# Patient Record
Sex: FEMALE | Race: BLACK OR AFRICAN AMERICAN | Employment: OTHER | URBAN - METROPOLITAN AREA
[De-identification: names, ages, dates, MRNs, and addresses within clinical notes are randomized per-mention and may not be internally consistent; named-entity substitution may affect disease eponyms.]

---

## 2013-01-01 LAB — COLONOSCOPY, EXTERNAL: NORMAL

## 2014-01-01 LAB — PAP SMEAR, EXTERNAL: NORMAL

## 2019-03-29 LAB — MAMMOGRAPHY, EXTERNAL: NORMAL

## 2019-05-01 ENCOUNTER — ED HISTORICAL/CONVERTED ENCOUNTER (OUTPATIENT)
Dept: OTHER | Age: 69
End: 2019-05-01

## 2019-11-08 LAB — HBA1C MFR BLD HPLC: 7.6 %

## 2020-02-10 LAB — HBA1C MFR BLD HPLC: 8 %

## 2020-06-20 VITALS
OXYGEN SATURATION: 96 % | SYSTOLIC BLOOD PRESSURE: 178 MMHG | TEMPERATURE: 98.1 F | BODY MASS INDEX: 39.9 KG/M2 | WEIGHT: 239.5 LBS | HEART RATE: 62 BPM | HEIGHT: 65 IN | DIASTOLIC BLOOD PRESSURE: 87 MMHG | RESPIRATION RATE: 18 BRPM

## 2020-06-20 PROBLEM — I10 ESSENTIAL (PRIMARY) HYPERTENSION: Status: ACTIVE | Noted: 2017-09-26

## 2020-06-20 PROBLEM — M10.9 GOUT: Status: ACTIVE | Noted: 2019-08-09

## 2020-06-20 PROBLEM — J45.20 MILD INTERMITTENT ASTHMA, UNCOMPLICATED: Status: ACTIVE | Noted: 2020-02-10

## 2020-06-20 PROBLEM — E11.9 TYPE 2 DIABETES MELLITUS WITHOUT COMPLICATIONS (HCC): Status: ACTIVE | Noted: 2017-09-26

## 2020-06-20 PROBLEM — E11.69 MIXED HYPERLIPIDEMIA DUE TO TYPE 2 DIABETES MELLITUS (HCC): Status: ACTIVE | Noted: 2017-09-26

## 2020-06-20 PROBLEM — E78.2 MIXED HYPERLIPIDEMIA DUE TO TYPE 2 DIABETES MELLITUS (HCC): Status: ACTIVE | Noted: 2017-09-26

## 2020-06-20 PROBLEM — H40.9 GLAUCOMA: Status: ACTIVE | Noted: 2018-11-30

## 2020-06-20 RX ORDER — COLCHICINE 0.6 MG/1
0.6 TABLET ORAL 2 TIMES DAILY
COMMUNITY
End: 2021-03-05 | Stop reason: ALTCHOICE

## 2020-06-20 RX ORDER — CLONIDINE HYDROCHLORIDE 0.2 MG/1
0.2 TABLET ORAL 2 TIMES DAILY
COMMUNITY
End: 2020-09-02

## 2020-06-20 RX ORDER — GLIPIZIDE 5 MG/1
TABLET ORAL
COMMUNITY
End: 2020-08-10 | Stop reason: SDUPTHER

## 2020-06-20 RX ORDER — BENZONATATE 200 MG/1
200 CAPSULE ORAL
COMMUNITY
End: 2020-08-10 | Stop reason: ALTCHOICE

## 2020-06-20 RX ORDER — ALBUTEROL SULFATE 90 UG/1
2 AEROSOL, METERED RESPIRATORY (INHALATION)
COMMUNITY
End: 2020-08-10 | Stop reason: ALTCHOICE

## 2020-06-20 RX ORDER — OFLOXACIN 3 MG/ML
SOLUTION/ DROPS OPHTHALMIC
COMMUNITY
End: 2020-08-10 | Stop reason: ALTCHOICE

## 2020-06-20 RX ORDER — ALBUTEROL SULFATE 0.83 MG/ML
SOLUTION RESPIRATORY (INHALATION)
COMMUNITY
End: 2020-08-10 | Stop reason: ALTCHOICE

## 2020-06-20 RX ORDER — LATANOPROST 50 UG/ML
1 SOLUTION/ DROPS OPHTHALMIC
COMMUNITY
End: 2020-11-17 | Stop reason: ALTCHOICE

## 2020-06-20 RX ORDER — DORZOLAMIDE HCL 20 MG/ML
1 SOLUTION/ DROPS OPHTHALMIC 2 TIMES DAILY
COMMUNITY
End: 2020-11-17 | Stop reason: ALTCHOICE

## 2020-06-20 RX ORDER — ALLOPURINOL 100 MG/1
100 TABLET ORAL DAILY
COMMUNITY
End: 2020-08-13

## 2020-06-20 RX ORDER — HYDROCHLOROTHIAZIDE 25 MG/1
25 TABLET ORAL DAILY
COMMUNITY
End: 2020-09-02

## 2020-06-20 RX ORDER — METFORMIN HYDROCHLORIDE 1000 MG/1
TABLET ORAL
COMMUNITY
End: 2020-09-02

## 2020-06-20 RX ORDER — ISOPROPYL ALCOHOL 70 ML/100ML
SWAB TOPICAL
COMMUNITY
End: 2020-08-10 | Stop reason: ALTCHOICE

## 2020-06-20 RX ORDER — AMLODIPINE BESYLATE 10 MG/1
10 TABLET ORAL DAILY
COMMUNITY
End: 2020-08-02

## 2020-06-20 RX ORDER — CALCIUM CITRATE/VITAMIN D3 200MG-6.25
TABLET ORAL
COMMUNITY
End: 2021-01-04 | Stop reason: SDUPTHER

## 2020-06-20 RX ORDER — PREDNISOLONE ACETATE 10 MG/ML
1 SUSPENSION/ DROPS OPHTHALMIC
COMMUNITY
End: 2020-08-10 | Stop reason: ALTCHOICE

## 2020-06-20 RX ORDER — ATENOLOL 50 MG/1
50 TABLET ORAL DAILY
COMMUNITY
End: 2020-08-10 | Stop reason: SDUPTHER

## 2020-06-20 RX ORDER — PRAVASTATIN SODIUM 40 MG/1
40 TABLET ORAL DAILY
COMMUNITY
End: 2020-09-02

## 2020-06-20 RX ORDER — BLOOD-GLUCOSE METER
EACH MISCELLANEOUS
COMMUNITY
End: 2020-11-17 | Stop reason: ALTCHOICE

## 2020-06-20 RX ORDER — BRIMONIDINE TARTRATE 2 MG/ML
SOLUTION/ DROPS OPHTHALMIC
COMMUNITY
End: 2020-11-17 | Stop reason: ALTCHOICE

## 2020-06-20 RX ORDER — LISINOPRIL 20 MG/1
20 TABLET ORAL DAILY
COMMUNITY
End: 2020-09-02

## 2020-06-20 RX ORDER — GLUCOSAM/CHON-MSM1/C/MANG/BOSW 500-416.6
TABLET ORAL
COMMUNITY
End: 2022-05-13 | Stop reason: ALTCHOICE

## 2020-06-20 RX ORDER — TIMOLOL MALEATE 5 MG/ML
SOLUTION/ DROPS OPHTHALMIC
COMMUNITY
End: 2020-11-17 | Stop reason: ALTCHOICE

## 2020-08-02 RX ORDER — AMLODIPINE BESYLATE 10 MG/1
TABLET ORAL
Qty: 90 TAB | Refills: 0 | Status: SHIPPED | OUTPATIENT
Start: 2020-08-02 | End: 2020-11-20

## 2020-08-10 ENCOUNTER — OFFICE VISIT (OUTPATIENT)
Dept: PRIMARY CARE CLINIC | Age: 70
End: 2020-08-10
Payer: MEDICARE

## 2020-08-10 VITALS
RESPIRATION RATE: 16 BRPM | HEIGHT: 65 IN | TEMPERATURE: 96.3 F | WEIGHT: 236.5 LBS | BODY MASS INDEX: 39.4 KG/M2 | SYSTOLIC BLOOD PRESSURE: 133 MMHG | DIASTOLIC BLOOD PRESSURE: 65 MMHG | OXYGEN SATURATION: 96 % | HEART RATE: 70 BPM

## 2020-08-10 DIAGNOSIS — E11.9 TYPE 2 DIABETES MELLITUS WITHOUT COMPLICATION, WITHOUT LONG-TERM CURRENT USE OF INSULIN (HCC): ICD-10-CM

## 2020-08-10 DIAGNOSIS — I10 ESSENTIAL (PRIMARY) HYPERTENSION: ICD-10-CM

## 2020-08-10 DIAGNOSIS — E66.01 SEVERE OBESITY (HCC): ICD-10-CM

## 2020-08-10 DIAGNOSIS — E11.9 CONTROLLED TYPE 2 DIABETES MELLITUS WITHOUT COMPLICATION, WITHOUT LONG-TERM CURRENT USE OF INSULIN (HCC): ICD-10-CM

## 2020-08-10 DIAGNOSIS — E11.69 MIXED HYPERLIPIDEMIA DUE TO TYPE 2 DIABETES MELLITUS (HCC): ICD-10-CM

## 2020-08-10 DIAGNOSIS — E78.2 MIXED HYPERLIPIDEMIA DUE TO TYPE 2 DIABETES MELLITUS (HCC): ICD-10-CM

## 2020-08-10 DIAGNOSIS — M15.9 OSTEOARTHRITIS INVOLVING MULTIPLE JOINTS ON BOTH SIDES OF BODY: Primary | ICD-10-CM

## 2020-08-10 DIAGNOSIS — B35.4 TINEA CORPORIS: ICD-10-CM

## 2020-08-10 DIAGNOSIS — M1A.9XX0 CHRONIC GOUT WITHOUT TOPHUS, UNSPECIFIED CAUSE, UNSPECIFIED SITE: ICD-10-CM

## 2020-08-10 DIAGNOSIS — Z12.39 SCREENING FOR BREAST CANCER: ICD-10-CM

## 2020-08-10 PROBLEM — M17.12 PRIMARY OSTEOARTHRITIS OF LEFT KNEE: Status: ACTIVE | Noted: 2017-08-08

## 2020-08-10 PROBLEM — Z96.659 HISTORY OF TOTAL KNEE REPLACEMENT: Status: ACTIVE | Noted: 2017-06-30

## 2020-08-10 PROCEDURE — G8417 CALC BMI ABV UP PARAM F/U: HCPCS | Performed by: FAMILY MEDICINE

## 2020-08-10 PROCEDURE — 3017F COLORECTAL CA SCREEN DOC REV: CPT | Performed by: FAMILY MEDICINE

## 2020-08-10 PROCEDURE — G8427 DOCREV CUR MEDS BY ELIG CLIN: HCPCS | Performed by: FAMILY MEDICINE

## 2020-08-10 PROCEDURE — G9899 SCRN MAM PERF RSLTS DOC: HCPCS | Performed by: FAMILY MEDICINE

## 2020-08-10 PROCEDURE — G8752 SYS BP LESS 140: HCPCS | Performed by: FAMILY MEDICINE

## 2020-08-10 PROCEDURE — G8754 DIAS BP LESS 90: HCPCS | Performed by: FAMILY MEDICINE

## 2020-08-10 PROCEDURE — G8536 NO DOC ELDER MAL SCRN: HCPCS | Performed by: FAMILY MEDICINE

## 2020-08-10 PROCEDURE — G8510 SCR DEP NEG, NO PLAN REQD: HCPCS | Performed by: FAMILY MEDICINE

## 2020-08-10 PROCEDURE — 99214 OFFICE O/P EST MOD 30 MIN: CPT | Performed by: FAMILY MEDICINE

## 2020-08-10 PROCEDURE — 3052F HG A1C>EQUAL 8.0%<EQUAL 9.0%: CPT | Performed by: FAMILY MEDICINE

## 2020-08-10 PROCEDURE — G8400 PT W/DXA NO RESULTS DOC: HCPCS | Performed by: FAMILY MEDICINE

## 2020-08-10 RX ORDER — ATENOLOL 50 MG/1
50 TABLET ORAL DAILY
Qty: 90 TAB | Refills: 1 | Status: SHIPPED | OUTPATIENT
Start: 2020-08-10 | End: 2021-02-13

## 2020-08-10 RX ORDER — GLIPIZIDE 5 MG/1
10 TABLET ORAL 2 TIMES DAILY
Qty: 360 TAB | Refills: 1 | Status: SHIPPED | OUTPATIENT
Start: 2020-08-10 | End: 2021-02-06

## 2020-08-10 RX ORDER — PRENATAL VIT 91/IRON/FOLIC/DHA 28-975-200
COMBINATION PACKAGE (EA) ORAL 2 TIMES DAILY
Qty: 30 G | Refills: 0 | Status: SHIPPED | OUTPATIENT
Start: 2020-08-10 | End: 2020-11-17 | Stop reason: ALTCHOICE

## 2020-08-10 RX ORDER — DICLOFENAC SODIUM 10 MG/G
2 GEL TOPICAL 4 TIMES DAILY
Qty: 100 G | Refills: 1 | Status: SHIPPED | OUTPATIENT
Start: 2020-08-10 | End: 2021-03-05 | Stop reason: ALTCHOICE

## 2020-08-10 RX ORDER — TRIAMCINOLONE ACETONIDE 5 MG/G
CREAM TOPICAL
Qty: 30 G | Refills: 1 | Status: SHIPPED | OUTPATIENT
Start: 2020-08-10 | End: 2020-11-17 | Stop reason: ALTCHOICE

## 2020-08-10 NOTE — PROGRESS NOTES
Blaze Thorne is a 65 y.o. female who presents today for the following:  Chief Complaint   Patient presents with    Hypertension    Follow Up Chronic Condition    Diabetes    Labs        This patient comes in today for follow up of the following medical conditions: Diabetes Mellitus, Hypertension and Hyperlipidemia She is doing well. Allergies   Allergen Reactions    Aspirin Shortness of Breath     Respiratory distress    Codeine Vertigo       Current Outpatient Medications   Medication Sig    glipiZIDE (GLUCOTROL) 5 mg tablet Take 2 Tabs by mouth two (2) times a day for 180 days. Take 2 TABS by mouth twice daily.  atenoloL (TENORMIN) 50 mg tablet Take 1 Tab by mouth daily. Indications: high blood pressure    terbinafine HCL (LAMISIL) 1 % topical cream Apply  to affected area two (2) times a day.  triamcinolone (ARISTOCORT) 0.5 % topical cream Apply thin amount to skin as needed for itching    diclofenac (VOLTAREN) 1 % gel Apply 2 g to affected area four (4) times daily. As needed for arthritis pain    amLODIPine (NORVASC) 10 mg tablet TAKE 1 TABLET EVERY DAY    allopurinoL (ZYLOPRIM) 100 mg tablet Take 100 mg by mouth daily.  brimonidine (ALPHAGAN) 0.2 % ophthalmic solution Unknown directions    cloNIDine HCL (CATAPRES) 0.2 mg tablet Take 0.2 mg by mouth two (2) times a day.  dorzolamide (TRUSOPT) 2 % ophthalmic solution Administer 1 Drop to both eyes two (2) times a day.  hydroCHLOROthiazide (HYDRODIURIL) 25 mg tablet Take 25 mg by mouth daily.  latanoprost (XALATAN) 0.005 % ophthalmic solution Administer 1 Drop to both eyes nightly.  lisinopriL (PRINIVIL, ZESTRIL) 20 mg tablet Take 20 mg by mouth daily.  metFORMIN (GLUCOPHAGE) 1,000 mg tablet Take 1/2 tablet by mouth twice daily.  pravastatin (PRAVACHOL) 40 mg tablet Take 40 mg by mouth daily.     timolol (TIMOPTIC) 0.5 % ophthalmic solution Unknown directions    True Metrix Air Glucose Meter monitoring kit USE TO CHECK BLOOD SUGAR DAILY    True Metrix Glucose Test Strip strip USE TO CHECK BLOOD SUGAR DAILY    TRUEplus Lancets 28 gauge misc USE TO CHECK BLOOD SUGAR DAILY    colchicine (Colcrys) 0.6 mg tablet Take 0.6 mg by mouth two (2) times a day. No current facility-administered medications for this visit. Past Medical History:   Diagnosis Date    Essential (primary) hypertension 9/26/2017    Glaucoma 11/30/2018    Gout 8/9/2019    Mild intermittent asthma, uncomplicated 8/85/1570    Mixed hyperlipidemia due to type 2 diabetes mellitus ([facility] [address] 9/26/2017    Type 2 diabetes mellitus without complications ([facility] [address] 4/52/2507       Past Surgical History:   Procedure Laterality Date    HX HERNIA REPAIR  Unknown date    HX KNEE ARTHROSCOPY Right 01/01/2013    HX OTHER SURGICAL  05/16/2017    Eye surgery procedure    HX PARTIAL HYSTERECTOMY  Unknown date       Family History   Problem Relation Age of Onset    Arthritis-osteo Mother        Social History     Socioeconomic History    Marital status:      Spouse name: Not on file    Number of children: Not on file    Years of education: Not on file    Highest education level: Not on file   Occupational History    Not on file   Social Needs    Financial resource strain: Not on file    Food insecurity     Worry: Not on file     Inability: Not on file    Transportation needs     Medical: Not on file     Non-medical: Not on file   Tobacco Use    Smoking status: Former Smoker     Types: Cigarettes    Smokeless tobacco: Never Used    Tobacco comment: At least 30 years ago.    Substance and Sexual Activity    Alcohol use: Never     Frequency: Never    Drug use: Never    Sexual activity: Not on file   Lifestyle    Physical activity     Days per week: Not on file     Minutes per session: Not on file    Stress: Not on file   Relationships    Social connections     Talks on phone: Not on file     Gets together: Not on file     Attends [employer] service: Not on file     Active member of club or organization: Not on file     Attends meetings of clubs or organizations: Not on file     Relationship status: Not on file    Intimate partner violence     Fear of current or ex partner: Not on file     Emotionally abused: Not on file     Physically abused: Not on file     Forced sexual activity: Not on file   Other Topics Concern     Service Not Asked    Blood Transfusions Not Asked    Caffeine Concern Not Asked    Occupational Exposure Not Asked   [place] Hazards Not Asked    Sleep Concern Not Asked    Stress Concern Not Asked    Weight Concern Not Asked    Special Diet Not Asked    Back Care Not Asked    Exercise Not Asked    Bike Helmet Not Asked   [address] Not Asked    Self-Exams Not Asked   Social History Narrative    Not on file         Review of Systems   Constitutional: Negative. Respiratory: Negative. Cardiovascular: Negative. Gastrointestinal: Negative. Genitourinary: Negative. Musculoskeletal: Negative. Skin: Positive for itching (Circular lesion on the top of the right foot that itches for 2 weeks. ). Neurological: Negative. Endo/Heme/Allergies:        She does check her sugars daily and run around 150   Psychiatric/Behavioral: Negative. All other systems reviewed and are negative. Visit Vitals  /65 (BP 1 Location: Left arm, BP Patient Position: Sitting)   Pulse 70   Temp (!) 96.3 °F (35.7 °C) (Oral)   Resp 16   Ht 5' 5\" (1.651 m)   Wt 236 lb 8 oz (107.3 kg)   SpO2 96%   BMI 39.36 kg/m²     Physical Exam  Vitals signs and nursing note reviewed. Constitutional:       Appearance: Normal appearance. She is obese. Neck:      Musculoskeletal: Normal range of motion and neck supple. Cardiovascular:      Rate and Rhythm: Normal rate and regular rhythm. Pulses: Normal pulses. Heart sounds: Normal heart sounds.    Pulmonary:      Effort: Pulmonary effort is normal.      Breath sounds: Normal breath sounds. Abdominal:      General: Abdomen is flat. Bowel sounds are normal.      Palpations: Abdomen is soft. Musculoskeletal: Normal range of motion. Skin:     General: Skin is warm and dry. Findings: Lesion (2 cm circular papular lesion with raised borders. ) present. Neurological:      General: No focal deficit present. Mental Status: She is alert. Psychiatric:         Mood and Affect: Mood normal.         Behavior: Behavior normal.         Thought Content: Thought content normal.         Judgment: Judgment normal.              1. Controlled type 2 diabetes mellitus without complication, without long-term current use of insulin ([address]  She is doing well and we will check labs to see how things are.  - glipiZIDE (GLUCOTROL) 5 mg tablet; Take 2 Tabs by mouth two (2) times a day for 180 days. Take 2 TABS by mouth twice daily. Dispense: 360 Tab; Refill: 1  - CBC WITH AUTOMATED DIFF  - METABOLIC PANEL, COMPREHENSIVE  - LIPID PANEL  - URINALYSIS W/ RFLX MICROSCOPIC  - HEMOGLOBIN A1C WITH EAG  - MICROALBUMIN, UR, RAND W/ MICROALB/CREAT RATIO    2. Severe obesity ([address]  Discussed the need to work on weight loss and cut back on portion sizes    3. Essential (primary) hypertension  Blood pressure stable today  - atenoloL (TENORMIN) 50 mg tablet; Take 1 Tab by mouth daily. Indications: high blood pressure  Dispense: 90 Tab; Refill: 1  - CBC WITH AUTOMATED DIFF  - METABOLIC PANEL, COMPREHENSIVE  - URINALYSIS W/ RFLX MICROSCOPIC    4. Mixed hyperlipidemia due to type 2 diabetes mellitus (HCC)  - LIPID PANEL    5. Type 2 diabetes mellitus without complication, without long-term current use of insulin (HCC)  We will check labs to see how she is doing    6. Chronic gout without tophus, unspecified cause, unspecified site    - URIC ACID    7. Screening for breast cancer    - [facility] MAMMO BI SCREENING INCL CAD;  Future

## 2020-08-10 NOTE — PATIENT INSTRUCTIONS
Noninsulin Medicines for Type 2 Diabetes: Care Instructions Overview There are different types of noninsulin medicines for diabetes. Each works in a different way. But they all help you control your blood sugar. Some types help your body make insulin to lower your blood sugar. Others lower how much insulin your body needs. Some can slow how fast your body digests sugars. And some can remove extra glucose through your urine. You may need to take more than one medicine for diabetes. Two or more medicines may work better to lower your blood sugar level than just one does. · Metformin. This lowers how much glucose your liver makes. And it helps you respond better to insulin. It also lowers the amount of stored sugar that your liver releases when you are not eating. · Sulfonylureas. These help your body release more insulin. Some work for many hours. They can cause low blood sugar if you don't eat as you planned. An example is glipizide. · Thiazolidinediones. These reduce the amount of blood glucose. They also help you respond better to insulin. An example is pioglitazone. · SGLT2 inhibitors. These help to remove extra glucose through your urine. They may also help some people lose weight. An example is ertugliflozin. · DPP-4 inhibitors. These help your body raise the level of insulin after you eat. They also help your body make less of a hormone that raises blood sugar. An example is alogliptin. · Incretin hormones (GLP-1 receptor agonists). These help your body make a protein that can raise your insulin level and make you less hungry. They're given as shots or pills. An example is semaglutide. · Meglitinides. These help your body release insulin. They also help slow how your body digests sugars. So they can keep your blood sugar from rising too fast after you eat. · Alpha-glucosidase inhibitors. These keep starches from breaking down. This means that they lower the amount of glucose absorbed when you eat. They don't help your body make more insulin. So they will not cause low blood sugar unless you use them with other medicines for diabetes. Follow-up care is a key part of your treatment and safety. Be sure to make and go to all appointments, and call your doctor if you are having problems. It's also a good idea to know your test results and keep a list of the medicines you take. How can you care for yourself at home? · Eat a healthy diet. Get some exercise each day. This may help you to reduce how much medicine you need. · Do not take other prescription or over-the-counter medicines, vitamins, herbal products, or supplements without talking to your doctor first. Some medicines for type 2 diabetes can cause problems with other medicines or supplements. · Tell your doctor if you plan to get pregnant. Some of these drugs are not safe for pregnant women. · Be safe with medicines. Take your medicines exactly as prescribed. Meglitinides and sulfonylureas can cause your blood sugar to drop very low. Call your doctor if you think you are having a problem with your medicine. · Check your blood sugar often. You can use a glucose monitor. Keeping track can help you know how certain foods, activities, and medicines affect your blood sugar. And it can help you keep your blood sugar from getting so low that it's not safe. When should you call for help? [id number] anytime you think you may need emergency care. For example, call if: 
· You passed out (lost consciousness). · You are confused or cannot think clearly. · Your blood sugar is very high or very low. Watch closely for changes in your health, and be sure to contact your doctor if: 
· Your blood sugar stays outside the level your doctor set for you. · You have any problems. Where can you learn more? Go to http://golden-sophie.info/ Enter H153 in the search box to learn more about \"Noninsulin Medicines for Type 2 Diabetes: Care Instructions. \" Current as of: December 20, 2019               Content Version: 12.5 © 1166-2751 [employer]. Care instructions adapted under license by [employer] (which disclaims liability or warranty for this information). If you have questions about a medical condition or this instruction, always ask your healthcare professional. [place] [address] any warranty or liability for your use of this information. Diabetes Foot Health: Care Instructions Your Care Instructions When you have diabetes, your feet need extra care and attention. Diabetes can damage the nerve endings and blood vessels in your feet, making you less likely to notice when your feet are injured. Diabetes also limits your body's ability to fight infection and get blood to areas that need it. If you get a minor foot injury, it could become an ulcer or a serious infection. With good foot care, you can prevent most of these problems. Caring for your feet can be quick and easy. Most of the care can be done when you are bathing or getting ready for bed. Follow-up care is a key part of your treatment and safety. Be sure to make and go to all appointments, and call your doctor if you are having problems. It's also a good idea to know your test results and keep a list of the medicines you take. How can you care for yourself at home? · Keep your blood sugar close to normal by watching what and how much you eat, monitoring blood sugar, taking medicines if prescribed, and getting regular exercise. · Do not smoke. Smoking affects blood flow and can make foot problems worse. If you need help quitting, talk to your doctor about stop-smoking programs and medicines. These can increase your chances of quitting for good. · Eat a diet that is low in fats.  High fat intake can cause fat to build up in your blood vessels and decrease blood flow. · Inspect your feet daily for blisters, cuts, cracks, or sores. If you cannot see well, use a mirror or have someone help you. · Take care of your feet: 
? Wash your feet every day. Use warm (not hot) water. Check the water temperature with your wrists or other part of your body, not your feet. ? Dry your feet well. Pat them dry. Do not rub the skin on your feet too hard. Dry well between your toes. If the skin on your feet stays moist, bacteria or a fungus can grow, which can lead to infection. ? Keep your skin soft. Use moisturizing skin cream to keep the skin on your feet soft and prevent calluses and cracks. But do not put the cream between your toes, and stop using any cream that causes a rash. ? Clean underneath your toenails carefully. Do not use a sharp object to clean underneath your toenails. Use the blunt end of a nail file or other rounded tool. ? Trim and file your toenails straight across to prevent ingrown toenails. Use a nail clipper, not scissors. Use an emery board to smooth the edges. · Change socks daily. Socks without seams are best, because seams often rub the feet. You can find socks for people with diabetes from specialty catalogs. · Look inside your shoes every day for things like gravel or torn linings, which could cause blisters or sores. · Buy shoes that fit well: 
? Look for shoes that have plenty of space around the toes. This helps prevent bunions and blisters. ? Try on shoes while wearing the kind of socks you will usually wear with the shoes. ? Avoid plastic shoes. They may rub your feet and cause blisters. Good shoes should be made of materials that are flexible and breathable, such as leather or cloth. ? Break in new shoes slowly by wearing them for no more than an hour a day for several days. Take extra time to check your feet for red areas, blisters, or other problems after you wear new shoes. · Do not go barefoot. Do not wear sandals, and do not wear shoes with very thin soles. Thin soles are easy to puncture. They also do not protect your feet from hot pavement or cold weather. · Have your doctor check your feet during each visit. If you have a foot problem, see your doctor. Do not try to treat an early foot problem at home. Home remedies or treatments that you can buy without a prescription (such as corn removers) can be harmful. · Always get early treatment for foot problems. A minor irritation can lead to a major problem if not properly cared for early. When should you call for help? Call your doctor now or seek immediate medical care if: 
· You have a foot sore, an ulcer or break in the skin that is not healing after 4 days, bleeding corns or calluses, or an ingrown toenail. · You have blue or black areas, which can mean bruising or blood flow problems. · You have peeling skin or tiny blisters between your toes or cracking or oozing of the skin. · You have a fever for more than 24 hours and a foot sore. · You have new numbness or tingling in your feet that does not go away after you move your feet or change positions. · You have unexplained or unusual swelling of the foot or ankle. Watch closely for changes in your health, and be sure to contact your doctor if: 
· You cannot do proper foot care. Where can you learn more? Go to http://www.gray.com/ Enter A739 in the search box to learn more about \"Diabetes Foot Health: Care Instructions. \" Current as of: December 20, 2019               Content Version: 12.5 © 7937-9773 [employer]. Care instructions adapted under license by [employer] (which disclaims liability or warranty for this information).  If you have questions about a medical condition or this instruction, always ask your healthcare professional. Roxi Chap[employer] disclaims any warranty or liability for your use of this information. Learning About Meal Planning for Diabetes Why plan your meals? Meal planning can be a key part of managing diabetes. Planning meals and snacks with the right balance of carbohydrate, protein, and fat can help you keep your blood sugar at the target level you set with your doctor. You don't have to eat special foods. You can eat what your family eats, including sweets once in a while. But you do have to pay attention to how often you eat and how much you eat of certain foods. You may want to work with a dietitian or a certified diabetes educator. He or she can give you tips and meal ideas and can answer your questions about meal planning. This health professional can also help you reach a healthy weight if that is one of your goals. What plan is right for you? Your dietitian or diabetes educator may suggest that you start with the plate format or carbohydrate counting. The plate format The plate format is a simple way to help you manage how you eat. You plan meals by learning how much space each food should take on a plate. Using the plate format helps you spread carbohydrate throughout the day. It can make it easier to keep your blood sugar level within your target range. It also helps you see if you're eating healthy portion sizes. To use the plate format, you put non-starchy vegetables on half your plate. Add meat or meat substitutes on one-quarter of the plate. Put a grain or starchy vegetable (such as brown rice or a potato) on the final quarter of the plate. You can add a small piece of fruit and some low-fat or fat-free milk or yogurt, depending on your carbohydrate goal for each meal. 
Here are some tips for using the plate format: · Make sure that you are not using an oversized plate. A 9-inch plate is best. Many restaurants use larger plates. · Get used to using the plate format at home. Then you can use it when you eat out. · Write down your questions about using the plate format. Talk to your doctor, a dietitian, or a diabetes educator about your concerns. Carbohydrate counting With carbohydrate counting, you plan meals based on the amount of carbohydrate in each food. Carbohydrate raises blood sugar higher and more quickly than any other nutrient. It is found in desserts, breads and cereals, and fruit. It's also found in starchy vegetables such as potatoes and corn, grains such as rice and pasta, and milk and yogurt. Spreading carbohydrate throughout the day helps keep your blood sugar levels within your target range. Your daily amount depends on several things, including your weight, how active you are, which diabetes medicines you take, and what your goals are for your blood sugar levels. A registered dietitian or diabetes educator can help you plan how much carbohydrate to include in each meal and snack. A guideline for your daily amount of carbohydrate is: · 45 to 60 grams at each meal. That's about the same as 3 to 4 carbohydrate servings. · 15 to 20 grams at each snack. That's about the same as 1 carbohydrate serving. The Nutrition Facts label on packaged foods tells you how much carbohydrate is in a serving of the food. First, look at the serving size on the food label. Is that the amount you eat in a serving? All of the nutrition information on a food label is based on that serving size. So if you eat more or less than that, you'll need to adjust the other numbers. Total carbohydrate is the next thing you need to look for on the label. If you count carbohydrate servings, one serving of carbohydrate is 15 grams. For foods that don't come with labels, such as fresh fruits and vegetables, you'll need a guide that lists carbohydrate in these foods. Ask your doctor, dietitian, or diabetes educator about books or other nutrition guides you can use.  
If you take insulin, you need to know how many grams of carbohydrate are in a meal. This lets you know how much rapid-acting insulin to take before you eat. If you use an insulin pump, you get a constant rate of insulin during the day. So the pump must be programmed at meals to give you extra insulin to cover the rise in blood sugar after meals. When you know how much carbohydrate you will eat, you can take the right amount of insulin. Or, if you always use the same amount of insulin, you need to make sure that you eat the same amount of carbohydrate at meals. If you need more help to understand carbohydrate counting and food labels, ask your doctor, dietitian, or diabetes educator. How do you get started with meal planning? Here are some tips to get started: 
· Plan your meals a week at a time. Don't forget to include snacks too. · Use cookbooks or online recipes to plan several main meals. Plan some quick meals for busy nights. You also can double some recipes that freeze well. Then you can save half for other busy nights when you don't have time to cook. · Make sure you have the ingredients you need for your recipes. If you're running low on basic items, put these items on your shopping list too. · List foods that you use to make breakfasts, lunches, and snacks. List plenty of fruits and vegetables. · Post this list on the refrigerator. Add to it as you think of more things you need. · Take the list to the store to do your weekly shopping. Follow-up care is a key part of your treatment and safety. Be sure to make and go to all appointments, and call your doctor if you are having problems. It's also a good idea to know your test results and keep a list of the medicines you take. Where can you learn more? Go to http://golden-sophie.info/ Jose Olguin in the search box to learn more about \"Learning About Meal Planning for Diabetes. \" Current as of: December 20, 2019               Content Version: 12.5 © 6077-0517 [employer]. Care instructions adapted under license by [employer] (which disclaims liability or warranty for this information). If you have questions about a medical condition or this instruction, always ask your healthcare professional. [address] any warranty or liability for your use of this information. Nutrition Tips for Diabetes: After Your Visit Your Care Instructions A healthy diet is important to manage diabetes. It helps you lose weight (if you need to) and keep it off. It gives you the nutrition and energy your body needs and helps prevent heart disease. But a diet for diabetes does not mean that you have to eat special foods. You can eat what your family eats, including occasional sweets and other favorites. But you do have to pay attention to how often you eat and how much you eat of certain foods. The right plan for you will give you meals that help you keep your blood sugar at healthy levels. Try to eat a variety of foods and to spread carbohydrate throughout the day. Carbohydrate raises blood sugar higher and more quickly than any other nutrient does. Carbohydrate is found in sugar, breads and cereals, fruit, starchy vegetables such as potatoes and corn, and milk and yogurt. You may want to work with a dietitian or diabetes educator to help you plan meals and snacks. A dietitian or diabetes educator also can help you lose weight if that is one of your goals. The following tips can help you enjoy your meals and stay healthy. Follow-up care is a key part of your treatment and safety. Be sure to make and go to all appointments, and call your doctor if you are having problems. Its also a good idea to know your test results and keep a list of the medicines you take. How can you care for yourself at home? · Learn which foods have carbohydrate and how much carbohydrate to eat.  A dietitian or diabetes educator can help you learn to keep track of how much carbohydrate you eat. · Spread carbohydrate throughout the day. Eat some carbohydrate at all meals, but do not eat too much at any one time. · Plan meals to include food from all the food groups. These are the food groups and some example portion sizes: ¨ Grains: 1 slice of bread (1 ounce), ½ cup of cooked cereal, and 1/3 cup of cooked pasta or rice. These have about 15 grams of carbohydrate in a serving. Choose whole grains such as whole wheat bread or crackers, oatmeal, and brown rice more often than refined grains. ¨ Fruit: 1 small fresh fruit, such as an apple or orange; ½ of a banana; ½ cup of chopped, cooked, or canned fruit; ½ cup of fruit juice; 1 cup of melon or raspberries; and 2 tablespoons of dried fruit. These have about 15 grams of carbohydrate in a serving. ¨ Dairy: 1 cup of nonfat or low-fat milk and 2/3 cup of plain yogurt. These have about 15 grams of carbohydrate in a serving. ¨ Protein foods: Beef, chicken, turkey, fish, eggs, tofu, cheese, cottage cheese, and peanut butter. A serving size of meat is 3 ounces, which is about the size of a deck of cards. Examples of meat substitute serving sizes (equal to 1 ounce of meat) are 1/4 cup of cottage cheese, 1 egg, 1 tablespoon of peanut butter, and ½ cup of tofu. These have very little or no carbohydrate per serving. ¨ Vegetables: Starchy vegetables such as ½ cup of cooked dried beans, peas, potatoes, or corn have about 15 grams of carbohydrate. Nonstarchy vegetables have very little carbohydrate, such as 1 cup of raw leafy vegetables (such as spinach), ½ cup of other vegetables (cooked or chopped), and 3/4 cup of vegetable juice. · Use the plate format to plan meals. It is a good, quick way to make sure that you have a balanced meal. It also helps you spread carbohydrate throughout the day. You divide your plate by types of foods.  Put vegetables on half the plate, meat or meat substitutes on one-quarter of the plate, and a grain or starchy vegetable (such as brown rice or a potato) in the final quarter of the plate. To this you can add a small piece of fruit and 1 cup of milk or yogurt, depending on how much carbohydrate you are supposed to eat at a meal. 
· Talk to your dietitian or diabetes educator about ways to add limited amounts of sweets into your meal plan. You can eat these foods now and then, as long as you include the amount of carbohydrate they have in your daily carbohydrate allowance. · If you drink alcohol, limit it to no more than 1 drink a day for women and 2 drinks a day for men. If you are pregnant, no amount of alcohol is known to be safe. · Protein, fat, and fiber do not raise blood sugar as much as carbohydrate does. If you eat a lot of these nutrients in a meal, your blood sugar will rise more slowly than it would otherwise. · Limit saturated fats, such as those from meat and dairy products. Try to replace it with monounsaturated fat, such as olive oil. This is a healthier choice because people who have diabetes are at higher-than-average risk of heart disease. But use a modest amount of olive oil. A tablespoon of olive oil has 14 grams of fat and 120 calories. · Exercise lowers blood sugar. If you take insulin by shots or pump, you can use less than you would if you were not exercising. Keep in mind that timing matters. If you exercise within 1 hour after a meal, your body may need less insulin for that meal than it would if you exercised 3 hours after the meal. Test your blood sugar to find out how exercise affects your need for insulin. · Exercise on most days of the week. Aim for at least 30 minutes. Exercise helps you stay at a healthy weight and helps your body use insulin. Walking is an easy way to get exercise. Gradually increase the amount you walk every day. You also may want to swim, bike, or do other activities. When you eat out · Learn to estimate the serving sizes of foods that have carbohydrate. If you measure food at home, it will be easier to estimate the amount in a serving of restaurant food. · If the meal you order has too much carbohydrate (such as potatoes, corn, or baked beans), ask to have a low-carbohydrate food instead. Ask for a salad or green vegetables. · If you use insulin, check your blood sugar before and after eating out to help you plan how much to eat in the future. · If you eat more carbohydrate at a meal than you had planned, take a walk or do other exercise. This will help lower your blood sugar. Where can you learn more? Go to OhLife.be Enter Z410 in the search box to learn more about \"Nutrition Tips for Diabetes: After Your Visit. \"  
© 9302-2255 [employer]wise, [employer]orporated. Care instructions adapted under license by [facility] (which disclaims liability or warranty for this information). This care instruction is for use with your licensed healthcare professional. If you have questions about a medical condition or this instruction, always ask your healthcare professional. [place] [address] any warranty or liability for your use of this information. Content Version: 55.4.343004; Current as of: June 4, 2014 Ringworm: Care Instructions Your Care Instructions Ringworm is a fungus infection of the skin. It is not caused by a worm. Ringworm causes a round, scaly rash that may crack and itch. The rash can spread over a wide area. One type of fungus that causes ringworm is often found in locker rooms and swimming pools. It grows well in warm, moist areas of the skin, such as in skin folds. You can get ringworm by sharing towels, clothing, and sports equipment. You can also get it by touching someone who has ringworm. Ringworm is treated with cream that kills the fungus.  If the rash is widespread, you may need pills to get rid of it. Ringworm often comes back after treatment. If the rash becomes infected with bacteria, you may need antibiotics. Follow-up care is a key part of your treatment and safety. Be sure to make and go to all appointments, and call your doctor if you are having problems. It's also a good idea to know your test results and keep a list of the medicines you take. How can you care for yourself at home? · Take your medicines exactly as prescribed. Call your doctor if you have any problems with your medicine. · Wash the rash with soap and water, remove flaky skin, and dry thoroughly. · Try an over-the-counter cream with clotrimazole or miconazole in it. Brand names include Lotrimin, Micatin, and Tinactin. Terbinafine cream (Lamisil) is also available without a prescription. Spread the cream beyond the edge or border of the rash. Follow the directions on the package. Do not stop using the medicine just because your skin clears up. You will probably need to continue treatment for 2 to 4 weeks. · To keep from getting another infection: ? Do not go barefoot in public places such as gyms or locker rooms. Avoid sharing towels and clothes. Use flip-flops or some other type of shoe in the shower. ? Do not wear tight clothes or let your skin stay damp for long periods, such as by staying in a wet bathing suit or sweaty clothes. When should you call for help? Call your doctor now or seek immediate medical care if: 
· You have signs of infection such as: 
? Pain, warmth, or swelling in your skin. ? Red streaks near a wound in the skin. ? Pus coming from the rash on your skin. ? A fever. Watch closely for changes in your health, and be sure to contact your doctor if: 
· Your ringworm does not improve after 2 weeks of treatment. · You do not get better as expected. Where can you learn more? Go to http://[email or web address].[email or web address]/ Enter Q991 in the search box to learn more about \"Ringworm: Care Instructions. \" Current as of: October 31, 2019               Content Version: 12.5 © 5911-0117 [employer]. Care instructions adapted under license by [employer] (which disclaims liability or warranty for this information). If you have questions about a medical condition or this instruction, always ask your healthcare professional. [place] [address] any warranty or liability for your use of this information.

## 2020-08-11 ENCOUNTER — TELEPHONE (OUTPATIENT)
Dept: PRIMARY CARE CLINIC | Age: 70
End: 2020-08-11

## 2020-08-11 LAB
ALBUMIN SERPL-MCNC: 4.6 G/DL (ref 3.7–4.7)
ALBUMIN/CREAT UR: 97 MG/G CREAT (ref 0–29)
ALBUMIN/GLOB SERPL: 1.4 {RATIO} (ref 1.2–2.2)
ALP SERPL-CCNC: 71 IU/L (ref 39–117)
ALT SERPL-CCNC: 15 IU/L (ref 0–32)
APPEARANCE UR: CLEAR
AST SERPL-CCNC: 16 IU/L (ref 0–40)
BACTERIA #/AREA URNS HPF: ABNORMAL /[HPF]
BASOPHILS # BLD AUTO: 0 X10E3/UL (ref 0–0.2)
BASOPHILS NFR BLD AUTO: 1 %
BILIRUB SERPL-MCNC: 0.3 MG/DL (ref 0–1.2)
BILIRUB UR QL STRIP: NEGATIVE
BUN SERPL-MCNC: 9 MG/DL (ref 8–27)
BUN/CREAT SERPL: 12 (ref 12–28)
CALCIUM SERPL-MCNC: 9.9 MG/DL (ref 8.7–10.3)
CASTS URNS MICRO: ABNORMAL
CASTS URNS QL MICRO: PRESENT /LPF
CHLORIDE SERPL-SCNC: 96 MMOL/L (ref 96–106)
CHOLEST SERPL-MCNC: 179 MG/DL (ref 100–199)
CO2 SERPL-SCNC: 27 MMOL/L (ref 20–29)
COLOR UR: ABNORMAL
CREAT SERPL-MCNC: 0.75 MG/DL (ref 0.57–1)
CREAT UR-MCNC: 78.8 MG/DL
EOSINOPHIL # BLD AUTO: 0.1 X10E3/UL (ref 0–0.4)
EOSINOPHIL NFR BLD AUTO: 2 %
EPI CELLS #/AREA URNS HPF: ABNORMAL /HPF (ref 0–10)
ERYTHROCYTE [DISTWIDTH] IN BLOOD BY AUTOMATED COUNT: 12.8 % (ref 11.7–15.4)
EST. AVERAGE GLUCOSE BLD GHB EST-MCNC: 237 MG/DL
GLOBULIN SER CALC-MCNC: 3.2 G/DL (ref 1.5–4.5)
GLUCOSE SERPL-MCNC: 172 MG/DL (ref 65–99)
GLUCOSE UR QL: NEGATIVE
HBA1C MFR BLD: 9.9 % (ref 4.8–5.6)
HCT VFR BLD AUTO: 38.2 % (ref 34–46.6)
HDLC SERPL-MCNC: 54 MG/DL
HGB BLD-MCNC: 13.1 G/DL (ref 11.1–15.9)
HGB UR QL STRIP: ABNORMAL
IMM GRANULOCYTES # BLD AUTO: 0 X10E3/UL (ref 0–0.1)
IMM GRANULOCYTES NFR BLD AUTO: 1 %
KETONES UR QL STRIP: NEGATIVE
LDLC SERPL CALC-MCNC: 101 MG/DL (ref 0–99)
LEUKOCYTE ESTERASE UR QL STRIP: ABNORMAL
LYMPHOCYTES # BLD AUTO: 1.7 X10E3/UL (ref 0.7–3.1)
LYMPHOCYTES NFR BLD AUTO: 26 %
MCH RBC QN AUTO: 29.5 PG (ref 26.6–33)
MCHC RBC AUTO-ENTMCNC: 34.3 G/DL (ref 31.5–35.7)
MCV RBC AUTO: 86 FL (ref 79–97)
MICRO URNS: ABNORMAL
MICROALBUMIN UR-MCNC: 76.5 UG/ML
MONOCYTES # BLD AUTO: 0.6 X10E3/UL (ref 0.1–0.9)
MONOCYTES NFR BLD AUTO: 9 %
MUCOUS THREADS URNS QL MICRO: PRESENT
NEUTROPHILS # BLD AUTO: 4.1 X10E3/UL (ref 1.4–7)
NEUTROPHILS NFR BLD AUTO: 61 %
NITRITE UR QL STRIP: NEGATIVE
PH UR STRIP: 5 [PH] (ref 5–7.5)
PLATELET # BLD AUTO: 266 X10E3/UL (ref 150–450)
POTASSIUM SERPL-SCNC: 4.4 MMOL/L (ref 3.5–5.2)
PROT SERPL-MCNC: 7.8 G/DL (ref 6–8.5)
PROT UR QL STRIP: NEGATIVE
RBC # BLD AUTO: 4.44 X10E6/UL (ref 3.77–5.28)
RBC #/AREA URNS HPF: ABNORMAL /HPF (ref 0–2)
SODIUM SERPL-SCNC: 140 MMOL/L (ref 134–144)
SP GR UR: 1.01 (ref 1–1.03)
TRIGL SERPL-MCNC: 121 MG/DL (ref 0–149)
URATE SERPL-MCNC: 8 MG/DL (ref 2.5–7.1)
UROBILINOGEN UR STRIP-MCNC: 0.2 MG/DL (ref 0.2–1)
VLDLC SERPL CALC-MCNC: 24 MG/DL (ref 5–40)
WBC # BLD AUTO: 6.5 X10E3/UL (ref 3.4–10.8)
WBC #/AREA URNS HPF: ABNORMAL /HPF (ref 0–5)

## 2020-09-02 RX ORDER — METFORMIN HYDROCHLORIDE 1000 MG/1
TABLET ORAL
Qty: 90 TAB | Refills: 1 | Status: SHIPPED | OUTPATIENT
Start: 2020-09-02 | End: 2021-02-13

## 2020-09-02 RX ORDER — CLONIDINE HYDROCHLORIDE 0.2 MG/1
TABLET ORAL
Qty: 180 TAB | Refills: 1 | Status: SHIPPED | OUTPATIENT
Start: 2020-09-02 | End: 2021-02-13

## 2020-09-02 RX ORDER — HYDROCHLOROTHIAZIDE 25 MG/1
TABLET ORAL
Qty: 90 TAB | Refills: 1 | Status: SHIPPED | OUTPATIENT
Start: 2020-09-02 | End: 2021-02-13

## 2020-09-02 RX ORDER — PRAVASTATIN SODIUM 40 MG/1
TABLET ORAL
Qty: 90 TAB | Refills: 1 | Status: SHIPPED | OUTPATIENT
Start: 2020-09-02 | End: 2021-02-13

## 2020-09-02 RX ORDER — LISINOPRIL 20 MG/1
TABLET ORAL
Qty: 90 TAB | Refills: 1 | Status: SHIPPED | OUTPATIENT
Start: 2020-09-02 | End: 2021-02-13

## 2020-09-08 ENCOUNTER — TELEPHONE (OUTPATIENT)
Dept: PRIMARY CARE CLINIC | Age: 70
End: 2020-09-08

## 2020-09-14 DIAGNOSIS — Z12.39 SCREENING FOR BREAST CANCER: ICD-10-CM

## 2020-10-13 RX ORDER — ALLOPURINOL 100 MG/1
TABLET ORAL
Qty: 90 TAB | Refills: 0 | Status: SHIPPED | OUTPATIENT
Start: 2020-10-13 | End: 2021-02-03

## 2020-11-12 ENCOUNTER — TELEPHONE (OUTPATIENT)
Dept: PRIMARY CARE CLINIC | Age: 70
End: 2020-11-12

## 2020-11-12 NOTE — TELEPHONE ENCOUNTER
Patient last seen 08/20/2020 for arthritis problems. She was seen 05/03/2019 for congestion, cough and reactive airway disease and was given Benzonatate 200mg capsules. I could'nt find any cough syrup listed in [employer] or Epic that was given to her in the past. Tried to reach pt but was unsuccessful. Thanks.

## 2020-11-13 ENCOUNTER — TELEPHONE (OUTPATIENT)
Dept: PRIMARY CARE CLINIC | Age: 70
End: 2020-11-13

## 2020-11-13 VITALS
DIASTOLIC BLOOD PRESSURE: 87 MMHG | SYSTOLIC BLOOD PRESSURE: 178 MMHG | RESPIRATION RATE: 18 BRPM | OXYGEN SATURATION: 96 % | WEIGHT: 239.5 LBS | HEIGHT: 65 IN | TEMPERATURE: 98.1 F | HEART RATE: 62 BPM | BODY MASS INDEX: 39.9 KG/M2

## 2020-11-13 PROBLEM — E78.00 HYPERCHOLESTEROLEMIA: Status: ACTIVE | Noted: 2020-11-13

## 2020-11-13 PROBLEM — J45.909 ASTHMA: Status: ACTIVE | Noted: 2020-02-10

## 2020-11-13 PROBLEM — K62.5 PAINLESS RECTAL BLEEDING: Status: ACTIVE | Noted: 2018-11-30

## 2020-11-13 PROBLEM — M19.90 ARTHRITIS: Status: ACTIVE | Noted: 2020-11-13

## 2020-11-13 PROBLEM — H61.20 IMPACTED CERUMEN: Status: ACTIVE | Noted: 2020-11-13

## 2020-11-13 PROBLEM — J01.90 ACUTE SINUSITIS: Status: ACTIVE | Noted: 2020-11-13

## 2020-11-13 PROBLEM — N83.209 CYST OF OVARY: Status: ACTIVE | Noted: 2020-11-13

## 2020-11-13 RX ORDER — AZITHROMYCIN 250 MG/1
250 TABLET, FILM COATED ORAL DAILY
COMMUNITY
End: 2020-11-17 | Stop reason: ALTCHOICE

## 2020-11-13 RX ORDER — ALBUTEROL SULFATE 0.83 MG/ML
SOLUTION RESPIRATORY (INHALATION)
Status: ON HOLD | COMMUNITY
End: 2020-11-27 | Stop reason: SDUPTHER

## 2020-11-13 RX ORDER — PREDNISOLONE ACETATE 10 MG/ML
1 SUSPENSION/ DROPS OPHTHALMIC 4 TIMES DAILY
COMMUNITY
End: 2020-11-17 | Stop reason: ALTCHOICE

## 2020-11-13 RX ORDER — AMOXICILLIN AND CLAVULANATE POTASSIUM 875; 125 MG/1; MG/1
TABLET, FILM COATED ORAL EVERY 12 HOURS
COMMUNITY
End: 2020-11-17 | Stop reason: ALTCHOICE

## 2020-11-13 RX ORDER — PNEUMOCOCCAL 13-VALENT CONJUGATE VACCINE 2.2; 2.2; 2.2; 2.2; 2.2; 4.4; 2.2; 2.2; 2.2; 2.2; 2.2; 2.2; 2.2 UG/.5ML; UG/.5ML; UG/.5ML; UG/.5ML; UG/.5ML; UG/.5ML; UG/.5ML; UG/.5ML; UG/.5ML; UG/.5ML; UG/.5ML; UG/.5ML; UG/.5ML
0.5 INJECTION, SUSPENSION INTRAMUSCULAR
COMMUNITY
End: 2020-11-17 | Stop reason: ALTCHOICE

## 2020-11-13 RX ORDER — OFLOXACIN 3 MG/ML
5 SOLUTION AURICULAR (OTIC) DAILY
COMMUNITY
End: 2020-11-17 | Stop reason: ALTCHOICE

## 2020-11-13 RX ORDER — PREDNISONE 20 MG/1
TABLET ORAL
COMMUNITY
End: 2020-11-17 | Stop reason: ALTCHOICE

## 2020-11-13 RX ORDER — BENZONATATE 100 MG/1
100 CAPSULE ORAL
COMMUNITY
End: 2020-11-17 | Stop reason: ALTCHOICE

## 2020-11-13 NOTE — TELEPHONE ENCOUNTER
She can use Mucinex DM OTC  If she needs a prescription strength, will need OV Virtual would be OK (2) assistive person

## 2020-11-17 ENCOUNTER — OFFICE VISIT (OUTPATIENT)
Dept: PRIMARY CARE CLINIC | Age: 70
End: 2020-11-17
Payer: MEDICARE

## 2020-11-17 DIAGNOSIS — J45.21 MILD INTERMITTENT ASTHMA WITH ACUTE EXACERBATION: ICD-10-CM

## 2020-11-17 DIAGNOSIS — R05.3 PERSISTENT COUGH: Primary | ICD-10-CM

## 2020-11-17 DIAGNOSIS — R06.02 SHORTNESS OF BREATH: ICD-10-CM

## 2020-11-17 PROCEDURE — 99213 OFFICE O/P EST LOW 20 MIN: CPT | Performed by: NURSE PRACTITIONER

## 2020-11-17 RX ORDER — AZITHROMYCIN 250 MG/1
TABLET, FILM COATED ORAL
Qty: 6 TAB | Refills: 0 | Status: ON HOLD | OUTPATIENT
Start: 2020-11-17 | End: 2020-11-27 | Stop reason: SDUPTHER

## 2020-11-17 RX ORDER — PREDNISONE 20 MG/1
20 TABLET ORAL
Qty: 5 TAB | Refills: 0 | Status: SHIPPED | OUTPATIENT
Start: 2020-11-17 | End: 2020-11-22 | Stop reason: ALTCHOICE

## 2020-11-17 RX ORDER — ALBUTEROL SULFATE 90 UG/1
1 AEROSOL, METERED RESPIRATORY (INHALATION)
Qty: 1 INHALER | Refills: 3 | Status: ON HOLD | OUTPATIENT
Start: 2020-11-17 | End: 2020-11-27 | Stop reason: SDUPTHER

## 2020-11-18 VITALS — OXYGEN SATURATION: 94 % | RESPIRATION RATE: 22 BRPM | TEMPERATURE: 98.3 F | HEART RATE: 89 BPM

## 2020-11-18 NOTE — PROGRESS NOTES
Ashanti Rogers is a 65 y.o. female who presents to the office today for the following:    Chief Complaint   Patient presents with    Cough    Nasal Congestion       Past Medical History:   Diagnosis Date    Acute sinusitis 11/13/2020    Arthritis     Asthma 2/10/2020    Cyst of ovary 11/13/2020    Essential (primary) hypertension 9/26/2017    Glaucoma 11/30/2018    Gout 8/9/2019    Hypercholesterolemia 11/13/2020    Impacted cerumen 11/13/2020    Mild intermittent asthma, uncomplicated 2/04/4605    Mixed hyperlipidemia due to type 2 diabetes mellitus ([facility] [address] 9/26/2017    Painless rectal bleeding 11/30/2018    Type 2 diabetes mellitus without complications ([facility] [address] 1/46/4017       Past Surgical History:   Procedure Laterality Date    HX COLONOSCOPY      HX HERNIA REPAIR  Unknown date    HX KNEE ARTHROSCOPY Right 01/01/2013    HX OTHER SURGICAL  05/16/2017    Eye surgery procedure    HX PARTIAL HYSTERECTOMY  Unknown date        Family History   Problem Relation Age of Onset    Arthritis-osteo Mother     Hypertension Other     Heart Disease Other     Diabetes Other         Social History     Tobacco Use    Smoking status: Former Smoker     Types: Cigarettes    Smokeless tobacco: Never Used    Tobacco comment: At least 30 years ago. Substance Use Topics    Alcohol use: Never     Frequency: Never    Drug use: Never        HPI  Patient here with c/o 2-3 weeks of persistent cough,wheezing and sob. States that has h/o asthma which has been fairly stable until now. Has nebulizer treatments at home but did not have a machine anymore or inhaler. Does not think she has had fever but does frequently get \"hot. \" Activity and appetite has been decreased. Current Outpatient Medications on File Prior to Visit   Medication Sig    albuterol (PROVENTIL VENTOLIN) 2.5 mg /3 mL (0.083 %) nebu by Nebulization route.     allopurinoL (ZYLOPRIM) 100 mg tablet TAKE 1 TABLET EVERY DAY    pravastatin (PRAVACHOL) 40 mg tablet TAKE 1 TABLET EVERY DAY    hydroCHLOROthiazide (HYDRODIURIL) 25 mg tablet TAKE 1 TABLET EVERY DAY    cloNIDine HCL (CATAPRES) 0.2 mg tablet TAKE 1 TABLET TWICE DAILY    metFORMIN (GLUCOPHAGE) 1,000 mg tablet TAKE 1/2 TABLET TWICE A DAY    lisinopriL (PRINIVIL, ZESTRIL) 20 mg tablet TAKE 1 TABLET EVERY DAY    glipiZIDE (GLUCOTROL) 5 mg tablet Take 2 Tabs by mouth two (2) times a day for 180 days. Take 2 TABS by mouth twice daily.  atenoloL (TENORMIN) 50 mg tablet Take 1 Tab by mouth daily. Indications: high blood pressure    diclofenac (VOLTAREN) 1 % gel Apply 2 g to affected area four (4) times daily. As needed for arthritis pain    amLODIPine (NORVASC) 10 mg tablet TAKE 1 TABLET EVERY DAY    colchicine (Colcrys) 0.6 mg tablet Take 0.6 mg by mouth two (2) times a day.  True Metrix Glucose Test Strip strip USE TO CHECK BLOOD SUGAR DAILY    TRUEplus Lancets 28 gauge misc USE TO CHECK BLOOD SUGAR DAILY     No current facility-administered medications on file prior to visit. Medications Ordered Today   Medications    albuterol (PROVENTIL HFA, VENTOLIN HFA, PROAIR HFA) 90 mcg/actuation inhaler     Sig: Take 1 Puff by inhalation every four (4) hours as needed for Wheezing. Dispense:  1 Inhaler     Refill:  3    azithromycin (ZITHROMAX) 250 mg tablet     Sig: Take 2 tablets today, then take 1 tablet daily x 4 days     Dispense:  6 Tab     Refill:  0    predniSONE (DELTASONE) 20 mg tablet     Sig: Take 20 mg by mouth daily (with breakfast) for 5 days. Dispense:  5 Tab     Refill:  0        Review of Systems   Constitutional: Positive for malaise/fatigue. Negative for chills, diaphoresis and fever. HENT: Positive for congestion. Negative for ear pain, sinus pain and sore throat. Respiratory: Positive for cough, sputum production, shortness of breath and wheezing. Cardiovascular: Negative. Gastrointestinal: Negative. Genitourinary: Negative.     Musculoskeletal: Positive for myalgias. Neurological: Negative. Visit Vitals  Pulse 89   Temp 98.3 °F (36.8 °C)   Resp 22   SpO2 94%       Physical Exam  Vitals signs and nursing note reviewed. Constitutional:       Appearance: Normal appearance. HENT:      Right Ear: Tympanic membrane normal.      Left Ear: Tympanic membrane normal.      Nose: Nose normal.      Mouth/Throat:      Mouth: Mucous membranes are moist.      Pharynx: Oropharynx is clear. Eyes:      Pupils: Pupils are equal, round, and reactive to light. Cardiovascular:      Rate and Rhythm: Normal rate and regular rhythm. Pulses: Normal pulses. Heart sounds: Normal heart sounds. Pulmonary:      Effort: Pulmonary effort is normal.      Breath sounds: Wheezing and rales (RLL) present. Abdominal:      General: Bowel sounds are normal.      Palpations: Abdomen is soft. Musculoskeletal: Normal range of motion. Skin:     General: Skin is warm and dry. Neurological:      Mental Status: She is alert. Mental status is at baseline. 1. Persistent cough    - XR CHEST PA LAT; Future  - NOVEL CORONAVIRUS (COVID-19)    2. Mild intermittent asthma with acute exacerbation        3.  Shortness of breath      Patient reports rare asthma symptoms prior to starting with cough about 2-3 weeks ago  Going to rule out covid 19 and test sent today  She has rales in RLL which could indicate a focal pneumonia so going to start on antibiotic as directed  Also prednisone as directed  Refilled albuterol inhaler to use prn for wheezing/sob  CXR ordered  Encouraged supportive care with rest, increased fluids, tylenol prn for pain or fever and cool mist humidifier  We reviewed CDC home isolation guidelines and will discuss further recommendations pending results from test  Advised if she develops breathing difficulty, chest pain or other worsening symptoms, seek emergency care       Patient verbalizes understanding of plan of care as discussed above    Follow-up and Dispositions    · Return if symptoms worsen or fail to improve.

## 2020-11-19 ENCOUNTER — TELEPHONE (OUTPATIENT)
Dept: PRIMARY CARE CLINIC | Age: 70
End: 2020-11-19

## 2020-11-19 DIAGNOSIS — R05.3 PERSISTENT COUGH: Primary | ICD-10-CM

## 2020-11-19 LAB — SARS-COV-2, NAA: DETECTED

## 2020-11-19 RX ORDER — BENZONATATE 100 MG/1
100 CAPSULE ORAL
Qty: 21 CAP | Refills: 0 | Status: SHIPPED | OUTPATIENT
Start: 2020-11-19 | End: 2020-11-27

## 2020-11-19 NOTE — PROGRESS NOTES
Patient notified of test results and at this time appears to be doing ok. She will let us know if she has any worsening. She lives with  who does not have any symptoms but he will monitor for this.

## 2020-11-19 NOTE — TELEPHONE ENCOUNTER
Spoke with patient. Symptoms not worsening but would like some cough medication. Still awaiting her covid results which will call with once available. Advised if increasing sob or chest pain, ect go immediately to ED and she agrees.

## 2020-11-20 RX ORDER — AMLODIPINE BESYLATE 10 MG/1
TABLET ORAL
Qty: 90 TAB | Refills: 0 | Status: SHIPPED | OUTPATIENT
Start: 2020-11-20 | End: 2021-01-18

## 2020-11-22 ENCOUNTER — HOSPITAL ENCOUNTER (INPATIENT)
Age: 70
LOS: 5 days | Discharge: HOME HEALTH | DRG: 177 | End: 2020-11-27
Attending: EMERGENCY MEDICINE | Admitting: FAMILY MEDICINE
Payer: MEDICARE

## 2020-11-22 ENCOUNTER — APPOINTMENT (OUTPATIENT)
Dept: GENERAL RADIOLOGY | Age: 70
DRG: 177 | End: 2020-11-22
Attending: EMERGENCY MEDICINE
Payer: MEDICARE

## 2020-11-22 DIAGNOSIS — R05.3 PERSISTENT COUGH: ICD-10-CM

## 2020-11-22 DIAGNOSIS — R09.02 HYPOXIA: ICD-10-CM

## 2020-11-22 DIAGNOSIS — E87.6 HYPOKALEMIA: ICD-10-CM

## 2020-11-22 DIAGNOSIS — E83.42 HYPOMAGNESEMIA: ICD-10-CM

## 2020-11-22 DIAGNOSIS — U07.1 COVID-19 VIRUS INFECTION: Primary | ICD-10-CM

## 2020-11-22 PROBLEM — J18.9 PNEUMONIA: Status: ACTIVE | Noted: 2020-11-22

## 2020-11-22 LAB
ALBUMIN SERPL-MCNC: 3.2 G/DL (ref 3.5–5)
ALBUMIN/GLOB SERPL: 0.6 {RATIO} (ref 1.1–2.2)
ALP SERPL-CCNC: 85 U/L (ref 45–117)
ALT SERPL-CCNC: 46 U/L (ref 12–78)
ANION GAP SERPL CALC-SCNC: 7 MMOL/L (ref 5–15)
APPEARANCE UR: CLEAR
APTT PPP: 25.7 SEC (ref 23–35.7)
AST SERPL W P-5'-P-CCNC: 34 U/L (ref 15–37)
BACTERIA URNS QL MICRO: NEGATIVE /HPF
BASOPHILS # BLD: 0 K/UL (ref 0–0.1)
BASOPHILS NFR BLD: 0 % (ref 0–1)
BILIRUB SERPL-MCNC: 0.3 MG/DL (ref 0.2–1)
BILIRUB UR QL: NEGATIVE
BNP SERPL-MCNC: 57 PG/ML
BUN SERPL-MCNC: 13 MG/DL (ref 6–20)
BUN/CREAT SERPL: 15 (ref 12–20)
CA-I BLD-MCNC: 9.6 MG/DL (ref 8.5–10.1)
CHLORIDE SERPL-SCNC: 99 MMOL/L (ref 97–108)
CO2 SERPL-SCNC: 34 MMOL/L (ref 21–32)
COLOR UR: ABNORMAL
CREAT SERPL-MCNC: 0.86 MG/DL (ref 0.55–1.02)
DIFFERENTIAL METHOD BLD: ABNORMAL
EOSINOPHIL # BLD: 0 K/UL (ref 0–0.4)
EOSINOPHIL NFR BLD: 1 % (ref 0–7)
ERYTHROCYTE [DISTWIDTH] IN BLOOD BY AUTOMATED COUNT: 13.9 % (ref 11.5–14.5)
GLOBULIN SER CALC-MCNC: 5.3 G/DL (ref 2–4)
GLUCOSE BLD STRIP.AUTO-MCNC: 214 MG/DL (ref 65–100)
GLUCOSE SERPL-MCNC: 207 MG/DL (ref 65–100)
GLUCOSE UR STRIP.AUTO-MCNC: NEGATIVE MG/DL
HCT VFR BLD AUTO: 39.2 % (ref 35–47)
HGB BLD-MCNC: 12.7 G/DL (ref 11.5–16)
HGB UR QL STRIP: ABNORMAL
IMM GRANULOCYTES # BLD AUTO: 0 K/UL (ref 0–0.04)
IMM GRANULOCYTES NFR BLD AUTO: 1 % (ref 0–0.5)
INR PPP: 1 (ref 0.9–1.1)
KETONES UR QL STRIP.AUTO: NEGATIVE MG/DL
LACTATE SERPL-SCNC: 1.8 MMOL/L (ref 0.4–2)
LACTATE SERPL-SCNC: 2.3 MMOL/L (ref 0.4–2)
LEUKOCYTE ESTERASE UR QL STRIP.AUTO: ABNORMAL
LYMPHOCYTES # BLD: 1.3 K/UL (ref 0.8–3.5)
LYMPHOCYTES NFR BLD: 18 % (ref 12–49)
MAGNESIUM SERPL-MCNC: 1.5 MG/DL (ref 1.6–2.4)
MCH RBC QN AUTO: 29.7 PG (ref 26–34)
MCHC RBC AUTO-ENTMCNC: 32.4 G/DL (ref 30–36.5)
MCV RBC AUTO: 91.6 FL (ref 80–99)
MONOCYTES # BLD: 0.6 K/UL (ref 0–1)
MONOCYTES NFR BLD: 8 % (ref 5–13)
MUCOUS THREADS URNS QL MICRO: ABNORMAL /LPF
NEUTS SEG # BLD: 5.3 K/UL (ref 1.8–8)
NEUTS SEG NFR BLD: 72 % (ref 32–75)
NITRITE UR QL STRIP.AUTO: NEGATIVE
PERFORMED BY, TECHID: ABNORMAL
PH UR STRIP: 6 [PH] (ref 5–8)
PLATELET # BLD AUTO: 389 K/UL (ref 150–400)
PMV BLD AUTO: 9.4 FL (ref 8.9–12.9)
POTASSIUM SERPL-SCNC: 3.3 MMOL/L (ref 3.5–5.1)
PROCALCITONIN SERPL-MCNC: <0.05 NG/ML
PROCALCITONIN SERPL-MCNC: <0.05 NG/ML
PROT SERPL-MCNC: 8.5 G/DL (ref 6.4–8.2)
PROT UR STRIP-MCNC: 30 MG/DL
PROTHROMBIN TIME: 13.1 SEC (ref 11.9–14.7)
RBC # BLD AUTO: 4.28 M/UL (ref 3.8–5.2)
RBC #/AREA URNS HPF: ABNORMAL /HPF (ref 0–5)
SODIUM SERPL-SCNC: 140 MMOL/L (ref 136–145)
SP GR UR REFRACTOMETRY: 1.01 (ref 1–1.03)
THERAPEUTIC RANGE,PTTT: NORMAL SEC (ref 68–109)
TROPONIN I SERPL-MCNC: <0.05 NG/ML
TSH SERPL DL<=0.05 MIU/L-ACNC: 0.85 UIU/ML (ref 0.36–3.74)
UA: UC IF INDICATED,UAUC: ABNORMAL
UROBILINOGEN UR QL STRIP.AUTO: 0.1 EU/DL (ref 0.1–1)
WBC # BLD AUTO: 7.3 K/UL (ref 3.6–11)
WBC URNS QL MICRO: ABNORMAL /HPF (ref 0–4)

## 2020-11-22 PROCEDURE — 74011000250 HC RX REV CODE- 250: Performed by: FAMILY MEDICINE

## 2020-11-22 PROCEDURE — 85025 COMPLETE CBC W/AUTO DIFF WBC: CPT

## 2020-11-22 PROCEDURE — 84484 ASSAY OF TROPONIN QUANT: CPT

## 2020-11-22 PROCEDURE — 85730 THROMBOPLASTIN TIME PARTIAL: CPT

## 2020-11-22 PROCEDURE — 80053 COMPREHEN METABOLIC PANEL: CPT

## 2020-11-22 PROCEDURE — 74011000258 HC RX REV CODE- 258: Performed by: FAMILY MEDICINE

## 2020-11-22 PROCEDURE — 83605 ASSAY OF LACTIC ACID: CPT

## 2020-11-22 PROCEDURE — 84145 PROCALCITONIN (PCT): CPT

## 2020-11-22 PROCEDURE — 96375 TX/PRO/DX INJ NEW DRUG ADDON: CPT

## 2020-11-22 PROCEDURE — 74011636637 HC RX REV CODE- 636/637: Performed by: FAMILY MEDICINE

## 2020-11-22 PROCEDURE — 85610 PROTHROMBIN TIME: CPT

## 2020-11-22 PROCEDURE — 82962 GLUCOSE BLOOD TEST: CPT

## 2020-11-22 PROCEDURE — 74011250636 HC RX REV CODE- 250/636: Performed by: EMERGENCY MEDICINE

## 2020-11-22 PROCEDURE — 96366 THER/PROPH/DIAG IV INF ADDON: CPT

## 2020-11-22 PROCEDURE — 74011000258 HC RX REV CODE- 258: Performed by: EMERGENCY MEDICINE

## 2020-11-22 PROCEDURE — 96365 THER/PROPH/DIAG IV INF INIT: CPT

## 2020-11-22 PROCEDURE — 83735 ASSAY OF MAGNESIUM: CPT

## 2020-11-22 PROCEDURE — 74011250637 HC RX REV CODE- 250/637: Performed by: FAMILY MEDICINE

## 2020-11-22 PROCEDURE — 71045 X-RAY EXAM CHEST 1 VIEW: CPT

## 2020-11-22 PROCEDURE — 99282 EMERGENCY DEPT VISIT SF MDM: CPT

## 2020-11-22 PROCEDURE — 84443 ASSAY THYROID STIM HORMONE: CPT

## 2020-11-22 PROCEDURE — 83880 ASSAY OF NATRIURETIC PEPTIDE: CPT

## 2020-11-22 PROCEDURE — 65270000029 HC RM PRIVATE

## 2020-11-22 PROCEDURE — 96361 HYDRATE IV INFUSION ADD-ON: CPT

## 2020-11-22 PROCEDURE — 36415 COLL VENOUS BLD VENIPUNCTURE: CPT

## 2020-11-22 PROCEDURE — 96367 TX/PROPH/DG ADDL SEQ IV INF: CPT

## 2020-11-22 PROCEDURE — 74011250637 HC RX REV CODE- 250/637: Performed by: EMERGENCY MEDICINE

## 2020-11-22 PROCEDURE — 81001 URINALYSIS AUTO W/SCOPE: CPT

## 2020-11-22 RX ORDER — DEXAMETHASONE SODIUM PHOSPHATE 10 MG/ML
10 INJECTION INTRAMUSCULAR; INTRAVENOUS ONCE
Status: COMPLETED | OUTPATIENT
Start: 2020-11-22 | End: 2020-11-22

## 2020-11-22 RX ORDER — BENZONATATE 100 MG/1
100 CAPSULE ORAL
Status: DISCONTINUED | OUTPATIENT
Start: 2020-11-22 | End: 2020-11-27 | Stop reason: HOSPADM

## 2020-11-22 RX ORDER — POTASSIUM CHLORIDE 1.5 G/1.77G
40 POWDER, FOR SOLUTION ORAL
Status: COMPLETED | OUTPATIENT
Start: 2020-11-22 | End: 2020-11-22

## 2020-11-22 RX ORDER — PROMETHAZINE HYDROCHLORIDE 25 MG/1
12.5 TABLET ORAL
Status: DISCONTINUED | OUTPATIENT
Start: 2020-11-22 | End: 2020-11-27 | Stop reason: HOSPADM

## 2020-11-22 RX ORDER — CLONIDINE HYDROCHLORIDE 0.1 MG/1
0.2 TABLET ORAL 2 TIMES DAILY
Status: DISCONTINUED | OUTPATIENT
Start: 2020-11-22 | End: 2020-11-27 | Stop reason: HOSPADM

## 2020-11-22 RX ORDER — DEXTROSE 50 % IN WATER (D50W) INTRAVENOUS SYRINGE
25-50 AS NEEDED
Status: DISCONTINUED | OUTPATIENT
Start: 2020-11-22 | End: 2020-11-27 | Stop reason: HOSPADM

## 2020-11-22 RX ORDER — ENOXAPARIN SODIUM 100 MG/ML
40 INJECTION SUBCUTANEOUS DAILY
Status: DISCONTINUED | OUTPATIENT
Start: 2020-11-23 | End: 2020-11-27 | Stop reason: HOSPADM

## 2020-11-22 RX ORDER — SODIUM CHLORIDE 0.9 % (FLUSH) 0.9 %
5-40 SYRINGE (ML) INJECTION EVERY 8 HOURS
Status: DISCONTINUED | OUTPATIENT
Start: 2020-11-22 | End: 2020-11-27 | Stop reason: HOSPADM

## 2020-11-22 RX ORDER — MAGNESIUM SULFATE 100 %
4 CRYSTALS MISCELLANEOUS AS NEEDED
Status: DISCONTINUED | OUTPATIENT
Start: 2020-11-22 | End: 2020-11-27 | Stop reason: HOSPADM

## 2020-11-22 RX ORDER — ACETAMINOPHEN 650 MG/1
650 SUPPOSITORY RECTAL
Status: DISCONTINUED | OUTPATIENT
Start: 2020-11-22 | End: 2020-11-27 | Stop reason: HOSPADM

## 2020-11-22 RX ORDER — ONDANSETRON 2 MG/ML
4 INJECTION INTRAMUSCULAR; INTRAVENOUS
Status: DISCONTINUED | OUTPATIENT
Start: 2020-11-22 | End: 2020-11-27 | Stop reason: HOSPADM

## 2020-11-22 RX ORDER — LISINOPRIL 20 MG/1
20 TABLET ORAL DAILY
Status: DISCONTINUED | OUTPATIENT
Start: 2020-11-23 | End: 2020-11-26

## 2020-11-22 RX ORDER — MAGNESIUM SULFATE 1 G/100ML
1 INJECTION INTRAVENOUS
Status: COMPLETED | OUTPATIENT
Start: 2020-11-22 | End: 2020-11-22

## 2020-11-22 RX ORDER — POLYETHYLENE GLYCOL 3350 17 G/17G
17 POWDER, FOR SOLUTION ORAL DAILY PRN
Status: DISCONTINUED | OUTPATIENT
Start: 2020-11-22 | End: 2020-11-27 | Stop reason: HOSPADM

## 2020-11-22 RX ORDER — ACETAMINOPHEN 325 MG/1
650 TABLET ORAL
Status: DISCONTINUED | OUTPATIENT
Start: 2020-11-22 | End: 2020-11-27 | Stop reason: HOSPADM

## 2020-11-22 RX ORDER — INSULIN LISPRO 100 [IU]/ML
INJECTION, SOLUTION INTRAVENOUS; SUBCUTANEOUS
Status: DISCONTINUED | OUTPATIENT
Start: 2020-11-22 | End: 2020-11-27 | Stop reason: HOSPADM

## 2020-11-22 RX ORDER — ALBUTEROL SULFATE 90 UG/1
2 AEROSOL, METERED RESPIRATORY (INHALATION)
Status: DISCONTINUED | OUTPATIENT
Start: 2020-11-22 | End: 2020-11-27 | Stop reason: HOSPADM

## 2020-11-22 RX ORDER — SODIUM CHLORIDE 0.9 % (FLUSH) 0.9 %
5-40 SYRINGE (ML) INJECTION AS NEEDED
Status: DISCONTINUED | OUTPATIENT
Start: 2020-11-22 | End: 2020-11-27 | Stop reason: HOSPADM

## 2020-11-22 RX ORDER — ATORVASTATIN CALCIUM 10 MG/1
10 TABLET, FILM COATED ORAL
Status: DISCONTINUED | OUTPATIENT
Start: 2020-11-22 | End: 2020-11-27 | Stop reason: HOSPADM

## 2020-11-22 RX ORDER — AMLODIPINE BESYLATE 5 MG/1
10 TABLET ORAL DAILY
Status: DISCONTINUED | OUTPATIENT
Start: 2020-11-23 | End: 2020-11-27 | Stop reason: HOSPADM

## 2020-11-22 RX ORDER — ATENOLOL 50 MG/1
50 TABLET ORAL DAILY
Status: DISCONTINUED | OUTPATIENT
Start: 2020-11-23 | End: 2020-11-27 | Stop reason: HOSPADM

## 2020-11-22 RX ORDER — DEXAMETHASONE SODIUM PHOSPHATE 4 MG/ML
6 INJECTION, SOLUTION INTRA-ARTICULAR; INTRALESIONAL; INTRAMUSCULAR; INTRAVENOUS; SOFT TISSUE DAILY
Status: DISCONTINUED | OUTPATIENT
Start: 2020-11-23 | End: 2020-11-27 | Stop reason: HOSPADM

## 2020-11-22 RX ADMIN — POTASSIUM CHLORIDE 40 MEQ: 1.5 FOR SOLUTION ORAL at 20:43

## 2020-11-22 RX ADMIN — INSULIN LISPRO 4 UNITS: 100 INJECTION, SOLUTION INTRAVENOUS; SUBCUTANEOUS at 21:45

## 2020-11-22 RX ADMIN — SODIUM CHLORIDE 1000 ML: 9 INJECTION, SOLUTION INTRAVENOUS at 18:00

## 2020-11-22 RX ADMIN — MAGNESIUM SULFATE HEPTAHYDRATE 1 G: 1 INJECTION, SOLUTION INTRAVENOUS at 20:43

## 2020-11-22 RX ADMIN — CEFTRIAXONE 1 G: 1 INJECTION, POWDER, FOR SOLUTION INTRAMUSCULAR; INTRAVENOUS at 18:00

## 2020-11-22 RX ADMIN — ATORVASTATIN CALCIUM 10 MG: 10 TABLET, FILM COATED ORAL at 21:45

## 2020-11-22 RX ADMIN — DEXAMETHASONE SODIUM PHOSPHATE 10 MG: 10 INJECTION, SOLUTION INTRAMUSCULAR; INTRAVENOUS at 18:00

## 2020-11-22 RX ADMIN — AZITHROMYCIN MONOHYDRATE 500 MG: 500 INJECTION, POWDER, LYOPHILIZED, FOR SOLUTION INTRAVENOUS at 18:00

## 2020-11-22 RX ADMIN — SODIUM CHLORIDE 200 MG: 9 INJECTION, SOLUTION INTRAVENOUS at 20:41

## 2020-11-22 RX ADMIN — CLONIDINE HYDROCHLORIDE 0.2 MG: 0.1 TABLET ORAL at 20:43

## 2020-11-22 NOTE — ED PROVIDER NOTES
EMERGENCY DEPARTMENT HISTORY AND PHYSICAL EXAM      Date: 11/22/2020  Patient Name: Chanda Valverde    History of Presenting Illness     Chief Complaint   Patient presents with    Shortness of Breath    Concern For COVID-19 (Coronavirus)       History Provided By: Patient    HPI: Chanda Valverde, 65 y.o. female with a past medical history significant hypertension and asthma presents to the ED with chief complaint of Shortness of Breath and Concern For COVID-19 (Coronavirus)  . Patient was diagnosed with Covid on Thursday. She is been having progressive shortness of breath coughing generalized weakness. Lightheaded dizzy like she might pass out. The breathing got worse today which prompted her to get treated. No nausea vomiting diarrhea. Unsure about any fevers. No recent antibiotics or antipyretics. Has not used her inhalers yet today. There are no other complaints, changes, or physical findings at this time. PCP: Jamaica Childs MD    Current Facility-Administered Medications   Medication Dose Route Frequency Provider Last Rate Last Dose    sodium chloride 0.9 % bolus infusion 1,000 mL  1,000 mL IntraVENous ONCE Romi Carreon MD 1,000 mL/hr at 11/22/20 1800 1,000 mL at 11/22/20 1800    azithromycin (ZITHROMAX) 500 mg in 0.9% sodium chloride 250 mL (VIAL-MATE)  500 mg IntraVENous NOW Lawanda Whalen MD   500 mg at 11/22/20 1800    potassium chloride (KLOR-CON) packet for solution 40 mEq  40 mEq Oral NOW Romi Carreon MD        magnesium sulfate 1 g/100 ml IVPB (premix or compounded)  1 g IntraVENous NOW Darwin WALTERS MD         Current Outpatient Medications   Medication Sig Dispense Refill    amLODIPine (NORVASC) 10 mg tablet TAKE 1 TABLET EVERY DAY 90 Tab 0    benzonatate (TESSALON) 100 mg capsule Take 1 Cap by mouth three (3) times daily as needed for Cough for up to 7 days.  21 Cap 0    albuterol (PROVENTIL HFA, VENTOLIN HFA, PROAIR HFA) 90 mcg/actuation inhaler Take 1 Puff by inhalation every four (4) hours as needed for Wheezing. 1 Inhaler 3    azithromycin (ZITHROMAX) 250 mg tablet Take 2 tablets today, then take 1 tablet daily x 4 days 6 Tab 0    predniSONE (DELTASONE) 20 mg tablet Take 20 mg by mouth daily (with breakfast) for 5 days. 5 Tab 0    albuterol (PROVENTIL VENTOLIN) 2.5 mg /3 mL (0.083 %) nebu by Nebulization route.  allopurinoL (ZYLOPRIM) 100 mg tablet TAKE 1 TABLET EVERY DAY 90 Tab 0    pravastatin (PRAVACHOL) 40 mg tablet TAKE 1 TABLET EVERY DAY 90 Tab 1    hydroCHLOROthiazide (HYDRODIURIL) 25 mg tablet TAKE 1 TABLET EVERY DAY 90 Tab 1    cloNIDine HCL (CATAPRES) 0.2 mg tablet TAKE 1 TABLET TWICE DAILY 180 Tab 1    metFORMIN (GLUCOPHAGE) 1,000 mg tablet TAKE 1/2 TABLET TWICE A DAY 90 Tab 1    lisinopriL (PRINIVIL, ZESTRIL) 20 mg tablet TAKE 1 TABLET EVERY DAY 90 Tab 1    glipiZIDE (GLUCOTROL) 5 mg tablet Take 2 Tabs by mouth two (2) times a day for 180 days. Take 2 TABS by mouth twice daily. 360 Tab 1    atenoloL (TENORMIN) 50 mg tablet Take 1 Tab by mouth daily. Indications: high blood pressure 90 Tab 1    diclofenac (VOLTAREN) 1 % gel Apply 2 g to affected area four (4) times daily. As needed for arthritis pain 100 g 1    colchicine (Colcrys) 0.6 mg tablet Take 0.6 mg by mouth two (2) times a day.       True Metrix Glucose Test Strip strip USE TO CHECK BLOOD SUGAR DAILY      TRUEplus Lancets 28 gauge misc USE TO CHECK BLOOD SUGAR DAILY         Past History     Past Medical History:  Past Medical History:   Diagnosis Date    Acute sinusitis 11/13/2020    Arthritis     Asthma 2/10/2020    Cyst of ovary 11/13/2020    Essential (primary) hypertension 9/26/2017    Glaucoma 11/30/2018    Gout 8/9/2019    Hypercholesterolemia 11/13/2020    Impacted cerumen 11/13/2020    Mild intermittent asthma, uncomplicated 7/19/4881    Mixed hyperlipidemia due to type 2 diabetes mellitus ([facility] [address] 9/26/2017    Painless rectal bleeding 11/30/2018    Type 2 diabetes mellitus without complications ([facility] 6/31/5351       Past Surgical History:  Past Surgical History:   Procedure Laterality Date    HX COLONOSCOPY      HX HERNIA REPAIR  Unknown date    HX KNEE ARTHROSCOPY Right 01/01/2013    HX OTHER SURGICAL  05/16/2017    Eye surgery procedure    HX PARTIAL HYSTERECTOMY  Unknown date       Family History:  Family History   Problem Relation Age of Onset   [address] Arthritis-osteo Mother     Hypertension Other     Heart Disease Other     Diabetes Other        Social History:  Social History     Tobacco Use    Smoking status: Former Smoker     Types: Cigarettes    Smokeless tobacco: Never Used    Tobacco comment: At least 30 years ago. Substance Use Topics    Alcohol use: Never     Frequency: Never    Drug use: Never       Allergies: Allergies   Allergen Reactions    Aspirin Shortness of Breath     Respiratory distress    Codeine Vertigo         Review of Systems   Review of Systems   Constitutional: Positive for chills and fatigue. Negative for diaphoresis. HENT: Positive for congestion and sore throat. Negative for ear pain and nosebleeds. Eyes: Negative. Negative for pain, discharge and redness. Respiratory: Positive for cough and shortness of breath. Negative for chest tightness and wheezing. Cardiovascular: Negative. Negative for chest pain, palpitations and leg swelling. Gastrointestinal: Negative. Negative for abdominal pain, constipation, diarrhea, nausea and vomiting. Endocrine: Negative. Negative for cold intolerance. Genitourinary: Negative. Negative for difficulty urinating, dysuria and hematuria. Musculoskeletal: Negative. Negative for arthralgias, joint swelling and neck pain. Skin: Negative. Negative for color change, pallor, rash and wound. Allergic/Immunologic: Negative. Neurological: Positive for dizziness and weakness. Negative for syncope, light-headedness and headaches. Hematological: Negative.   Does not bruise/bleed easily. Psychiatric/Behavioral: Negative. Negative for behavioral problems, confusion and suicidal ideas. All other systems reviewed and are negative. Physical Exam   Physical Exam  Vitals signs and nursing note reviewed. Exam conducted with a chaperone present. Constitutional:       General: She is in acute distress. Appearance: Normal appearance. She is normal weight. HENT:      Head: Normocephalic and atraumatic. Nose: Nose normal.      Mouth/Throat:      Mouth: Mucous membranes are moist.      Pharynx: Oropharynx is clear. Eyes:      Extraocular Movements: Extraocular movements intact. Conjunctiva/sclera: Conjunctivae normal.      Pupils: Pupils are equal, round, and reactive to light. Neck:      Musculoskeletal: Normal range of motion and neck supple. Cardiovascular:      Rate and Rhythm: Normal rate and regular rhythm. Pulses: Normal pulses. Heart sounds: Normal heart sounds. Pulmonary:      Effort: Pulmonary effort is normal. Tachypnea present. No respiratory distress. Breath sounds: Normal breath sounds. Abdominal:      General: Abdomen is flat. Bowel sounds are normal. There is no distension. Palpations: Abdomen is soft. Tenderness: There is no abdominal tenderness. There is no guarding. Musculoskeletal: Normal range of motion. General: No swelling, tenderness, deformity or signs of injury. Right lower leg: No edema. Left lower leg: No edema. Skin:     General: Skin is warm and dry. Capillary Refill: Capillary refill takes less than 2 seconds. Findings: No lesion or rash. Neurological:      General: No focal deficit present. Mental Status: She is alert and oriented to person, place, and time. Mental status is at baseline. Cranial Nerves: No cranial nerve deficit. Psychiatric:         Mood and Affect: Mood normal.         Behavior: Behavior normal.         Thought Content:  Thought content normal.         Judgment: Judgment normal.         Diagnostic Study Results     Labs -     Recent Results (from the past 12 hour(s))   CBC WITH AUTOMATED DIFF    Collection Time: 11/22/20  4:45 PM   Result Value Ref Range    WBC 7.3 3.6 - 11.0 K/uL    RBC 4.28 3.80 - 5.20 M/uL    HGB 12.7 11.5 - 16.0 g/dL    HCT 39.2 35.0 - 47.0 %    MCV 91.6 80.0 - 99.0 FL    MCH 29.7 26.0 - 34.0 PG    MCHC 32.4 30.0 - 36.5 g/dL    RDW 13.9 11.5 - 14.5 %    PLATELET 921 983 - 673 K/uL    MPV 9.4 8.9 - 12.9 FL    NEUTROPHILS 72 32 - 75 %    LYMPHOCYTES 18 12 - 49 %    MONOCYTES 8 5 - 13 %    EOSINOPHILS 1 0 - 7 %    BASOPHILS 0 0 - 1 %    IMMATURE GRANULOCYTES 1 (H) 0.0 - 0.5 %    ABS. NEUTROPHILS 5.3 1.8 - 8.0 K/UL    ABS. LYMPHOCYTES 1.3 0.8 - 3.5 K/UL    ABS. MONOCYTES 0.6 0.0 - 1.0 K/UL    ABS. EOSINOPHILS 0.0 0.0 - 0.4 K/UL    ABS. BASOPHILS 0.0 0.0 - 0.1 K/UL    ABS. IMM. GRANS. 0.0 0.00 - 0.04 K/UL    DF AUTOMATED     METABOLIC PANEL, COMPREHENSIVE    Collection Time: 11/22/20  4:45 PM   Result Value Ref Range    Sodium 140 136 - 145 mmol/L    Potassium 3.3 (L) 3.5 - 5.1 mmol/L    Chloride 99 97 - 108 mmol/L    CO2 34 (H) 21 - 32 mmol/L    Anion gap 7 5 - 15 mmol/L    Glucose 207 (H) 65 - 100 mg/dL    BUN 13 6 - 20 mg/dL    Creatinine 0.86 0.55 - 1.02 mg/dL    BUN/Creatinine ratio 15 12 - 20      GFR est AA >60 >60 ml/min/1.73m2    GFR est non-AA >60 >60 ml/min/1.73m2    Calcium 9.6 8.5 - 10.1 mg/dL    Bilirubin, total 0.3 0.2 - 1.0 mg/dL    AST (SGOT) 34 15 - 37 U/L    ALT (SGPT) 46 12 - 78 U/L    Alk.  phosphatase 85 45 - 117 U/L    Protein, total 8.5 (H) 6.4 - 8.2 g/dL    Albumin 3.2 (L) 3.5 - 5.0 g/dL    Globulin 5.3 (H) 2.0 - 4.0 g/dL    A-G Ratio 0.6 (L) 1.1 - 2.2     TROPONIN I    Collection Time: 11/22/20  4:45 PM   Result Value Ref Range    Troponin-I, Qt. <0.05 <0.05 ng/mL   BNP    Collection Time: 11/22/20  4:45 PM   Result Value Ref Range    NT pro-BNP 57 <125 pg/mL   PROTHROMBIN TIME + INR    Collection Time: 11/22/20  4:45 PM   Result Value Ref Range    Prothrombin time 13.1 11.9 - 14.7 sec    INR 1.0 0.9 - 1.1     PTT    Collection Time: 11/22/20  4:45 PM   Result Value Ref Range    aPTT 25.7 23.0 - 35.7 sec    aPTT, therapeutic range   68 - 109 sec   URINALYSIS W/ REFLEX CULTURE    Collection Time: 11/22/20  4:45 PM    Specimen: Urine   Result Value Ref Range    Color Yellow/Straw      Appearance Clear Clear      Specific gravity 1.010 1.003 - 1.030      pH (UA) 6.0 5.0 - 8.0      Protein 30 (A) Negative mg/dL    Glucose Negative Negative mg/dL    Ketone Negative Negative mg/dL    Bilirubin Negative Negative      Blood Small (A) Negative      Urobilinogen 0.1 0.1 - 1.0 EU/dL    Nitrites Negative Negative      Leukocyte Esterase Trace (A) Negative      UA:UC IF INDICATED Culture not indicated by UA result Culture not indicated by UA result      WBC 0-4 0 - 4 /hpf    RBC 10-20 0 - 5 /hpf    Bacteria Negative Negative /hpf    Mucus Trace /lpf   LACTIC ACID    Collection Time: 11/22/20  4:45 PM   Result Value Ref Range    Lactic acid 2.3 (HH) 0.4 - 2.0 mmol/L   TSH 3RD GENERATION    Collection Time: 11/22/20  4:45 PM   Result Value Ref Range    TSH 0.85 0.36 - 3.74 uIU/mL   MAGNESIUM    Collection Time: 11/22/20  4:45 PM   Result Value Ref Range    Magnesium 1.5 (L) 1.6 - 2.4 mg/dL       Radiologic Studies -   XR CHEST SNGL V   Final Result        CT Results  (Last 48 hours)    None        CXR Results  (Last 48 hours)               11/22/20 1722  XR CHEST SNGL V Final result    Narrative:  Chest single view. No prior comparison study available in [facility] PACS       Hazy reticular markings through the mid and lower lungs. Findings may be related   to mild degree interstitial edema cardiogenic cause and/or   infectious/inflammatory process. Cardiac and mediastinal structures magnified on   this AP view. No pneumothorax or sizable pleural effusion.              Medical Decision Making and ED Course   I am the first provider for this patient. I reviewed the vital signs, available nursing notes, past medical history, past surgical history, family history and social history. Vital Signs-Reviewed the patient's vital signs. Patient Vitals for the past 12 hrs:   Temp Pulse Resp BP SpO2   11/22/20 1634 99.1 °F (37.3 °C) 94 22 (!) 182/96    11/22/20 1627     (!) 88 %       EKG interpretation:         Records Reviewed: Previous Hospital chart. EMS run report      ED Course:   Initial assessment performed. The patients presenting problems have been discussed, and they are in agreement with the care plan formulated and outlined with them. I have encouraged them to ask questions as they arise throughout their visit.     Orders Placed This Encounter    XR CHEST SNGL V     Standing Status:   Standing     Number of Occurrences:   1     Order Specific Question:   Transport     Answer:   Ambulatory [1]     Order Specific Question:   Reason for Exam     Answer:   sob    CBC WITH AUTOMATED DIFF     Standing Status:   Standing     Number of Occurrences:   1    METABOLIC PANEL, COMPREHENSIVE     Standing Status:   Standing     Number of Occurrences:   1    TROPONIN I     Standing Status:   Standing     Number of Occurrences:   1    BNP     Standing Status:   Standing     Number of Occurrences:   1    PROTHROMBIN TIME + INR     Standing Status:   Standing     Number of Occurrences:   1    PTT     Standing Status:   Standing     Number of Occurrences:   1    URINALYSIS W/ REFLEX CULTURE     Standing Status:   Standing     Number of Occurrences:   1    PROCALCITONIN     Standing Status:   Standing     Number of Occurrences:   1    LACTIC ACID     Standing Status:   Standing     Number of Occurrences:   1    TSH 3RD GENERATION     Standing Status:   Standing     Number of Occurrences:   1    MAGNESIUM     Standing Status:   Standing     Number of Occurrences:   1    Droplet Plus     Standing Status:   Standing     Number of Occurrences:   1    EKG, 12 LEAD, INITIAL     Standing Status:   Standing     Number of Occurrences:   1     Order Specific Question:   Reason for Exam:     Answer:   sob    sodium chloride 0.9 % bolus infusion 1,000 mL    cefTRIAXone (ROCEPHIN) 1 g in 0.9% sodium chloride (MBP/ADV) 50 mL MBP     Order Specific Question:   Antibiotic Indications     Answer:   Upper Respiratory Infection    azithromycin (ZITHROMAX) 500 mg in 0.9% sodium chloride 250 mL (VIAL-MATE)     Order Specific Question:   Antibiotic Indications     Answer:   Upper Respiratory Infection    dexamethasone (PF) (DECADRON) 10 mg/mL injection 10 mg    potassium chloride (KLOR-CON) packet for solution 40 mEq    magnesium sulfate 1 g/100 ml IVPB (premix or compounded)         ED Course as of Nov 22 1908   Sun Nov 22, 2020 1907 Discussed the case with hospitalist Dr. Rita Ba who will gladly come see the patient for evaluation and admission. [HP]      ED Course User Index  [HP] Nicolas French MD       CONSULTANTS:  Consults      Provider Notes (Medical Decision Making):   65year-old with shortness of breath. Differential includes pneumonia, Covid, pleural effusion, ACS. Patient does have known Covid. She is now hypoxic requiring admission. Improving on nasal cannula 2 L. Procedures           CRITICAL CARE NOTE :  6:58 PM  Amount of Critical Care Time: 45 minutes    IMPENDING DETERIORATION -Airway, Respiratory and Cardiovascular  ASSOCIATED RISK FACTORS - Hypotension  MANAGEMENT- Bedside Assessment and Supervision of Care  INTERPRETATION -  Xrays and Blood Gases  INTERVENTIONS - hemodynamic mngmt  CASE REVIEW - Hospitalist  TREATMENT RESPONSE -Improved  PERFORMED BY - Self    NOTES   :  I have spent critical care time involved in lab review, consultations with specialist, family decision- making, bedside attention and documentation. This time excludes time spent in any separate billed procedures.   During this entire length of time I was immediately available to the patient . Ree Moctezuma MD                Disposition       Emergency Department Disposition:  Admitted      Diagnosis     Clinical Impression:   1. COVID-19 virus infection    2. Hypomagnesemia    3. Hypokalemia    4. Hypoxia        Attestations:    Ree Moctezuma MD    Please note that this dictation was completed with [employer], the computer voice recognition software. Quite often unanticipated grammatical, syntax, homophones, and other interpretive errors are inadvertently transcribed by the computer software. Please disregard these errors. Please excuse any errors that have escaped final proofreading. Thank you.

## 2020-11-23 ENCOUNTER — APPOINTMENT (OUTPATIENT)
Dept: CT IMAGING | Age: 70
DRG: 177 | End: 2020-11-23
Attending: INTERNAL MEDICINE
Payer: MEDICARE

## 2020-11-23 LAB
ALBUMIN SERPL-MCNC: 2.8 G/DL (ref 3.5–5)
ALBUMIN/GLOB SERPL: 0.5 {RATIO} (ref 1.1–2.2)
ALP SERPL-CCNC: 78 U/L (ref 45–117)
ALT SERPL-CCNC: 40 U/L (ref 12–78)
ANION GAP SERPL CALC-SCNC: 4 MMOL/L (ref 5–15)
AST SERPL W P-5'-P-CCNC: 22 U/L (ref 15–37)
BILIRUB SERPL-MCNC: 0.2 MG/DL (ref 0.2–1)
BUN SERPL-MCNC: 12 MG/DL (ref 6–20)
BUN/CREAT SERPL: 16 (ref 12–20)
CA-I BLD-MCNC: 8.8 MG/DL (ref 8.5–10.1)
CHLORIDE SERPL-SCNC: 102 MMOL/L (ref 97–108)
CO2 SERPL-SCNC: 34 MMOL/L (ref 21–32)
COVID-19 RAPID TEST, COVR: POSITIVE
CREAT SERPL-MCNC: 0.73 MG/DL (ref 0.55–1.02)
CRP SERPL-MCNC: 10.2 MG/DL (ref 0–0.6)
ERYTHROCYTE [DISTWIDTH] IN BLOOD BY AUTOMATED COUNT: 13.9 % (ref 11.5–14.5)
GLOBULIN SER CALC-MCNC: 5.1 G/DL (ref 2–4)
GLUCOSE BLD STRIP.AUTO-MCNC: 264 MG/DL (ref 65–100)
GLUCOSE BLD STRIP.AUTO-MCNC: 352 MG/DL (ref 65–100)
GLUCOSE SERPL-MCNC: 246 MG/DL (ref 65–100)
HCT VFR BLD AUTO: 36 % (ref 35–47)
HGB BLD-MCNC: 11.5 G/DL (ref 11.5–16)
LDH SERPL L TO P-CCNC: 340 U/L (ref 81–246)
MAGNESIUM SERPL-MCNC: 1.7 MG/DL (ref 1.6–2.4)
MCH RBC QN AUTO: 29.5 PG (ref 26–34)
MCHC RBC AUTO-ENTMCNC: 31.9 G/DL (ref 30–36.5)
MCV RBC AUTO: 92.3 FL (ref 80–99)
PERFORMED BY, TECHID: ABNORMAL
PERFORMED BY, TECHID: ABNORMAL
PLATELET # BLD AUTO: 409 K/UL (ref 150–400)
PMV BLD AUTO: 9.6 FL (ref 8.9–12.9)
POTASSIUM SERPL-SCNC: 4.2 MMOL/L (ref 3.5–5.1)
PROT SERPL-MCNC: 7.9 G/DL (ref 6.4–8.2)
RBC # BLD AUTO: 3.9 M/UL (ref 3.8–5.2)
SARS-COV-2, COV2: NORMAL
SODIUM SERPL-SCNC: 140 MMOL/L (ref 136–145)
SPECIMEN SOURCE: ABNORMAL
WBC # BLD AUTO: 6.1 K/UL (ref 3.6–11)

## 2020-11-23 PROCEDURE — 65270000029 HC RM PRIVATE

## 2020-11-23 PROCEDURE — 74011250637 HC RX REV CODE- 250/637: Performed by: FAMILY MEDICINE

## 2020-11-23 PROCEDURE — 99223 1ST HOSP IP/OBS HIGH 75: CPT | Performed by: INTERNAL MEDICINE

## 2020-11-23 PROCEDURE — 80053 COMPREHEN METABOLIC PANEL: CPT

## 2020-11-23 PROCEDURE — 87635 SARS-COV-2 COVID-19 AMP PRB: CPT

## 2020-11-23 PROCEDURE — 74011000258 HC RX REV CODE- 258: Performed by: FAMILY MEDICINE

## 2020-11-23 PROCEDURE — 94762 N-INVAS EAR/PLS OXIMTRY CONT: CPT

## 2020-11-23 PROCEDURE — 77010033678 HC OXYGEN DAILY

## 2020-11-23 PROCEDURE — 86140 C-REACTIVE PROTEIN: CPT

## 2020-11-23 PROCEDURE — 82728 ASSAY OF FERRITIN: CPT

## 2020-11-23 PROCEDURE — 83615 LACTATE (LD) (LDH) ENZYME: CPT

## 2020-11-23 PROCEDURE — 36415 COLL VENOUS BLD VENIPUNCTURE: CPT

## 2020-11-23 PROCEDURE — 82962 GLUCOSE BLOOD TEST: CPT

## 2020-11-23 PROCEDURE — 74011250636 HC RX REV CODE- 250/636: Performed by: FAMILY MEDICINE

## 2020-11-23 PROCEDURE — 85027 COMPLETE CBC AUTOMATED: CPT

## 2020-11-23 PROCEDURE — 74011000250 HC RX REV CODE- 250: Performed by: FAMILY MEDICINE

## 2020-11-23 PROCEDURE — 83721 ASSAY OF BLOOD LIPOPROTEIN: CPT

## 2020-11-23 PROCEDURE — 74011636637 HC RX REV CODE- 636/637: Performed by: FAMILY MEDICINE

## 2020-11-23 PROCEDURE — 74011250637 HC RX REV CODE- 250/637: Performed by: NURSE PRACTITIONER

## 2020-11-23 PROCEDURE — 83735 ASSAY OF MAGNESIUM: CPT

## 2020-11-23 RX ORDER — DORZOLAMIDE HCL 20 MG/ML
1 SOLUTION/ DROPS OPHTHALMIC 3 TIMES DAILY
Status: DISCONTINUED | OUTPATIENT
Start: 2020-11-23 | End: 2020-11-23

## 2020-11-23 RX ORDER — LATANOPROST 50 UG/ML
1 SOLUTION/ DROPS OPHTHALMIC EVERY EVENING
Status: DISCONTINUED | OUTPATIENT
Start: 2020-11-24 | End: 2020-11-24

## 2020-11-23 RX ORDER — TIMOLOL MALEATE 5 MG/ML
1 SOLUTION/ DROPS OPHTHALMIC 2 TIMES DAILY
Status: DISCONTINUED | OUTPATIENT
Start: 2020-11-23 | End: 2020-11-23

## 2020-11-23 RX ORDER — BRIMONIDINE TARTRATE 2 MG/ML
1 SOLUTION/ DROPS OPHTHALMIC EVERY 8 HOURS
Status: DISCONTINUED | OUTPATIENT
Start: 2020-11-23 | End: 2020-11-23

## 2020-11-23 RX ORDER — LANOLIN ALCOHOL/MO/W.PET/CERES
3 CREAM (GRAM) TOPICAL
Status: DISCONTINUED | OUTPATIENT
Start: 2020-11-23 | End: 2020-11-27 | Stop reason: HOSPADM

## 2020-11-23 RX ADMIN — SODIUM CHLORIDE 100 MG: 9 INJECTION, SOLUTION INTRAVENOUS at 21:42

## 2020-11-23 RX ADMIN — MELATONIN TAB 3 MG 3 MG: 3 TAB at 21:42

## 2020-11-23 RX ADMIN — CLONIDINE HYDROCHLORIDE 0.2 MG: 0.1 TABLET ORAL at 09:20

## 2020-11-23 RX ADMIN — BENZONATATE 100 MG: 100 CAPSULE ORAL at 21:43

## 2020-11-23 RX ADMIN — LISINOPRIL 20 MG: 20 TABLET ORAL at 09:20

## 2020-11-23 RX ADMIN — AZITHROMYCIN 500 MG: 500 INJECTION, POWDER, LYOPHILIZED, FOR SOLUTION INTRAVENOUS at 17:57

## 2020-11-23 RX ADMIN — CLONIDINE HYDROCHLORIDE 0.2 MG: 0.1 TABLET ORAL at 21:42

## 2020-11-23 RX ADMIN — Medication 10 ML: at 15:51

## 2020-11-23 RX ADMIN — INSULIN LISPRO 10 UNITS: 100 INJECTION, SOLUTION INTRAVENOUS; SUBCUTANEOUS at 21:43

## 2020-11-23 RX ADMIN — INSULIN LISPRO 6 UNITS: 100 INJECTION, SOLUTION INTRAVENOUS; SUBCUTANEOUS at 11:56

## 2020-11-23 RX ADMIN — DEXAMETHASONE SODIUM PHOSPHATE 6 MG: 4 INJECTION, SOLUTION INTRAMUSCULAR; INTRAVENOUS at 09:20

## 2020-11-23 RX ADMIN — Medication 10 ML: at 21:52

## 2020-11-23 RX ADMIN — ATENOLOL 50 MG: 50 TABLET ORAL at 09:20

## 2020-11-23 RX ADMIN — AMLODIPINE BESYLATE 10 MG: 5 TABLET ORAL at 09:20

## 2020-11-23 RX ADMIN — ACETAMINOPHEN 650 MG: 325 TABLET, FILM COATED ORAL at 21:42

## 2020-11-23 RX ADMIN — ENOXAPARIN SODIUM 40 MG: 40 INJECTION SUBCUTANEOUS at 09:20

## 2020-11-23 RX ADMIN — CEFTRIAXONE 1 G: 1 INJECTION, POWDER, FOR SOLUTION INTRAMUSCULAR; INTRAVENOUS at 17:58

## 2020-11-23 RX ADMIN — ATORVASTATIN CALCIUM 10 MG: 10 TABLET, FILM COATED ORAL at 21:42

## 2020-11-23 NOTE — ED NOTES
.. TRANSFER - OUT REPORT:    Verbal report given to Drea on Khushboo Limon  being transferred to [facility](unit) for routine progression of care       Report consisted of patients Situation, Background, Assessment and   Recommendations(SBAR). Information from the following report(s) SBAR was reviewed with the receiving nurse. Lines:   Peripheral IV 11/22/20 Right Forearm (Active)        Opportunity for questions and clarification was provided.       Patient transported with:   O2 @ 4 liters

## 2020-11-23 NOTE — PROGRESS NOTES
Hospitalist Progress Note         NANCY Campbell, FNP-C    Daily Progress Note: 11/23/2020    Basil Menon is a 65 y.o. female with past medical history of asthma, non-insulin-dependent diabetes mellitus type 2, hypertension and hyperlipidemia presenting to the ER with complaints of shortness of breath, cough, lightheadedness, weakness, dizziness, chills, fatigue and nasal congestion. Ms. Jarett Simms was found positive for COVID-19 on November 17, 2020. Since diagnosis, symptoms have progressively worsened over the past several days. Ms. Jarett Simms has not used her albuterol inhaler for symptoms. Ms. Jarett Simms was prescribed azithromycin on November 17th and has taken as prescribed. She presents this evening for further treatment and evaluation.     On arrival to the ER, temperature was 99.1 °F, pulse 94, respirations 22, blood pressure 182/96 and oxygen saturation was 88% on room air. Initial chest x-ray is positive for hazy reticular markings throughout the mid and lower lungs in bilateral lung fields. White blood cell count of 7.3, lactic acid 2.3 and potassium is 3.3. Ms. Jarett Simms was given 1 L of normal saline and started on IV ceftriaxone and azithromycin. She was given 1 dose of IV Decadron. Potassium and magnesium supplements were administered in the ER. Hospital service has been asked to admit Ms. Jarett Simms for further treatment and evaluation of sepsis, acute hypoxic respiratory failure and bilateral pneumonia secondary to COVID-19.     Past medical history, past surgical history, family history, social history and home medication list was reviewed at the time of admission. Ms. Jaertt Simms is a full code. , Jennifer Weinstein, can be reached at [phone]. I called and updated  on admission this evening.      Subjective:   Subjective   Patient seen on f/u alert and oriented laying in bed  Report sob and difficulty sleeping  No acute distress noted    Review of Systems:   Review of Systems   Constitutional: Negative. HENT: Negative. Eyes: Negative. Negative for blurred vision, double vision and photophobia. Respiratory: Positive for cough and shortness of breath. Cardiovascular: Negative. Gastrointestinal: Negative. Negative for heartburn, nausea and vomiting. Genitourinary: Negative. Musculoskeletal: Negative. Skin: Negative. Neurological: Negative. Negative for dizziness, tingling and headaches. Endo/Heme/Allergies: Negative. Psychiatric/Behavioral: The patient has insomnia. Objective:   Objective      Vitals:  Patient Vitals for the past 12 hrs:   Temp Pulse Resp BP SpO2   11/23/20 0800  80      11/23/20 0344 98 °F (36.7 °C) 79 30 (!) 128/111 98 %   11/23/20 0144  78 (!) 32 (!) 153/120 98 %   11/23/20 0014  77 (!) 32 138/80 99 %   11/22/20 2149  94 20 137/81 96 %        Physical Exam:  Physical Exam  Vitals signs and nursing note reviewed. Constitutional:       Appearance: She is obese. HENT:      Head: Normocephalic. Mouth/Throat:      Mouth: Mucous membranes are moist.   Eyes:      Extraocular Movements: Extraocular movements intact. Cardiovascular:      Rate and Rhythm: Normal rate and regular rhythm. Pulses: Normal pulses. Heart sounds: Normal heart sounds. Pulmonary:      Breath sounds: Rhonchi present. Comments: 4L NC, scattered rhonchi  Abdominal:      General: Bowel sounds are normal.      Palpations: Abdomen is soft. Musculoskeletal: Normal range of motion. Skin:     General: Skin is warm and dry. Capillary Refill: Capillary refill takes less than 2 seconds. Neurological:      General: No focal deficit present. Mental Status: She is alert and oriented to person, place, and time.    Psychiatric:         Mood and Affect: Mood normal.          Lab Results:  Recent Results (from the past 24 hour(s))   CBC WITH AUTOMATED DIFF    Collection Time: 11/22/20  4:45 PM   Result Value Ref Range    WBC 7.3 3.6 - 11.0 K/uL    RBC 4. 28 3.80 - 5.20 M/uL    HGB 12.7 11.5 - 16.0 g/dL    HCT 39.2 35.0 - 47.0 %    MCV 91.6 80.0 - 99.0 FL    MCH 29.7 26.0 - 34.0 PG    MCHC 32.4 30.0 - 36.5 g/dL    RDW 13.9 11.5 - 14.5 %    PLATELET 881 763 - 881 K/uL    MPV 9.4 8.9 - 12.9 FL    NEUTROPHILS 72 32 - 75 %    LYMPHOCYTES 18 12 - 49 %    MONOCYTES 8 5 - 13 %    EOSINOPHILS 1 0 - 7 %    BASOPHILS 0 0 - 1 %    IMMATURE GRANULOCYTES 1 (H) 0.0 - 0.5 %    ABS. NEUTROPHILS 5.3 1.8 - 8.0 K/UL    ABS. LYMPHOCYTES 1.3 0.8 - 3.5 K/UL    ABS. MONOCYTES 0.6 0.0 - 1.0 K/UL    ABS. EOSINOPHILS 0.0 0.0 - 0.4 K/UL    ABS. BASOPHILS 0.0 0.0 - 0.1 K/UL    ABS. IMM. GRANS. 0.0 0.00 - 0.04 K/UL    DF AUTOMATED     METABOLIC PANEL, COMPREHENSIVE    Collection Time: 11/22/20  4:45 PM   Result Value Ref Range    Sodium 140 136 - 145 mmol/L    Potassium 3.3 (L) 3.5 - 5.1 mmol/L    Chloride 99 97 - 108 mmol/L    CO2 34 (H) 21 - 32 mmol/L    Anion gap 7 5 - 15 mmol/L    Glucose 207 (H) 65 - 100 mg/dL    BUN 13 6 - 20 mg/dL    Creatinine 0.86 0.55 - 1.02 mg/dL    BUN/Creatinine ratio 15 12 - 20      GFR est AA >60 >60 ml/min/1.73m2    GFR est non-AA >60 >60 ml/min/1.73m2    Calcium 9.6 8.5 - 10.1 mg/dL    Bilirubin, total 0.3 0.2 - 1.0 mg/dL    AST (SGOT) 34 15 - 37 U/L    ALT (SGPT) 46 12 - 78 U/L    Alk.  phosphatase 85 45 - 117 U/L    Protein, total 8.5 (H) 6.4 - 8.2 g/dL    Albumin 3.2 (L) 3.5 - 5.0 g/dL    Globulin 5.3 (H) 2.0 - 4.0 g/dL    A-G Ratio 0.6 (L) 1.1 - 2.2     TROPONIN I    Collection Time: 11/22/20  4:45 PM   Result Value Ref Range    Troponin-I, Qt. <0.05 <0.05 ng/mL   BNP    Collection Time: 11/22/20  4:45 PM   Result Value Ref Range    NT pro-BNP 57 <125 pg/mL   PROTHROMBIN TIME + INR    Collection Time: 11/22/20  4:45 PM   Result Value Ref Range    Prothrombin time 13.1 11.9 - 14.7 sec    INR 1.0 0.9 - 1.1     PTT    Collection Time: 11/22/20  4:45 PM   Result Value Ref Range    aPTT 25.7 23.0 - 35.7 sec    aPTT, therapeutic range   68 - 109 sec   URINALYSIS W/ REFLEX CULTURE    Collection Time: 11/22/20  4:45 PM    Specimen: Urine   Result Value Ref Range    Color Yellow/Straw      Appearance Clear Clear      Specific gravity 1.010 1.003 - 1.030      pH (UA) 6.0 5.0 - 8.0      Protein 30 (A) Negative mg/dL    Glucose Negative Negative mg/dL    Ketone Negative Negative mg/dL    Bilirubin Negative Negative      Blood Small (A) Negative      Urobilinogen 0.1 0.1 - 1.0 EU/dL    Nitrites Negative Negative      Leukocyte Esterase Trace (A) Negative      UA:UC IF INDICATED Culture not indicated by UA result Culture not indicated by UA result      WBC 0-4 0 - 4 /hpf    RBC 10-20 0 - 5 /hpf    Bacteria Negative Negative /hpf    Mucus Trace /lpf   PROCALCITONIN    Collection Time: 11/22/20  4:45 PM   Result Value Ref Range    Procalcitonin <0.05 (H) 0 ng/mL   LACTIC ACID    Collection Time: 11/22/20  4:45 PM   Result Value Ref Range    Lactic acid 2.3 (HH) 0.4 - 2.0 mmol/L   TSH 3RD GENERATION    Collection Time: 11/22/20  4:45 PM   Result Value Ref Range    TSH 0.85 0.36 - 3.74 uIU/mL   MAGNESIUM    Collection Time: 11/22/20  4:45 PM   Result Value Ref Range    Magnesium 1.5 (L) 1.6 - 2.4 mg/dL   LACTIC ACID    Collection Time: 11/22/20  8:21 PM   Result Value Ref Range    Lactic acid 1.8 0.4 - 2.0 mmol/L   PROCALCITONIN    Collection Time: 11/22/20  8:21 PM   Result Value Ref Range    Procalcitonin <0.05 (H) 0 ng/mL   GLUCOSE, POC    Collection Time: 11/22/20  8:41 PM   Result Value Ref Range    Glucose (POC) 214 (H) 65 - 100 mg/dL    Performed by Neelima SanFranSEO    SARS-COV-2    Collection Time: 11/23/20 12:47 AM   Result Value Ref Range    SARS-CoV-2 Please find results under separate order     COVID-19 RAPID TEST    Collection Time: 11/23/20 12:47 AM   Result Value Ref Range    Specimen source Nasopharyngeal      COVID-19 rapid test Positive (A) Not Detected            Diagnostic Images:  CT Results  (Last 48 hours)    None          Current Medications:    Current Facility-Administered Medications:     melatonin tablet 3 mg, 3 mg, Oral, QHS PRN, Annette Farris NP    benzonatate (TESSALON) capsule 100 mg, 100 mg, Oral, TID PRN, Amado Will MD    azithromycin (ZITHROMAX) 500 mg in 0.9% sodium chloride 250 mL (VIAL-MATE), 500 mg, IntraVENous, Q24H, Amado Will MD    cefTRIAXone (ROCEPHIN) 1 g in 0.9% sodium chloride (MBP/ADV) 50 mL MBP, 1 g, IntraVENous, Q24H, Daniel Quiles MD    albuterol (PROVENTIL HFA, VENTOLIN HFA, PROAIR HFA) inhaler 2 Puff, 2 Puff, Inhalation, Q4H PRN, Amado Will MD    insulin lispro (HUMALOG) injection, , SubCUTAneous, AC&HS, Amado Will MD, 4 Units at 11/22/20 2145    glucose chewable tablet 16 g, 4 Tab, Oral, PRN, Amado Will MD    glucagon (GLUCAGEN) injection 1 mg, 1 mg, IntraMUSCular, PRN, Amado Will MD    dextrose (D50W) injection syrg 12.5-25 g, 25-50 mL, IntraVENous, PRN, Amado Will MD    sodium chloride (NS) flush 5-40 mL, 5-40 mL, IntraVENous, Q8H, Amado Will MD    sodium chloride (NS) flush 5-40 mL, 5-40 mL, IntraVENous, PRN, Amado Will MD    acetaminophen (TYLENOL) tablet 650 mg, 650 mg, Oral, Q6H PRN **OR** acetaminophen (TYLENOL) suppository 650 mg, 650 mg, Rectal, Q6H PRN, Amado Will MD    polyethylene glycol (MIRALAX) packet 17 g, 17 g, Oral, DAILY PRN, Amado Will MD    promethazine (PHENERGAN) tablet 12.5 mg, 12.5 mg, Oral, Q6H PRN **OR** ondansetron (ZOFRAN) injection 4 mg, 4 mg, IntraVENous, Q6H PRN, Amado Will MD    enoxaparin (LOVENOX) injection 40 mg, 40 mg, SubCUTAneous, DAILY, Claudell Greening C, MD, 40 mg at 11/23/20 0920    dexamethasone (DECADRON) 4 mg/mL injection 6 mg, 6 mg, IntraVENous, DAILY, Claudell Greening C, MD, 6 mg at 11/23/20 0920    remdesivir 100 mg in 0.9% sodium chloride 250 mL IVPB, 100 mg, IntraVENous, Q24H, Amado Will MD    amLODIPine (NORVASC) tablet 10 mg, 10 mg, Oral, DAILY, Amado Will MD, 10 mg at 11/23/20 0920    atenoloL (TENORMIN) tablet 50 mg, 50 mg, Oral, DAILY, Sundeep Khoury MD, 50 mg at 11/23/20 0920    cloNIDine HCL (CATAPRES) tablet 0.2 mg, 0.2 mg, Oral, BID, Josie MANCILLA MD, 0.2 mg at 11/23/20 0920    lisinopriL (PRINIVIL, ZESTRIL) tablet 20 mg, 20 mg, Oral, DAILY, Sundeep Khoury MD, 20 mg at 11/23/20 0920    atorvastatin (LIPITOR) tablet 10 mg, 10 mg, Oral, QHS, Sundeep Khoury MD, 10 mg at 11/22/20 0717       ASSESSMENT:    Covid 19: tested positive recently outpatient. Continue IV ceftriaxone, azithromycin, decadron, Remdesivir. ID following, isolation precautions, prn albuterol inhaler. Currently on 4L NC supplemental O2 sats 92-98%, wean O2 as tolerated    Covid pneumonia: cxr shows lung consolidation. Continue above treatment plan    Acute respiratory failure with hypoxia: secondary to #1. Continue above treatment plan    Morbid obesity: patient counseled on dietary and lifestyle modifications    Hypertension: continue amlodipine, atenolol, clonidine, lisinopril    Type 2 DM: A1c 9.9, ac/hs accu check, med dose ssi        PLAN:  Continue IV ceftriaxone, azithromycin, decadron, Remdesivir, albuterol inh  Continue home antihypertensives  Patient educated on dietary and lifestyle modifications  Obtain crp, ldl, ferritin, ID input appreciated  ac/hs accu check, med dose ssi    Full Code  Lovenox Dvt Prophylaxis  GI Prophylaxis  Home medications reviewed and reconciled      Above treatment plan reviewed and discussed with patient in detail at bedside, all questions answered. Care Plan discussed with:  Interdisciplinary team        Kirstin Guajardo, JOE

## 2020-11-23 NOTE — H&P
History and Physical    Patient: Rupinder Canales MRN: [id number]  SSN: [id number]    YOB: 1950  Age: 65 y.o. Sex: female      Subjective:      Chief Complaint: Shortness of breath, cough, fatigue and weakness. HPI: Rupinder Canales is a 65 y.o. female with past medical history of asthma, non-insulin-dependent diabetes mellitus type 2, hypertension and hyperlipidemia presenting to the ER with complaints of shortness of breath, cough, lightheadedness, weakness, dizziness, chills, fatigue and nasal congestion. Ms. Teresa Ashley was found positive for COVID-19 on November 17, 2020. Since diagnosis, symptoms have progressively worsened over the past several days. Ms. Teresa Ashley has not used her albuterol inhaler for symptoms. Ms. Teresa Ashley was prescribed azithromycin on November 17th and has taken as prescribed. She presents this evening for further treatment and evaluation. On arrival to the ER, temperature was 99.1 °F, pulse 94, respirations 22, blood pressure 182/96 and oxygen saturation was 88% on room air. Initial chest x-ray is positive for hazy reticular markings throughout the mid and lower lungs in bilateral lung fields. White blood cell count of 7.3, lactic acid 2.3 and potassium is 3.3. Ms. Teresa Ashley was given 1 L of normal saline and started on IV ceftriaxone and azithromycin. She was given 1 dose of IV Decadron. Potassium and magnesium supplements were administered in the ER. Hospital service has been asked to admit Ms. Teresa Ashley for further treatment and evaluation of sepsis, acute hypoxic respiratory failure and bilateral pneumonia secondary to COVID-19. Past medical history, past surgical history, family history, social history and home medication list was reviewed at the time of admission. Ms. Teresa Ashley is a full code. , Tommie Sykes, can be reached at [phone]. I called and updated  on admission this evening.      Past Medical History:   Diagnosis Date    Acute sinusitis 11/13/2020    Arthritis     Cyst of ovary 11/13/2020    Essential (primary) hypertension 9/26/2017    Glaucoma 11/30/2018    Gout 8/9/2019    Hypercholesterolemia 11/13/2020    Impacted cerumen 11/13/2020    Mild intermittent asthma, uncomplicated 5/71/5388    Mixed hyperlipidemia due to type 2 diabetes mellitus ([facility] [address] 9/26/2017    Painless rectal bleeding 11/30/2018    Type 2 diabetes mellitus without complications ([facility] [address] 1/49/3164     Past Surgical History:   Procedure Laterality Date    HX COLONOSCOPY      HX HERNIA REPAIR  Unknown date    HX KNEE ARTHROSCOPY Right 01/01/2013    HX OTHER SURGICAL  05/16/2017    Eye surgery procedure    HX PARTIAL HYSTERECTOMY  Unknown date      Family History   Problem Relation Age of Onset    Arthritis-osteo Mother     Hypertension Other     Heart Disease Other     Diabetes Other      Social History     Tobacco Use    Smoking status: Former Smoker     Types: Cigarettes    Smokeless tobacco: Never Used    Tobacco comment: At least 30 years ago. Substance Use Topics    Alcohol use: Never     Frequency: Never      Prior to Admission medications    Medication Sig Start Date End Date Taking? Authorizing Provider   amLODIPine (NORVASC) 10 mg tablet TAKE 1 TABLET EVERY DAY 11/20/20   Marlon Goodwin MD   benzonatate (TESSALON) 100 mg capsule Take 1 Cap by mouth three (3) times daily as needed for Cough for up to 7 days. 11/19/20 11/26/20  Don Davis NP   albuterol (PROVENTIL HFA, VENTOLIN HFA, PROAIR HFA) 90 mcg/actuation inhaler Take 1 Puff by inhalation every four (4) hours as needed for Wheezing. 11/17/20   Don Davis NP   azithromycin (ZITHROMAX) 250 mg tablet Take 2 tablets today, then take 1 tablet daily x 4 days 11/17/20   Don Davis NP   albuterol (PROVENTIL VENTOLIN) 2.5 mg /3 mL (0.083 %) nebu by Nebulization route.     Provider, Historical   allopurinoL (ZYLOPRIM) 100 mg tablet TAKE 1 TABLET EVERY DAY 10/13/20   Marlon Goodwin MD   pravastatin (PRAVACHOL) 40 mg tablet TAKE 1 TABLET EVERY DAY 9/2/20   Moshe Goltz, MD   hydroCHLOROthiazide (HYDRODIURIL) 25 mg tablet TAKE 1 TABLET EVERY DAY 9/2/20   Moshe Goltz, MD   cloNIDine HCL (CATAPRES) 0.2 mg tablet TAKE 1 TABLET TWICE DAILY 9/2/20   Moshe Goltz, MD   metFORMIN (GLUCOPHAGE) 1,000 mg tablet TAKE 1/2 TABLET TWICE A DAY 9/2/20   Moshe Goltz, MD   lisinopriL (PRINIVIL, ZESTRIL) 20 mg tablet TAKE 1 TABLET EVERY DAY 9/2/20   Moshe Goltz, MD   glipiZIDE (GLUCOTROL) 5 mg tablet Take 2 Tabs by mouth two (2) times a day for 180 days. Take 2 TABS by mouth twice daily. 8/10/20 2/6/21  Moshe Goltz, MD   atenoloL (TENORMIN) 50 mg tablet Take 1 Tab by mouth daily. Indications: high blood pressure 8/10/20   Moshe Goltz, MD   diclofenac (VOLTAREN) 1 % gel Apply 2 g to affected area four (4) times daily. As needed for arthritis pain 8/10/20   Moshe Goltz, MD   colchicine (Colcrys) 0.6 mg tablet Take 0.6 mg by mouth two (2) times a day. Provider, Historical   True Metrix Glucose Test Strip strip USE TO CHECK BLOOD SUGAR DAILY    Provider, Historical   TRUEplus Lancets 28 gauge misc USE TO CHECK BLOOD SUGAR DAILY    Provider, Historical        Allergies   Allergen Reactions    Aspirin Shortness of Breath     Respiratory distress    Codeine Vertigo       Review of Systems:  Constitutional: Positive for fatigue, chills and generalized weakness. Denies fevers, unexplained weight loss, night sweats. Head, Eyes, Ears, Nose, Mouth, Throat: Positive for nasal congestion and sore throat. Denies rhinorrhea, earache, ringing of the ears, difficulty hearing, facial pain, facial swelling. Respiratory: Positive for shortness of breath, cough. Denies wheezing, sputum production, hemoptysis. Denies use of oxygen at home. Cardiovascular: Denies chest pain, irregular heart beat, racing pulse, lower extremity edema, dizziness, dyspnea on exertion, orthopnea. No lower extremity edema. Gastrointestinal: Denies nausea, vomiting, diarrhea, constipation, abdominal pain, loss of appetite, acid reflux, melena, hematochezia, change in bowel habits. Endocrine: Denies intolerance to heat or cold. Denies polyuria, polydipsia, polyphagia. Denies recent weight changes. Genitourinary: Denies increased urinary frequency, dysuria, hematuria, urinary incontinence, increased urinary frequency. Integument/Breast: Denies rash, itching or new skin lesions. Musculoskeletal: Denies joint swelling, joint pain, myalgias, neck pain, back pain. Neurological: Positive for dizziness and lightheadedness. Denies headaches, confusion, tremors, numbness/tingling, paresthesias, weakness, problems with balance, loss of consciousness. Hematologic: Denies easy bleeding, easy bruising, lymphadenopathy. Behavioral/Psychiatric: Denies anxiety, depression, increased irritability, mood swings, delusions, hallucination, SI/HI. Objective:     Vitals:    11/22/20 1627 11/22/20 1634 11/22/20 2012   BP:  (!) 182/96 (!) 149/82   Pulse:  94 87   Resp:  22 (!) 33   Temp:  99.1 °F (37.3 °C)    SpO2: (!) 88%  98%   Weight: 104.3 kg (230 lb)     Height: 5' 6\" (1.676 m)          Physical Exam:  General: Alert and Oriented x 3. Cooperative and friendly. Appears ill but not toxic. No acute distress. Nourished and well developed. Patient is on oxygen supplementation via nasal cannula. HEAD, EYES: Normocephalic, atraumatic, EOMI, PERRLA. Nose: Turbinates are boggy with swelling, No drainage. Throat and Neck: Posterior pharynx was not assessed at the time of admission. No masses, JVD, thyromegaly or lymphadenopathy appreciated. Cervical spine has good range of motion without pain. Lungs: Decreased breath sounds in lower lung fields without rhonchi, wheezing or crackles. Symmetric chest rise with respirations. Patient can speak sentences easily and without difficulty. Heart: Regular rate and rhythm. Normal S1/S2.  No appreciated murmurs, rubs or gallops. No lower extremity edema. Abdomen: Soft, non-tender, non-distended. Bowel sounds present in all four quadrants. No masses or hepatosplenomegaly appreciated. Extremities:  Atraumatic. Able to move all extremities symmetrically. No abnormal bony protuberances appreciated. Joints without swelling. Back: No pain with palpation over spinous processes or paraspinal musculature. No CVA tenderness. Skin: Clean, dry and intact without appreciated lesions. Neurologic: A&Ox3. Cranial nerves 2-12 are grossly intact. Intact sensation and motor strength in all 4 extremities. Facial features are symmetric. Speech is fluent and clear. No focal deficits appreciated. Psychiatric: Normal affect, normal thought process, good eye contact. Recent Results (from the past 24 hour(s))   CBC WITH AUTOMATED DIFF    Collection Time: 11/22/20  4:45 PM   Result Value Ref Range    WBC 7.3 3.6 - 11.0 K/uL    RBC 4.28 3.80 - 5.20 M/uL    HGB 12.7 11.5 - 16.0 g/dL    HCT 39.2 35.0 - 47.0 %    MCV 91.6 80.0 - 99.0 FL    MCH 29.7 26.0 - 34.0 PG    MCHC 32.4 30.0 - 36.5 g/dL    RDW 13.9 11.5 - 14.5 %    PLATELET 665 879 - 197 K/uL    MPV 9.4 8.9 - 12.9 FL    NEUTROPHILS 72 32 - 75 %    LYMPHOCYTES 18 12 - 49 %    MONOCYTES 8 5 - 13 %    EOSINOPHILS 1 0 - 7 %    BASOPHILS 0 0 - 1 %    IMMATURE GRANULOCYTES 1 (H) 0.0 - 0.5 %    ABS. NEUTROPHILS 5.3 1.8 - 8.0 K/UL    ABS. LYMPHOCYTES 1.3 0.8 - 3.5 K/UL    ABS. MONOCYTES 0.6 0.0 - 1.0 K/UL    ABS. EOSINOPHILS 0.0 0.0 - 0.4 K/UL    ABS. BASOPHILS 0.0 0.0 - 0.1 K/UL    ABS. IMM.  GRANS. 0.0 0.00 - 0.04 K/UL    DF AUTOMATED     METABOLIC PANEL, COMPREHENSIVE    Collection Time: 11/22/20  4:45 PM   Result Value Ref Range    Sodium 140 136 - 145 mmol/L    Potassium 3.3 (L) 3.5 - 5.1 mmol/L    Chloride 99 97 - 108 mmol/L    CO2 34 (H) 21 - 32 mmol/L    Anion gap 7 5 - 15 mmol/L    Glucose 207 (H) 65 - 100 mg/dL    BUN 13 6 - 20 mg/dL    Creatinine 0.86 0.55 - 1.02 mg/dL BUN/Creatinine ratio 15 12 - 20      GFR est AA >60 >60 ml/min/1.73m2    GFR est non-AA >60 >60 ml/min/1.73m2    Calcium 9.6 8.5 - 10.1 mg/dL    Bilirubin, total 0.3 0.2 - 1.0 mg/dL    AST (SGOT) 34 15 - 37 U/L    ALT (SGPT) 46 12 - 78 U/L    Alk.  phosphatase 85 45 - 117 U/L    Protein, total 8.5 (H) 6.4 - 8.2 g/dL    Albumin 3.2 (L) 3.5 - 5.0 g/dL    Globulin 5.3 (H) 2.0 - 4.0 g/dL    A-G Ratio 0.6 (L) 1.1 - 2.2     TROPONIN I    Collection Time: 11/22/20  4:45 PM   Result Value Ref Range    Troponin-I, Qt. <0.05 <0.05 ng/mL   BNP    Collection Time: 11/22/20  4:45 PM   Result Value Ref Range    NT pro-BNP 57 <125 pg/mL   PROTHROMBIN TIME + INR    Collection Time: 11/22/20  4:45 PM   Result Value Ref Range    Prothrombin time 13.1 11.9 - 14.7 sec    INR 1.0 0.9 - 1.1     PTT    Collection Time: 11/22/20  4:45 PM   Result Value Ref Range    aPTT 25.7 23.0 - 35.7 sec    aPTT, therapeutic range   68 - 109 sec   URINALYSIS W/ REFLEX CULTURE    Collection Time: 11/22/20  4:45 PM    Specimen: Urine   Result Value Ref Range    Color Yellow/Straw      Appearance Clear Clear      Specific gravity 1.010 1.003 - 1.030      pH (UA) 6.0 5.0 - 8.0      Protein 30 (A) Negative mg/dL    Glucose Negative Negative mg/dL    Ketone Negative Negative mg/dL    Bilirubin Negative Negative      Blood Small (A) Negative      Urobilinogen 0.1 0.1 - 1.0 EU/dL    Nitrites Negative Negative      Leukocyte Esterase Trace (A) Negative      UA:UC IF INDICATED Culture not indicated by UA result Culture not indicated by UA result      WBC 0-4 0 - 4 /hpf    RBC 10-20 0 - 5 /hpf    Bacteria Negative Negative /hpf    Mucus Trace /lpf   LACTIC ACID    Collection Time: 11/22/20  4:45 PM   Result Value Ref Range    Lactic acid 2.3 (HH) 0.4 - 2.0 mmol/L   TSH 3RD GENERATION    Collection Time: 11/22/20  4:45 PM   Result Value Ref Range    TSH 0.85 0.36 - 3.74 uIU/mL   MAGNESIUM    Collection Time: 11/22/20  4:45 PM   Result Value Ref Range    Magnesium 1.5 (L) 1.6 - 2.4 mg/dL       XR Results (maximum last 3): [facility]   XR CHEST SNGL V    Narrative Chest single view. No prior comparison study available in [facility] PACS    Hazy reticular markings through the mid and lower lungs. Findings may be related  to mild degree interstitial edema cardiogenic cause and/or  infectious/inflammatory process. Cardiac and mediastinal structures magnified on  this AP view. No pneumothorax or sizable pleural effusion. Assessment:     Armida Sawyer is a 65 y.o. female who presents with shortness of breath, cough, chills, fatigue and weakness. Patient was positive for COVID-19 this past Thursday. Chest x-ray has evidence of bilateral pneumonia. Admit patient for sepsis and acute hypoxic respiratory failure secondary to COVID-19 and community-acquired pneumonia. Plan:     1. Admit to remote telemetry bed. 2. Continue IV Azithromycin and Ceftriaxone. Order sputum and paired blood cultures (obtained after initial administration of antibiotics). Order albuterol as needed for shortness of breath or wheezing. Provide oxygen via nasal cannula for acute hypoxic respiratory failure - yitrate oxygen to maintain oxygen saturations >93%. Patient was administered 1 dose of Decadron in the ER. Continue IV decadron, 6 mg IV Daily. Procalcitonin level is pending at the time of admission. Start IV remdesivir (liver enzymes are within normal limits). Place infectious disease consult for the morning. 3. Continue isolation precautions. 4. Repeat lactic acid. 5. Presented with hypokalemia - potassium and magnesium was replenished in the ER. Continue to monitor electrolytes during hospitalization and replenish as needed. 6. History of diabetes mellitus type 2, check blood glucose with meals and at bedtime. Order sensitive sliding scale insulin.   7. History of hypertension, continue home dose of Norvasc, atenolol, clonidine and lisinopril (with holding parameters). I have held home dose of hydrochlorothiazide. 8. History of hyperlipidemia, continue home dose of pravastatin. GI PPX: Diet Ordered. DVT PPX: Lovenox SQ.      Signed By: Naomi Cordero MD     November 22, 2020

## 2020-11-23 NOTE — CONSULTS
Consult Date: 11/23/2020    Consults: Covid-19 pneumonia/hypoxia    Subjective      This is a 65year old female, asthmatic, who was reportedly diagnosed with Covid-19 3 days PTA who presented with malaise, generalized weakness, cough and SOB. She had low grade temperature with pO2 of only 88%. WBC was normal with mildly elevated lactic acid and normal procalcitonin. Covid-19 rapid test was positive. CXR showed hazy reticular markings through the mid and lower lungs. Patient was started on Azithromycin, Ceftriaxone, Remdesivir and Decadron. ID has been consulted for this reason. Patient affirms history above. States that she has had an asthma attack in \"years\". Affirms nonproductive cough and MUNOZ. States she did have some wheezes prior to admission. Past Medical History:   Diagnosis Date    Acute sinusitis 11/13/2020    Arthritis     Cyst of ovary 11/13/2020    Essential (primary) hypertension 9/26/2017    Glaucoma 11/30/2018    Gout 8/9/2019    Hypercholesterolemia 11/13/2020    Impacted cerumen 11/13/2020    Mild intermittent asthma, uncomplicated 3/40/8060    Mixed hyperlipidemia due to type 2 diabetes mellitus ([address] 9/26/2017    Painless rectal bleeding 11/30/2018    Type 2 diabetes mellitus without complications ([address] 2/14/9602      Past Surgical History:   Procedure Laterality Date    HX COLONOSCOPY      HX HERNIA REPAIR  Unknown date    HX KNEE ARTHROSCOPY Right 01/01/2013    HX OTHER SURGICAL  05/16/2017    Eye surgery procedure    HX PARTIAL HYSTERECTOMY  Unknown date     Family History   Problem Relation Age of Onset    Arthritis-osteo Mother     Hypertension Other     Heart Disease Other     Diabetes Other       Social History     Tobacco Use    Smoking status: Former Smoker     Types: Cigarettes    Smokeless tobacco: Never Used    Tobacco comment: At least 30 years ago.    Substance Use Topics    Alcohol use: Never     Frequency: Never       Current Facility-Administered Medications   Medication Dose Route Frequency Provider Last Rate Last Dose    melatonin tablet 3 mg  3 mg Oral QHS PRN Brady Newton NP        benzonatate (TESSALON) capsule 100 mg  100 mg Oral TID PRN Michelle Live MD        azithromycin (ZITHROMAX) 500 mg in 0.9% sodium chloride 250 mL (VIAL-MATE)  500 mg IntraVENous Q24H Michelle Live MD        cefTRIAXone (ROCEPHIN) 1 g in 0.9% sodium chloride (MBP/ADV) 50 mL MBP  1 g IntraVENous Q24H Michelle Live MD        albuterol (PROVENTIL HFA, VENTOLIN HFA, PROAIR HFA) inhaler 2 Puff  2 Puff Inhalation Q4H PRN Michelle Live MD        insulin lispro (HUMALOG) injection   SubCUTAneous AC&HS Michelle Live MD   4 Units at 11/22/20 2145    glucose chewable tablet 16 g  4 Tab Oral PRN Michelle Live MD        glucagon (GLUCAGEN) injection 1 mg  1 mg IntraMUSCular PRN Michelle Live MD        dextrose (D50W) injection syrg 12.5-25 g  25-50 mL IntraVENous PRN Michelle Live MD        sodium chloride (NS) flush 5-40 mL  5-40 mL IntraVENous Q8H Michelle Live MD        sodium chloride (NS) flush 5-40 mL  5-40 mL IntraVENous PRN Michelle Live MD        acetaminophen (TYLENOL) tablet 650 mg  650 mg Oral Q6H PRN Michelle Live MD        Or    acetaminophen (TYLENOL) suppository 650 mg  650 mg Rectal Q6H PRN Michelle Live MD        polyethylene glycol (MIRALAX) packet 17 g  17 g Oral DAILY PRN Michelle Live MD        promethazine (PHENERGAN) tablet 12.5 mg  12.5 mg Oral Q6H PRN Michelle Live MD        Or    ondansetron [facility]) injection 4 mg  4 mg IntraVENous Q6H PRN Michelle Live MD        enoxaparin (LOVENOX) injection 40 mg  40 mg SubCUTAneous DAILY Michelle Live MD   40 mg at 11/23/20 0920    dexamethasone (DECADRON) 4 mg/mL injection 6 mg  6 mg IntraVENous DAILY Michelle Live MD   6 mg at 11/23/20 0920    remdesivir 100 mg in 0.9% sodium chloride 250 mL IVPB  100 mg IntraVENous Q24H Maikel Beckwith MD        amLODIPine (NORVASC) tablet 10 mg  10 mg Oral DAILY Maikel Beckwith MD   10 mg at 11/23/20 0920    atenoloL (TENORMIN) tablet 50 mg  50 mg Oral DAILY Maikel Beckwith MD   50 mg at 11/23/20 0920    cloNIDine HCL (CATAPRES) tablet 0.2 mg  0.2 mg Oral BID Maikel Beckwith MD   0.2 mg at 11/23/20 0920    lisinopriL (PRINIVIL, ZESTRIL) tablet 20 mg  20 mg Oral DAILY Maikel Beckwith MD   20 mg at 11/23/20 0920    atorvastatin (LIPITOR) tablet 10 mg  10 mg Oral QHS Maikel Beckwith MD   10 mg at 11/22/20 2145        Review of Systems   Constitutional: Positive for activity change and fatigue. Negative for appetite change, chills and fever. HENT: Negative. Eyes: Negative. Respiratory: Positive for cough, shortness of breath and wheezing. Cardiovascular: Negative for chest pain and leg swelling. Gastrointestinal: Negative. Endocrine: Negative. Genitourinary: Negative. Musculoskeletal: Negative. Skin: Negative. Neurological: Positive for dizziness and light-headedness. Psychiatric/Behavioral: Negative. Objective     Vital signs for last 24 hours:  Visit Vitals  BP (!) 161/93   Pulse 75   Temp 98 °F (36.7 °C)   Resp 30   Ht 5' 6\" (1.676 m)   Wt 230 lb (104.3 kg)   SpO2 97%   BMI 37.12 kg/m²       Intake/Output this shift:  Current Shift: No intake/output data recorded.   Last 3 Shifts: 11/21 1901 - 11/23 0700  In: 1050 [I.V.:1050]  Out: 800 [Urine:800]    Data Review:   Recent Results (from the past 24 hour(s))   CBC WITH AUTOMATED DIFF    Collection Time: 11/22/20  4:45 PM   Result Value Ref Range    WBC 7.3 3.6 - 11.0 K/uL    RBC 4.28 3.80 - 5.20 M/uL    HGB 12.7 11.5 - 16.0 g/dL    HCT 39.2 35.0 - 47.0 %    MCV 91.6 80.0 - 99.0 FL    MCH 29.7 26.0 - 34.0 PG    MCHC 32.4 30.0 - 36.5 g/dL    RDW 13.9 11.5 - 14.5 %    PLATELET 556 526 - 132 K/uL    MPV 9.4 8.9 - 12.9 FL    NEUTROPHILS 72 32 - 75 %    LYMPHOCYTES 18 12 - 49 % MONOCYTES 8 5 - 13 %    EOSINOPHILS 1 0 - 7 %    BASOPHILS 0 0 - 1 %    IMMATURE GRANULOCYTES 1 (H) 0.0 - 0.5 %    ABS. NEUTROPHILS 5.3 1.8 - 8.0 K/UL    ABS. LYMPHOCYTES 1.3 0.8 - 3.5 K/UL    ABS. MONOCYTES 0.6 0.0 - 1.0 K/UL    ABS. EOSINOPHILS 0.0 0.0 - 0.4 K/UL    ABS. BASOPHILS 0.0 0.0 - 0.1 K/UL    ABS. IMM. GRANS. 0.0 0.00 - 0.04 K/UL    DF AUTOMATED     METABOLIC PANEL, COMPREHENSIVE    Collection Time: 11/22/20  4:45 PM   Result Value Ref Range    Sodium 140 136 - 145 mmol/L    Potassium 3.3 (L) 3.5 - 5.1 mmol/L    Chloride 99 97 - 108 mmol/L    CO2 34 (H) 21 - 32 mmol/L    Anion gap 7 5 - 15 mmol/L    Glucose 207 (H) 65 - 100 mg/dL    BUN 13 6 - 20 mg/dL    Creatinine 0.86 0.55 - 1.02 mg/dL    BUN/Creatinine ratio 15 12 - 20      GFR est AA >60 >60 ml/min/1.73m2    GFR est non-AA >60 >60 ml/min/1.73m2    Calcium 9.6 8.5 - 10.1 mg/dL    Bilirubin, total 0.3 0.2 - 1.0 mg/dL    AST (SGOT) 34 15 - 37 U/L    ALT (SGPT) 46 12 - 78 U/L    Alk.  phosphatase 85 45 - 117 U/L    Protein, total 8.5 (H) 6.4 - 8.2 g/dL    Albumin 3.2 (L) 3.5 - 5.0 g/dL    Globulin 5.3 (H) 2.0 - 4.0 g/dL    A-G Ratio 0.6 (L) 1.1 - 2.2     TROPONIN I    Collection Time: 11/22/20  4:45 PM   Result Value Ref Range    Troponin-I, Qt. <0.05 <0.05 ng/mL   BNP    Collection Time: 11/22/20  4:45 PM   Result Value Ref Range    NT pro-BNP 57 <125 pg/mL   PROTHROMBIN TIME + INR    Collection Time: 11/22/20  4:45 PM   Result Value Ref Range    Prothrombin time 13.1 11.9 - 14.7 sec    INR 1.0 0.9 - 1.1     PTT    Collection Time: 11/22/20  4:45 PM   Result Value Ref Range    aPTT 25.7 23.0 - 35.7 sec    aPTT, therapeutic range   68 - 109 sec   URINALYSIS W/ REFLEX CULTURE    Collection Time: 11/22/20  4:45 PM    Specimen: Urine   Result Value Ref Range    Color Yellow/Straw      Appearance Clear Clear      Specific gravity 1.010 1.003 - 1.030      pH (UA) 6.0 5.0 - 8.0      Protein 30 (A) Negative mg/dL    Glucose Negative Negative mg/dL    Ketone Negative Negative mg/dL    Bilirubin Negative Negative      Blood Small (A) Negative      Urobilinogen 0.1 0.1 - 1.0 EU/dL    Nitrites Negative Negative      Leukocyte Esterase Trace (A) Negative      UA:UC IF INDICATED Culture not indicated by UA result Culture not indicated by UA result      WBC 0-4 0 - 4 /hpf    RBC 10-20 0 - 5 /hpf    Bacteria Negative Negative /hpf    Mucus Trace /lpf   PROCALCITONIN    Collection Time: 11/22/20  4:45 PM   Result Value Ref Range    Procalcitonin <0.05 (H) 0 ng/mL   LACTIC ACID    Collection Time: 11/22/20  4:45 PM   Result Value Ref Range    Lactic acid 2.3 (HH) 0.4 - 2.0 mmol/L   TSH 3RD GENERATION    Collection Time: 11/22/20  4:45 PM   Result Value Ref Range    TSH 0.85 0.36 - 3.74 uIU/mL   MAGNESIUM    Collection Time: 11/22/20  4:45 PM   Result Value Ref Range    Magnesium 1.5 (L) 1.6 - 2.4 mg/dL   LACTIC ACID    Collection Time: 11/22/20  8:21 PM   Result Value Ref Range    Lactic acid 1.8 0.4 - 2.0 mmol/L   PROCALCITONIN    Collection Time: 11/22/20  8:21 PM   Result Value Ref Range    Procalcitonin <0.05 (H) 0 ng/mL   GLUCOSE, POC    Collection Time: 11/22/20  8:41 PM   Result Value Ref Range    Glucose (POC) 214 (H) 65 - 100 mg/dL    Performed by Carlos Alberto Kidd    SARS-COV-2    Collection Time: 11/23/20 12:47 AM   Result Value Ref Range    SARS-CoV-2 Please find results under separate order     COVID-19 RAPID TEST    Collection Time: 11/23/20 12:47 AM   Result Value Ref Range    Specimen source Nasopharyngeal      COVID-19 rapid test Positive (A) Not Detected         Physical Exam  Vitals signs and nursing note reviewed. Constitutional:       General: She is not in acute distress. Appearance: She is ill-appearing. She is not diaphoretic. HENT:      Head: Normocephalic and atraumatic. Nose: Nose normal.      Mouth/Throat:      Pharynx: Oropharynx is clear. Neck:      Musculoskeletal: Neck supple.    Cardiovascular:      Rate and Rhythm: Normal rate and regular rhythm. Heart sounds: No murmur. Pulmonary:      Breath sounds: No wheezing, rhonchi or rales. Abdominal:      Palpations: Abdomen is soft. Tenderness: There is no abdominal tenderness. Genitourinary:     Comments: No [place]  Musculoskeletal:      Right lower leg: No edema. Left lower leg: No edema. Skin:     Findings: No rash. Neurological:      General: No focal deficit present. Mental Status: She is alert and oriented to person, place, and time. Psychiatric:         Mood and Affect: Mood normal.         Behavior: Behavior normal.         Thought Content: Thought content normal.         Judgment: Judgment normal.       ASSESSMENT/PLAN    1. Covid-19 infection with ?pneumonitis  2. ?Asthma exacerbation  3. Acute hypoxic respiratory failure    Comment:  Some question here whether her respiratory condition is due to Covid-19 or asthma, or both. I think that it would be important to try and sort this out, to avoid unnecessary treatment for Covid-19. 1. Continue Zithromax, Ceftriaxone, Remdesivir and Decadron for now  2. CT Chest without contrast  3. CRP, LDH and ferritin stat  4. Supplemental oxygen    Neal Trujillo MD

## 2020-11-23 NOTE — PROGRESS NOTES
Progress Note Date:2020       Room:[facility] Patient Name:Zeynep Mcdonald     YOB: 1950     Age:70 y.o. Subjective Subjective: 
Symptoms:  Stable. She reports shortness of breath, cough and weakness. No chest pain or chest pressure. Diet:  Poor intake. No nausea or vomiting. Activity level: Impaired due to weakness. Review of Systems Constitutional: Positive for activity change, appetite change and fatigue. Negative for chills and fever. Eyes: Negative. Respiratory: Positive for cough and shortness of breath. Negative for choking. Cardiovascular: Negative. Negative for chest pain and leg swelling. Gastrointestinal: Negative. Negative for abdominal distention, abdominal pain, nausea and vomiting. Genitourinary: Negative. Musculoskeletal: Negative. Skin: Negative. Allergic/Immunologic: Negative. Neurological: Positive for weakness. Hematological: Negative. Psychiatric/Behavioral: Negative. Objective Vitals Last 24 Hours: TEMPERATURE:  Temp  Av.5 °F (36.9 °C)  Min: 98 °F (36.7 °C)  Max: 99.1 °F (37.3 °C) RESPIRATIONS RANGE: Resp  Av.9  Min: 20  Max: 33 PULSE OXIMETRY RANGE: SpO2  Av.1 %  Min: 88 %  Max: 99 % PULSE RANGE: Pulse  Av.4  Min: 75  Max: 97 
BLOOD PRESSURE RANGE: Systolic (30TYU), ZCZ:537 , Min:128 , DTA:312  
; Diastolic (20CMU), AMS:53, Min:80, Max:120 I/O (24Hr): Intake/Output Summary (Last 24 hours) at 2020 1201 Last data filed at 2020 2309 Gross per 24 hour Intake 1050 ml Output 800 ml Net 250 ml Objective: 
General Appearance:  Ill-appearing and not in pain. Vital signs: (most recent): Blood pressure (!) 161/93, pulse 75, temperature 98 °F (36.7 °C), resp. rate 30, height 5' 6\" (1.676 m), weight 104.3 kg (230 lb), SpO2 97 %. No fever. (Vitals reviewed ). Output: Producing urine.    
HEENT: Normal HEENT exam.   
 Lungs: Tachypnea and increased effort. There are decreased breath sounds. (4L NC, Coarse lung sounds bilat. ) Heart: Normal rate. Regular rhythm. S1 normal and S2 normal.  No murmur or gallop. Chest: Symmetric chest wall expansion. No chest wall tenderness. Abdomen: Abdomen is soft. Bowel sounds are normal.   There is no abdominal tenderness. Extremities: Normal range of motion. Pulses: Distal pulses are intact. Neurological: Patient is alert and oriented to person, place and time. GCS score is 15. Pupils:  Pupils are equal, round, and reactive to light. Skin:  Warm and dry. Labs/Imaging/Diagnostics Labs: CBC: 
Recent Labs  
  11/23/20 
0945 11/22/20 
1645 WBC 6.1 7.3  
RBC 3.90 4.28 HGB 11.5 12.7 HCT 36.0 39.2 MCV 92.3 91.6 RDW 13.9 13.9 * 389 CHEMISTRIES: 
Recent Labs  
  11/22/20 
1645   
K 3.3*  
CL 99  
CO2 34* BUN 13  
CREA 0.86 CA 9.6 MG 1.5* PT/INR: 
Recent Labs  
  11/22/20 
1645 INR 1.0 APTT: 
Recent Labs  
  11/22/20 
1645 APTT 25.7 LIVER PROFILE: 
Recent Labs  
  11/22/20 
1645 AST 34 ALT 46 Lab Results Component Value Date/Time ALT (SGPT) 46 11/22/2020 04:45 PM  
 AST (SGOT) 34 11/22/2020 04:45 PM  
 Alk. phosphatase 85 11/22/2020 04:45 PM  
 Bilirubin, total 0.3 11/22/2020 04:45 PM  
 
 
Imaging Last 24 Hours: 
Xr Chest Sngl V Result Date: 11/22/2020 Chest single view. No prior comparison study available in [facility] PACS Hazy reticular markings through the mid and lower lungs. Findings may be related to mild degree interstitial edema cardiogenic cause and/or infectious/inflammatory process. Cardiac and mediastinal structures magnified on this AP view. No pneumothorax or sizable pleural effusion. Assessment//Plan Patient Active Problem List  
 Diagnosis Date Noted  Hypokalemia 11/22/2020  Pneumonia 11/22/2020  COVID-19 11/22/2020  Acute sinusitis 11/13/2020  Impacted cerumen 11/13/2020  Arthritis 11/13/2020  Cyst of ovary 11/13/2020  Hypercholesterolemia 11/13/2020  Severe obesity ([facility] 08/10/2020  Mild intermittent asthma, uncomplicated 37/38/9146  Asthma 02/10/2020  Gout 08/09/2019  Glaucoma 11/30/2018  Painless rectal bleeding 11/30/2018  Essential (primary) hypertension 09/26/2017  Mixed hyperlipidemia due to type 2 diabetes mellitus ([facility] 09/26/2017  Type 2 diabetes mellitus without complications ([facility] [facility] 28/60/1626  Osteoarthritis involving multiple joints on both sides of body 08/08/2017  History of total knee replacement 06/30/2017 Assessment & Plan Shortness of Breath - Ongoing SOB. 4LNC oxgen. Shortness of Breath Saturation %. Cxr 11/22 Hazy reticular markings through the mid and lower lungs. Continue oxygen therapy. Titrate oxygen to maintain saturation >90%. Continue Albuterol via MDI PRN.  
  
Hypoxia: Oxygen Saturation 80's on RA on arrival. 4LNC Self reported SOB. Saturation %. Cxr 11/22 Hazy reticular markings through the mid and lower lungs. Continue oxygen therapy. Titrate oxygen to maintain saturation >90%. Continue Albuterol via MDI PRN. Covid 19 Infection - Rapid covid test positive. Continue Covid Isolation. Consult Pulmonology for recs. Continue oxygen therapy. Titrate oxygen to maintain saturation >90%. Continue Albuterol via MDI PRN. Continue Remdesivir, dexamethasone treatment.   
  
Suspected Covid 19 Pneumonia - Continue Azithromycin and Ceftriaxone.  
  
HTN: Continue amlodipine, atenolol, clonidine.  
  
Hypokalemia: 3.3 on admission. K replaced. DM Type II: ACHS Glucose checks. ISS. Monitor for signs of hypoglycemia. Hyperlipidemia: Continue home dose Lipitor 
  PLAN: 
Consult Pulmonology Continue ABX treatment Continue Remdesivir, dexamethasone treatment. Wean oxygen to maintain sats >90% Repeat AM Labs Continue antihypertensive meds   
 
 Electronically signed by Dev Degroot on 11/23/2020 at 12:01 PM

## 2020-11-24 ENCOUNTER — APPOINTMENT (OUTPATIENT)
Dept: CT IMAGING | Age: 70
DRG: 177 | End: 2020-11-24
Attending: INTERNAL MEDICINE
Payer: MEDICARE

## 2020-11-24 LAB
FERRITIN SERPL-MCNC: 183 NG/ML (ref 8–252)
GLUCOSE BLD STRIP.AUTO-MCNC: 196 MG/DL (ref 65–100)
GLUCOSE BLD STRIP.AUTO-MCNC: 231 MG/DL (ref 65–100)
GLUCOSE BLD STRIP.AUTO-MCNC: 290 MG/DL (ref 65–100)
GLUCOSE BLD STRIP.AUTO-MCNC: 360 MG/DL (ref 65–100)
LDLC SERPL DIRECT ASSAY-MCNC: 90 MG/DL (ref 0–100)
PERFORMED BY, TECHID: ABNORMAL

## 2020-11-24 PROCEDURE — 74011636637 HC RX REV CODE- 636/637: Performed by: FAMILY MEDICINE

## 2020-11-24 PROCEDURE — 74011636637 HC RX REV CODE- 636/637: Performed by: NURSE PRACTITIONER

## 2020-11-24 PROCEDURE — 74011250636 HC RX REV CODE- 250/636: Performed by: FAMILY MEDICINE

## 2020-11-24 PROCEDURE — 65270000029 HC RM PRIVATE

## 2020-11-24 PROCEDURE — 77010033678 HC OXYGEN DAILY

## 2020-11-24 PROCEDURE — 82962 GLUCOSE BLOOD TEST: CPT

## 2020-11-24 PROCEDURE — 74011250637 HC RX REV CODE- 250/637: Performed by: FAMILY MEDICINE

## 2020-11-24 PROCEDURE — 74011000250 HC RX REV CODE- 250: Performed by: FAMILY MEDICINE

## 2020-11-24 PROCEDURE — 74011000258 HC RX REV CODE- 258: Performed by: FAMILY MEDICINE

## 2020-11-24 PROCEDURE — 99232 SBSQ HOSP IP/OBS MODERATE 35: CPT | Performed by: INTERNAL MEDICINE

## 2020-11-24 PROCEDURE — 74011000250 HC RX REV CODE- 250: Performed by: INTERNAL MEDICINE

## 2020-11-24 PROCEDURE — 94762 N-INVAS EAR/PLS OXIMTRY CONT: CPT

## 2020-11-24 PROCEDURE — 74011250637 HC RX REV CODE- 250/637: Performed by: NURSE PRACTITIONER

## 2020-11-24 RX ORDER — DORZOLAMIDE HCL 20 MG/ML
1 SOLUTION/ DROPS OPHTHALMIC 2 TIMES DAILY
Status: DISCONTINUED | OUTPATIENT
Start: 2020-11-24 | End: 2020-11-27 | Stop reason: HOSPADM

## 2020-11-24 RX ORDER — TIMOLOL MALEATE 5 MG/ML
1 SOLUTION/ DROPS OPHTHALMIC DAILY
Status: DISCONTINUED | OUTPATIENT
Start: 2020-11-24 | End: 2020-11-27 | Stop reason: HOSPADM

## 2020-11-24 RX ORDER — BRIMONIDINE TARTRATE 2 MG/ML
1 SOLUTION/ DROPS OPHTHALMIC 2 TIMES DAILY
Status: DISCONTINUED | OUTPATIENT
Start: 2020-11-24 | End: 2020-11-27 | Stop reason: HOSPADM

## 2020-11-24 RX ORDER — LATANOPROST 50 UG/ML
1 SOLUTION/ DROPS OPHTHALMIC EVERY EVENING
Status: DISCONTINUED | OUTPATIENT
Start: 2020-11-24 | End: 2020-11-27 | Stop reason: HOSPADM

## 2020-11-24 RX ORDER — INSULIN LISPRO 100 [IU]/ML
8 INJECTION, SOLUTION INTRAVENOUS; SUBCUTANEOUS
Status: DISCONTINUED | OUTPATIENT
Start: 2020-11-24 | End: 2020-11-25

## 2020-11-24 RX ADMIN — Medication 10 ML: at 14:37

## 2020-11-24 RX ADMIN — DEXAMETHASONE SODIUM PHOSPHATE 6 MG: 4 INJECTION, SOLUTION INTRAMUSCULAR; INTRAVENOUS at 09:34

## 2020-11-24 RX ADMIN — BRIMONIDINE TARTRATE 1 DROP: 2 SOLUTION OPHTHALMIC at 21:46

## 2020-11-24 RX ADMIN — Medication 10 ML: at 21:47

## 2020-11-24 RX ADMIN — BRIMONIDINE TARTRATE 1 DROP: 2 SOLUTION OPHTHALMIC at 09:00

## 2020-11-24 RX ADMIN — TIMOLOL MALEATE 1 DROP: 5 SOLUTION/ DROPS OPHTHALMIC at 09:00

## 2020-11-24 RX ADMIN — LATANOPROST 1 DROP: 50 SOLUTION OPHTHALMIC at 21:55

## 2020-11-24 RX ADMIN — INSULIN LISPRO 3 UNITS: 100 INJECTION, SOLUTION INTRAVENOUS; SUBCUTANEOUS at 14:35

## 2020-11-24 RX ADMIN — ENOXAPARIN SODIUM 40 MG: 40 INJECTION SUBCUTANEOUS at 09:35

## 2020-11-24 RX ADMIN — SODIUM CHLORIDE 100 MG: 9 INJECTION, SOLUTION INTRAVENOUS at 21:41

## 2020-11-24 RX ADMIN — INSULIN LISPRO 10 UNITS: 100 INJECTION, SOLUTION INTRAVENOUS; SUBCUTANEOUS at 16:17

## 2020-11-24 RX ADMIN — LISINOPRIL 20 MG: 20 TABLET ORAL at 09:35

## 2020-11-24 RX ADMIN — INSULIN LISPRO 2 UNITS: 100 INJECTION, SOLUTION INTRAVENOUS; SUBCUTANEOUS at 09:34

## 2020-11-24 RX ADMIN — INSULIN LISPRO 8 UNITS: 100 INJECTION, SOLUTION INTRAVENOUS; SUBCUTANEOUS at 16:14

## 2020-11-24 RX ADMIN — AMLODIPINE BESYLATE 10 MG: 5 TABLET ORAL at 09:35

## 2020-11-24 RX ADMIN — DORZOLAMIDE HYDROCHLORIDE 1 DROP: 20 SOLUTION/ DROPS OPHTHALMIC at 21:46

## 2020-11-24 RX ADMIN — AZITHROMYCIN 500 MG: 500 INJECTION, POWDER, LYOPHILIZED, FOR SOLUTION INTRAVENOUS at 16:11

## 2020-11-24 RX ADMIN — DORZOLAMIDE HYDROCHLORIDE 1 DROP: 20 SOLUTION/ DROPS OPHTHALMIC at 09:00

## 2020-11-24 RX ADMIN — CLONIDINE HYDROCHLORIDE 0.2 MG: 0.1 TABLET ORAL at 20:53

## 2020-11-24 RX ADMIN — MELATONIN TAB 3 MG 3 MG: 3 TAB at 21:53

## 2020-11-24 RX ADMIN — INSULIN LISPRO 6 UNITS: 100 INJECTION, SOLUTION INTRAVENOUS; SUBCUTANEOUS at 21:52

## 2020-11-24 RX ADMIN — ATENOLOL 50 MG: 50 TABLET ORAL at 09:35

## 2020-11-24 RX ADMIN — CEFTRIAXONE 1 G: 1 INJECTION, POWDER, FOR SOLUTION INTRAMUSCULAR; INTRAVENOUS at 16:12

## 2020-11-24 RX ADMIN — ATORVASTATIN CALCIUM 10 MG: 10 TABLET, FILM COATED ORAL at 21:45

## 2020-11-24 RX ADMIN — CLONIDINE HYDROCHLORIDE 0.2 MG: 0.1 TABLET ORAL at 09:35

## 2020-11-24 RX ADMIN — Medication 10 ML: at 05:54

## 2020-11-24 NOTE — PROGRESS NOTES
OT eval order received and acknowledged. OT eval attempted at 9:45 am however patient declining participation with therapy at this time stating that they are too cold to get out of bed. Will continue to follow patient and attempt OT eval at a later time. Thank you.

## 2020-11-24 NOTE — PROGRESS NOTES
Hospitalist Progress Note    Subjective:   Daily Progress Note: 11/24/2020 2:49 PM    Hospital Course:   Armida Sawyer is a 65 y.o. female with past medical history of asthma, non-insulin-dependent diabetes mellitus type 2, hypertension and hyperlipidemia presenting to the ER with complaints of shortness of breath, cough, lightheadedness, weakness, dizziness, chills, fatigue and nasal congestion. Ms. Jose Simeon was found positive for COVID-19 on November 17, 2020. Since diagnosis, symptoms have progressively worsened over the past several days. Ms. Jose Simeon has not used her albuterol inhaler for symptoms. Ms. Jose Simeon was prescribed azithromycin on November 17th and has taken as prescribed. She presents this evening for further treatment and evaluation.   Subjective: Pt seen in room, could not lay  flat for  CT scan today, was short of breath      Current Facility-Administered Medications   Medication Dose Route Frequency    latanoprost (XALATAN) 0.005 % ophthalmic solution 1 Drop  1 Drop Left Eye QPM    timolol (TIMOPTIC) 0.5 % ophthalmic solution 1 Drop  1 Drop Left Eye DAILY    dorzolamide (TRUSOPT) 2 % ophthalmic solution 1 Drop  1 Drop Left Eye BID    brimonidine (ALPHAGAN) 0.2 % ophthalmic solution 1 Drop  1 Drop Left Eye BID    melatonin tablet 3 mg  3 mg Oral QHS PRN    benzonatate (TESSALON) capsule 100 mg  100 mg Oral TID PRN    azithromycin (ZITHROMAX) 500 mg in 0.9% sodium chloride 250 mL (VIAL-MATE)  500 mg IntraVENous Q24H    cefTRIAXone (ROCEPHIN) 1 g in 0.9% sodium chloride (MBP/ADV) 50 mL MBP  1 g IntraVENous Q24H    albuterol (PROVENTIL HFA, VENTOLIN HFA, PROAIR HFA) inhaler 2 Puff  2 Puff Inhalation Q4H PRN    insulin lispro (HUMALOG) injection   SubCUTAneous AC&HS    glucose chewable tablet 16 g  4 Tab Oral PRN    glucagon (GLUCAGEN) injection 1 mg  1 mg IntraMUSCular PRN    dextrose (D50W) injection syrg 12.5-25 g  25-50 mL IntraVENous PRN    sodium chloride (NS) flush 5-40 mL  5-40 mL IntraVENous Q8H    sodium chloride (NS) flush 5-40 mL  5-40 mL IntraVENous PRN    acetaminophen (TYLENOL) tablet 650 mg  650 mg Oral Q6H PRN    Or    acetaminophen (TYLENOL) suppository 650 mg  650 mg Rectal Q6H PRN    polyethylene glycol (MIRALAX) packet 17 g  17 g Oral DAILY PRN    promethazine (PHENERGAN) tablet 12.5 mg  12.5 mg Oral Q6H PRN    Or    ondansetron (ZOFRAN) injection 4 mg  4 mg IntraVENous Q6H PRN    enoxaparin (LOVENOX) injection 40 mg  40 mg SubCUTAneous DAILY    dexamethasone (DECADRON) 4 mg/mL injection 6 mg  6 mg IntraVENous DAILY    remdesivir 100 mg in 0.9% sodium chloride 250 mL IVPB  100 mg IntraVENous Q24H    amLODIPine (NORVASC) tablet 10 mg  10 mg Oral DAILY    atenoloL (TENORMIN) tablet 50 mg  50 mg Oral DAILY    cloNIDine HCL (CATAPRES) tablet 0.2 mg  0.2 mg Oral BID    lisinopriL (PRINIVIL, ZESTRIL) tablet 20 mg  20 mg Oral DAILY    atorvastatin (LIPITOR) tablet 10 mg  10 mg Oral QHS        Review of Systems:    Review of Systems   Constitutional: Negative for fever and malaise/fatigue. HENT: Negative for sore throat. Respiratory: Positive for cough and shortness of breath. Negative for sputum production. Cardiovascular: Negative for chest pain and palpitations. Gastrointestinal: Negative for abdominal pain, heartburn and vomiting. Genitourinary: Negative for dysuria and frequency. Neurological: Negative for dizziness, weakness and headaches. Objective:     Visit Vitals  BP (!) 140/73 (BP 1 Location: Left arm, BP Patient Position: At rest)   Pulse (!) 57   Temp 98.2 °F (36.8 °C)   Resp 18   Ht 5' 6\" (1.676 m)   Wt 104.3 kg (230 lb)   SpO2 96%   BMI 37.12 kg/m²    O2 Flow Rate (L/min): 3 l/min O2 Device: Nasal cannula    Temp (24hrs), Av.1 °F (36.7 °C), Min:97.9 °F (36.6 °C), Max:98.2 °F (36.8 °C)      No intake/output data recorded.     0700  In: 1050 [I.V.:1050]  Out: 800 [Urine:800]    PHYSICAL EXAM:    Physical Exam Constitutional: She is oriented to person, place, and time. She appears well-developed. No distress. Neck: Normal range of motion. Neck supple. Cardiovascular: Normal rate, regular rhythm, normal heart sounds and intact distal pulses. Pulmonary/Chest: Effort normal. She has wheezes. She has rales. Abdominal: Soft. Bowel sounds are normal.   Musculoskeletal: Normal range of motion. Neurological: She is alert and oriented to person, place, and time. Skin: Skin is warm and dry. Psychiatric: She has a normal mood and affect. Her behavior is normal.          Data Review    Recent Results (from the past 24 hour(s))   GLUCOSE, POC    Collection Time: 11/23/20  8:09 PM   Result Value Ref Range    Glucose (POC) 352 (H) 65 - 100 mg/dL    Performed by [address] [place] [address], POC    Collection Time: 11/24/20  8:31 AM   Result Value Ref Range    Glucose (POC) 196 (H) 65 - 100 mg/dL    Performed by [facility] Browning    GLUCOSE, POC    Collection Time: 11/24/20 12:12 PM   Result Value Ref Range    Glucose (POC) 231 (H) 65 - 100 mg/dL    Performed by [employer]k[facility] Browning        XR CHEST SNGL V   Final Result      CT CHEST WO CONT    (Results Pending)       Principal Problem:    COVID-19 (11/22/2020)    Active Problems:    Hypokalemia (11/22/2020)      Pneumonia (11/22/2020)        Assessment/Plan:   1. Covid 19 infection/pneumonia with hypoxia- will consult pulmonary, ID following,  Wean O2 as tolerated, IV decadron, zithromax, IV remedesivir, CT chest could not be done due to pt orthopnea/SOB. 2. History of asthma- prn albuterol,     3. Hypertension- controlled, continue home medications    4.  DM II- s/s coverage, add tid mealtime insulin        DVT Prophylaxis: lovenox  Code Status:  Full Code  POA:    Care Plan discussed with:   _______patient, staff nurse, ID, pulmonary________________________________________________________    Ilya Roque NP

## 2020-11-24 NOTE — ROUTINE PROCESS
PT eval order received and acknowledged. PT eval attempted at 0945 however pt reported she was too cold to get out of bed at this time due to heat being broken in room. Will continue to follow patient and attempt PT eval at a later time. Thank you.

## 2020-11-24 NOTE — CONSULTS
Pulmonary and Critical Care Consult    Subjective:   Consult Note: 11/24/2020 @no control      Chief Complaint:   Chief Complaint   Patient presents with    Shortness of Breath    Concern For COVID-19 (Coronavirus)        This patient has been seen and evaluated at the request of Elmo Alvarado NP. I am asked to see patient for Covid pneumonitis    Ex-smoker of 1 pack a day for 40 years  Quit smoking 2 years back  Longstanding history of asthma  History of recurrent bronchitis  Never been diagnosed with COPD  However she tells me that after a previous hospitalization several years back she was placed on oxygen at home  Not using oxygen at home currently    Also has a past history of obesity, diabetes mellitus type 2, hypertension, and hyperlipidemia    Presented to the ER 11/22/20 with complaints of shortness of breath, cough, lightheadedness, weakness, dizziness, chills, fatigue and nasal congestion.      Ms. Alexander Sheppard was found positive for COVID-19 on November 17, 2020.    Since diagnosis, symptoms have progressively worsened over the past several days. CRP 10    Ferritin 183  Rapid Covid test positive at [facility][1]    Chest x-ray reviewed and shows:  Hypoinflated lungs  Bilateral perihilar interstitial prominence ?vascular congestion    Review of Systems:  A comprehensive review of systems was negative except for that written in the HPI. Past Surgical History:   Procedure Laterality Date    HX COLONOSCOPY      HX HERNIA REPAIR  Unknown date    HX KNEE ARTHROSCOPY Right 01/01/2013    HX OTHER SURGICAL  05/16/2017    Eye surgery procedure    HX PARTIAL HYSTERECTOMY  Unknown date      Family History   Problem Relation Age of Onset   [facility] Arthritis-osteo Mother     Hypertension Other     Heart Disease Other     Diabetes Other      Social History     Tobacco Use    Smoking status: Former Smoker     Types: Cigarettes    Smokeless tobacco: Never Used    Tobacco comment:  At least 30 years ago.   Substance Use Topics    Alcohol use: Never     Frequency: Never      Current Facility-Administered Medications   Medication Dose Route Frequency Provider Last Rate Last Dose    latanoprost (XALATAN) 0.005 % ophthalmic solution 1 Drop  1 Drop Left Eye QPM Luis Colon MD        timolol (TIMOPTIC) 0.5 % ophthalmic solution 1 Drop  1 Drop Left Eye DAILY Luis Colon MD   1 Drop at 11/24/20 0900    dorzolamide (TRUSOPT) 2 % ophthalmic solution 1 Drop  1 Drop Left Eye BID Luis Colon MD   1 Drop at 11/24/20 0900    brimonidine (ALPHAGAN) 0.2 % ophthalmic solution 1 Drop  1 Drop Left Eye BID Luis Colon MD   1 Drop at 11/24/20 0900    insulin lispro (HUMALOG) injection 8 Units  8 Units SubCUTAneous TIDAC Amanuel Baldwin NP        melatonin tablet 3 mg  3 mg Oral QHS PRN Solan Peña NP   3 mg at 11/23/20 2142    benzonatate (TESSALON) capsule 100 mg  100 mg Oral TID PRN Sakina Adams MD   100 mg at 11/23/20 2143    azithromycin (ZITHROMAX) 500 mg in 0.9% sodium chloride 250 mL (VIAL-MATE)  500 mg IntraVENous Q24H [place] Vandana MANCILLA MD   500 mg at 11/23/20 1757    cefTRIAXone (ROCEPHIN) 1 g in 0.9% sodium chloride (MBP/ADV) 50 mL MBP  1 g IntraVENous Q24H [place] Vandana MANCILLA  mL/hr at 11/23/20 1758 1 g at 11/23/20 1758    albuterol (PROVENTIL HFA, VENTOLIN HFA, PROAIR HFA) inhaler 2 Puff  2 Puff Inhalation Q4H PRN Sakina Adams MD        insulin lispro (HUMALOG) injection   SubCUTAneous AC&HS Sakina Adams MD   3 Units at 11/24/20 1435    glucose chewable tablet 16 g  4 Tab Oral PRN Sakina Adams MD        glucagon (GLUCAGEN) injection 1 mg  1 mg IntraMUSCular PRN Sakina Adams MD        dextrose (D50W) injection syrg 12.5-25 g  25-50 mL IntraVENous PRN Sakina Adams MD        sodium chloride (NS) flush 5-40 mL  5-40 mL IntraVENous Q8H Sakina Adams MD   10 mL at 11/24/20 1437    sodium chloride (NS) flush 5-40 mL  5-40 mL IntraVENous PRN Daniel Ramírez, Benny Good MD        acetaminophen (TYLENOL) tablet 650 mg  650 mg Oral Q6H PRN Jillian Wiggins MD   650 mg at 20    Or    acetaminophen (TYLENOL) suppository 650 mg  650 mg Rectal Q6H PRN Jillian Wiggins MD        polyethylene glycol (MIRALAX) packet 17 g  17 g Oral DAILY PRN Jillian Wiggins MD        promethazine (PHENERGAN) tablet 12.5 mg  12.5 mg Oral Q6H PRN Jillian Wiggins MD        Or    [facility]dansetron [facility]) injection 4 mg  4 mg IntraVENous Q6H PRN Jillian Wiggins MD        enoxaparin (LOVENOX) injection 40 mg  40 mg SubCUTAneous DAILY Jillian Wiggins MD   40 mg at 20 09    dexamethasone (DECADRON) 4 mg/mL injection 6 mg  6 mg IntraVENous DAILY Jillian Wiggins MD   6 mg at 20 0934    remdesivir 100 mg in 0.9% sodium chloride 250 mL IVPB  100 mg IntraVENous Q24H Oral Regan MANCILLA MD   100 mg at 20    amLODIPine (NORVASC) tablet 10 mg  10 mg Oral DAILY Jillian Wiggins MD   10 mg at 2035    atenoloL (TENORMIN) tablet 50 mg  50 mg Oral DAILY Jillian Wiggins MD   50 mg at 20 0935    cloNIDine HCL (CATAPRES) tablet 0.2 mg  0.2 mg Oral BID Jillian Wiggins MD   0.2 mg at 2035    lisinopriL (PRINIVIL, ZESTRIL) tablet 20 mg  20 mg Oral DAILY Jillian Wiggins MD   20 mg at 20 0935    atorvastatin (LIPITOR) tablet 10 mg  10 mg Oral QHS Jillian Wiggins MD   10 mg at 20          Allergies   Allergen Reactions    Aspirin Shortness of Breath     Respiratory distress    Codeine Vertigo           Objective:     Blood pressure (!) 140/73, pulse (!) 57, temperature 98.2 °F (36.8 °C), resp. rate 18, height 5' 6\" (1.676 m), weight 104.3 kg (230 lb), SpO2 96 %. Temp (24hrs), Av.1 °F (36.7 °C), Min:97.9 °F (36.6 °C), Max:98.2 °F (36.8 °C)      Intake and Output:  Current Shift: No intake/output data recorded.   Last 3 Shifts:  1901 -  0700  In: 1050 [I.V.:1050]  Out: 800 [Urine:800]    Physical Exam: General: Lying in bed comfortably, no acute distress, obese  Eye: Reactive, symmetric  Throat and Neck: Supple  Lung: Reduced air entry bilaterally with prolonged exhalation but no wheezing. Occasional crackles. Heart: S1+S2. No murmurs  Abdomen: soft, non-tender. Bowel sounds normal. No masses; obese  Extremities: No edema  : Not done  Skin: No cyanosis  Neurologic: A & O x3. Grossly nonfocal  Psychiatric: Appropriate affect; coherent      Lab/Data Review:    Recent Results (from the past 24 hour(s))   GLUCOSE, POC    Collection Time: 11/23/20  8:09 PM   Result Value Ref Range    Glucose (POC) 352 (H) 65 - 100 mg/dL    Performed by [address] [place] [address], POC    Collection Time: 11/24/20  8:31 AM   Result Value Ref Range    Glucose (POC) 196 (H) 65 - 100 mg/dL    Performed by Sue Urbina    GLUCOSE, POC    Collection Time: 11/24/20 12:12 PM   Result Value Ref Range    Glucose (POC) 231 (H) 65 - 100 mg/dL    Performed by Sherita ALFRED        XR CHEST SNGL V   Final Result      CT CHEST WO CONT    (Results Pending)       CT Results  (Last 48 hours)    None            Assessment:     1. Acute respiratory failure with hypoxia  2. COVID-19 pneumonia  3. Acute exacerbation of COPD  4. Ex-smoker  5. Hypokalemia  6. Shortness of breath  7.   Cough    Plan:     Patient admitted to the hospital  Will be watched here closely    Nasal cannula oxygen as needed to keep saturation above 92%  Currently 3 L will cannula oxygen    Currently on remdesivir  Continues on Decadron 6 mg daily  Nebulizers on hold given patient's Covid positive status  Continue albuterol    Likely element of COPD exacerbation  This should be covered with Decadron and inhalers  Continue azithromycin and Rocephin for antibiotic coverage    Follow clinically  Await CT chest, I am not able to do this yesterday since patient unable to lie flat    DVT and GI prophylaxis    Questions of patient were answered at bedside in detail  Case discussed in detail with RN, RT, and care team    Thank you for involving me in the care of this patient  I will follow with you closely during hospitalization    Mindi Brandt MD  [facility]  11/24/2020  3:19 PM

## 2020-11-24 NOTE — PROGRESS NOTES
Progress Note    Patient: Kai Gonzalez MRN: [id number]  SSN: [id number]    YOB: 1950  Age: 65 y.o. Sex: female      Admit Date: 11/22/2020    LOS: 2 days     Subjective:   Patient followed for Covid-19 infection and respiratory failure with underlying history of asthma. CT Chest ordered but not yet done. She remains afebrile with normal WBC and procalcitonin but increased LDH and CRP. Ferritin normal.  No cultures. She is currently on Zithromax, Decadron and Remdesivir. Patient still SOB on exertion. Objective:     Vitals:    11/24/20 0800 11/24/20 0827 11/24/20 1100 11/24/20 1114   BP:  (!) 140/73     Pulse: 66 (!) 57     Resp:  18     Temp:  98.2 °F (36.8 °C)     SpO2:  98% 98% 96%   Weight:       Height:            Intake and Output:  Current Shift: No intake/output data recorded. Last three shifts: 11/22 1901 - 11/24 0700  In: 1050 [I.V.:1050]  Out: 800 [Urine:800]    Physical Exam:   Vitals signs and nursing note reviewed. Constitutional:       General: She is not in acute distress. Appearance: She is ill-appearing. She is not diaphoretic. HENT: Nasal O2 at 3L/min   Neck:      Musculoskeletal: Neck supple. Cardiovascular:      Rate and Rhythm: Normal rate and regular rhythm. Heart sounds: No murmur. Pulmonary:      Breath sounds: No wheezing, rhonchi or rales. Abdominal:      Palpations: Abdomen is soft. Tenderness: There is no abdominal tenderness. Genitourinary:     Comments: No [place]  Musculoskeletal:      Right lower leg: No edema. Left lower leg: No edema. Skin:     Findings: No rash. Neurological:      General: No focal deficit present. Mental Status: She is alert and oriented to person, place, and time. Psychiatric:         Mood and Affect: Mood normal.         Behavior: Behavior normal.         Thought Content:  Thought content normal.         Judgment: Judgment normal.      Lab/Data Review:    WBC 6,100  Procalcitonin normal  LDH 340  Ferritin 183  CRP 10.20    Assessment:     Principal Problem:    COVID-19 (11/22/2020)    Active Problems:    Hypokalemia (11/22/2020)      Pneumonia (11/22/2020)    1. Covid-19 infection with ?pneumonitis  2. ?Asthma exacerbation  3. Acute hypoxic respiratory failure     Comment:  Some question here whether her respiratory condition is due to Covid-19 or asthma, or both. Elevated LDH and CRP support diagnosis of Covid-19. Plan:      1. Continue Zithromax, Ceftriaxone, Remdesivir and Decadron for now  2. Awaiting CT Chest without contrast  3. In am, repeat CRP, LDH  4.  Hold off on Actemra with normal ferritin    Signed By: Sharon Cevallos MD     November 24, 2020

## 2020-11-24 NOTE — PROGRESS NOTES
Reason for Admission:   COVID pneumonia. RUR Score:   14%                  Plan for utilizing home health:  Patient agreeable to having [facility] or going to rehab if needed at discharge. PCP: First and Last name:  Dr. Robin Guo, but she saw [employer] NP     Name of Practice:      Are you a current patient: Yes/No: Yes     Approximate date of last visit: was just there last Tuesday. Can you participate in a virtual visit with your PCP:                     Current Advanced Directive/Advance Care Plan: Patient states that she does not want to be resuscitated if there is no hope of recovery. Transition of Care Plan:                      Patient lives in a 1 story home with her . There are 3 steps to get into the house. She uses a walker. She states that she needs a nebulizer machine. She had a machine but can't seem to find it at the house. She is requesting another machine at discharge. Order uploaded as well as clinicals. Referral sent to [facility]. Patient agreeable and choice letter signed. She states that her doctor was suppose to send an order. CM called [facility] medical and no order noted at this time. DME order uploaded into [email or web address]. Awaiting acceptance from [facility].  Patient was made aware of order being sent. Will continue to follow for discharge needs.

## 2020-11-25 ENCOUNTER — APPOINTMENT (OUTPATIENT)
Dept: CT IMAGING | Age: 70
DRG: 177 | End: 2020-11-25
Attending: INTERNAL MEDICINE
Payer: MEDICARE

## 2020-11-25 LAB
ANION GAP SERPL CALC-SCNC: 4 MMOL/L (ref 5–15)
BUN SERPL-MCNC: 18 MG/DL (ref 6–20)
BUN/CREAT SERPL: 21 (ref 12–20)
CA-I BLD-MCNC: 9.1 MG/DL (ref 8.5–10.1)
CHLORIDE SERPL-SCNC: 100 MMOL/L (ref 97–108)
CO2 SERPL-SCNC: 31 MMOL/L (ref 21–32)
CREAT SERPL-MCNC: 0.86 MG/DL (ref 0.55–1.02)
ERYTHROCYTE [DISTWIDTH] IN BLOOD BY AUTOMATED COUNT: 13.5 % (ref 11.5–14.5)
GLUCOSE BLD STRIP.AUTO-MCNC: 255 MG/DL (ref 65–100)
GLUCOSE BLD STRIP.AUTO-MCNC: 309 MG/DL (ref 65–100)
GLUCOSE BLD STRIP.AUTO-MCNC: 343 MG/DL (ref 65–100)
GLUCOSE BLD STRIP.AUTO-MCNC: 353 MG/DL (ref 65–100)
GLUCOSE BLD STRIP.AUTO-MCNC: 368 MG/DL (ref 65–100)
GLUCOSE SERPL-MCNC: 363 MG/DL (ref 65–100)
HCT VFR BLD AUTO: 36.1 % (ref 35–47)
HGB BLD-MCNC: 11.7 G/DL (ref 11.5–16)
MAGNESIUM SERPL-MCNC: 1.7 MG/DL (ref 1.6–2.4)
MCH RBC QN AUTO: 29.6 PG (ref 26–34)
MCHC RBC AUTO-ENTMCNC: 32.4 G/DL (ref 30–36.5)
MCV RBC AUTO: 91.4 FL (ref 80–99)
PERFORMED BY, TECHID: ABNORMAL
PLATELET # BLD AUTO: 478 K/UL (ref 150–400)
PMV BLD AUTO: 9.6 FL (ref 8.9–12.9)
POTASSIUM SERPL-SCNC: 4.2 MMOL/L (ref 3.5–5.1)
RBC # BLD AUTO: 3.95 M/UL (ref 3.8–5.2)
SODIUM SERPL-SCNC: 135 MMOL/L (ref 136–145)
WBC # BLD AUTO: 7.9 K/UL (ref 3.6–11)

## 2020-11-25 PROCEDURE — 74011000250 HC RX REV CODE- 250: Performed by: INTERNAL MEDICINE

## 2020-11-25 PROCEDURE — 65270000029 HC RM PRIVATE

## 2020-11-25 PROCEDURE — 74011636637 HC RX REV CODE- 636/637: Performed by: NURSE PRACTITIONER

## 2020-11-25 PROCEDURE — 36415 COLL VENOUS BLD VENIPUNCTURE: CPT

## 2020-11-25 PROCEDURE — 85027 COMPLETE CBC AUTOMATED: CPT

## 2020-11-25 PROCEDURE — 97165 OT EVAL LOW COMPLEX 30 MIN: CPT

## 2020-11-25 PROCEDURE — 94762 N-INVAS EAR/PLS OXIMTRY CONT: CPT

## 2020-11-25 PROCEDURE — 74011250636 HC RX REV CODE- 250/636: Performed by: FAMILY MEDICINE

## 2020-11-25 PROCEDURE — 74011000258 HC RX REV CODE- 258: Performed by: FAMILY MEDICINE

## 2020-11-25 PROCEDURE — 97530 THERAPEUTIC ACTIVITIES: CPT

## 2020-11-25 PROCEDURE — 74011250637 HC RX REV CODE- 250/637: Performed by: FAMILY MEDICINE

## 2020-11-25 PROCEDURE — 74011000250 HC RX REV CODE- 250: Performed by: FAMILY MEDICINE

## 2020-11-25 PROCEDURE — 74011636637 HC RX REV CODE- 636/637: Performed by: FAMILY MEDICINE

## 2020-11-25 PROCEDURE — 80048 BASIC METABOLIC PNL TOTAL CA: CPT

## 2020-11-25 PROCEDURE — 83735 ASSAY OF MAGNESIUM: CPT

## 2020-11-25 PROCEDURE — 99232 SBSQ HOSP IP/OBS MODERATE 35: CPT | Performed by: INTERNAL MEDICINE

## 2020-11-25 PROCEDURE — 82962 GLUCOSE BLOOD TEST: CPT

## 2020-11-25 PROCEDURE — 77010033678 HC OXYGEN DAILY

## 2020-11-25 RX ORDER — INSULIN LISPRO 100 [IU]/ML
12 INJECTION, SOLUTION INTRAVENOUS; SUBCUTANEOUS
Status: DISCONTINUED | OUTPATIENT
Start: 2020-11-25 | End: 2020-11-27 | Stop reason: HOSPADM

## 2020-11-25 RX ADMIN — LATANOPROST 1 DROP: 50 SOLUTION OPHTHALMIC at 18:00

## 2020-11-25 RX ADMIN — INSULIN LISPRO 10 UNITS: 100 INJECTION, SOLUTION INTRAVENOUS; SUBCUTANEOUS at 21:42

## 2020-11-25 RX ADMIN — INSULIN LISPRO 8 UNITS: 100 INJECTION, SOLUTION INTRAVENOUS; SUBCUTANEOUS at 13:04

## 2020-11-25 RX ADMIN — Medication 10 ML: at 21:19

## 2020-11-25 RX ADMIN — INSULIN LISPRO 8 UNITS: 100 INJECTION, SOLUTION INTRAVENOUS; SUBCUTANEOUS at 10:16

## 2020-11-25 RX ADMIN — CEFTRIAXONE 1 G: 1 INJECTION, POWDER, FOR SOLUTION INTRAMUSCULAR; INTRAVENOUS at 17:04

## 2020-11-25 RX ADMIN — INSULIN LISPRO 12 UNITS: 100 INJECTION, SOLUTION INTRAVENOUS; SUBCUTANEOUS at 18:05

## 2020-11-25 RX ADMIN — INSULIN LISPRO 6 UNITS: 100 INJECTION, SOLUTION INTRAVENOUS; SUBCUTANEOUS at 10:16

## 2020-11-25 RX ADMIN — CLONIDINE HYDROCHLORIDE 0.2 MG: 0.1 TABLET ORAL at 21:15

## 2020-11-25 RX ADMIN — DORZOLAMIDE HYDROCHLORIDE 1 DROP: 20 SOLUTION/ DROPS OPHTHALMIC at 21:18

## 2020-11-25 RX ADMIN — Medication 10 ML: at 13:05

## 2020-11-25 RX ADMIN — BRIMONIDINE TARTRATE 1 DROP: 2 SOLUTION OPHTHALMIC at 09:00

## 2020-11-25 RX ADMIN — AZITHROMYCIN 500 MG: 500 INJECTION, POWDER, LYOPHILIZED, FOR SOLUTION INTRAVENOUS at 17:03

## 2020-11-25 RX ADMIN — INSULIN LISPRO 8 UNITS: 100 INJECTION, SOLUTION INTRAVENOUS; SUBCUTANEOUS at 11:30

## 2020-11-25 RX ADMIN — INSULIN LISPRO 8 UNITS: 100 INJECTION, SOLUTION INTRAVENOUS; SUBCUTANEOUS at 18:05

## 2020-11-25 RX ADMIN — CLONIDINE HYDROCHLORIDE 0.2 MG: 0.1 TABLET ORAL at 10:17

## 2020-11-25 RX ADMIN — SODIUM CHLORIDE 100 MG: 9 INJECTION, SOLUTION INTRAVENOUS at 21:43

## 2020-11-25 RX ADMIN — BRIMONIDINE TARTRATE 1 DROP: 2 SOLUTION OPHTHALMIC at 21:18

## 2020-11-25 RX ADMIN — AMLODIPINE BESYLATE 10 MG: 5 TABLET ORAL at 10:17

## 2020-11-25 RX ADMIN — ENOXAPARIN SODIUM 40 MG: 40 INJECTION SUBCUTANEOUS at 10:17

## 2020-11-25 RX ADMIN — DORZOLAMIDE HYDROCHLORIDE 1 DROP: 20 SOLUTION/ DROPS OPHTHALMIC at 09:00

## 2020-11-25 RX ADMIN — BENZONATATE 100 MG: 100 CAPSULE ORAL at 10:17

## 2020-11-25 RX ADMIN — TIMOLOL MALEATE 1 DROP: 5 SOLUTION/ DROPS OPHTHALMIC at 09:00

## 2020-11-25 RX ADMIN — DEXAMETHASONE SODIUM PHOSPHATE 6 MG: 4 INJECTION, SOLUTION INTRAMUSCULAR; INTRAVENOUS at 10:24

## 2020-11-25 RX ADMIN — Medication 10 ML: at 06:05

## 2020-11-25 RX ADMIN — LISINOPRIL 20 MG: 20 TABLET ORAL at 10:24

## 2020-11-25 RX ADMIN — ATORVASTATIN CALCIUM 10 MG: 10 TABLET, FILM COATED ORAL at 21:15

## 2020-11-25 RX ADMIN — ATENOLOL 50 MG: 50 TABLET ORAL at 10:24

## 2020-11-25 NOTE — PROGRESS NOTES
Hospitalist Progress Note    Subjective:   Daily Progress Note: 11/25/2020 12:46 PM    Hospital Course:  Lula Mccormack a 65 y. o. female with past medical history of asthma, non-insulin-dependent diabetes mellitus type 2, hypertension and hyperlipidemia presenting to the ER with complaints of shortness of breath, cough, lightheadedness, weakness, dizziness, chills, fatigue and nasal congestion.  Ms. Kitty Gao was found positive for COVID-19 on November 17, 2020.  Since diagnosis, symptoms have progressively worsened over the past several days.  Ms. Kitty Gao has not used her albuterol inhaler for symptoms. Ms. Kitty Gao was prescribed azithromycin on November 17th and has taken as prescribed. She presents this evening for further treatment and evaluation. Subjective: Pt seen in room, sitting up in chair, still on supplemental oxygen, mild dyspnea.       Current Facility-Administered Medications   Medication Dose Route Frequency    latanoprost (XALATAN) 0.005 % ophthalmic solution 1 Drop  1 Drop Left Eye QPM    timolol (TIMOPTIC) 0.5 % ophthalmic solution 1 Drop  1 Drop Left Eye DAILY    dorzolamide (TRUSOPT) 2 % ophthalmic solution 1 Drop  1 Drop Left Eye BID    brimonidine (ALPHAGAN) 0.2 % ophthalmic solution 1 Drop  1 Drop Left Eye BID    insulin lispro (HUMALOG) injection 8 Units  8 Units SubCUTAneous TIDAC    melatonin tablet 3 mg  3 mg Oral QHS PRN    benzonatate (TESSALON) capsule 100 mg  100 mg Oral TID PRN    azithromycin (ZITHROMAX) 500 mg in 0.9% sodium chloride 250 mL (VIAL-MATE)  500 mg IntraVENous Q24H    cefTRIAXone (ROCEPHIN) 1 g in 0.9% sodium chloride (MBP/ADV) 50 mL MBP  1 g IntraVENous Q24H    albuterol (PROVENTIL HFA, VENTOLIN HFA, PROAIR HFA) inhaler 2 Puff  2 Puff Inhalation Q4H PRN    insulin lispro (HUMALOG) injection   SubCUTAneous AC&HS    glucose chewable tablet 16 g  4 Tab Oral PRN    glucagon (GLUCAGEN) injection 1 mg  1 mg IntraMUSCular PRN    dextrose (D50W) injection syrg 12.5-25 g  25-50 mL IntraVENous PRN    sodium chloride (NS) flush 5-40 mL  5-40 mL IntraVENous Q8H    sodium chloride (NS) flush 5-40 mL  5-40 mL IntraVENous PRN    acetaminophen (TYLENOL) tablet 650 mg  650 mg Oral Q6H PRN    Or    acetaminophen (TYLENOL) suppository 650 mg  650 mg Rectal Q6H PRN    polyethylene glycol (MIRALAX) packet 17 g  17 g Oral DAILY PRN    promethazine (PHENERGAN) tablet 12.5 mg  12.5 mg Oral Q6H PRN    Or    ondansetron (ZOFRAN) injection 4 mg  4 mg IntraVENous Q6H PRN    enoxaparin (LOVENOX) injection 40 mg  40 mg SubCUTAneous DAILY    dexamethasone (DECADRON) 4 mg/mL injection 6 mg  6 mg IntraVENous DAILY    remdesivir 100 mg in 0.9% sodium chloride 250 mL IVPB  100 mg IntraVENous Q24H    amLODIPine (NORVASC) tablet 10 mg  10 mg Oral DAILY    atenoloL (TENORMIN) tablet 50 mg  50 mg Oral DAILY    cloNIDine HCL (CATAPRES) tablet 0.2 mg  0.2 mg Oral BID    lisinopriL (PRINIVIL, ZESTRIL) tablet 20 mg  20 mg Oral DAILY    atorvastatin (LIPITOR) tablet 10 mg  10 mg Oral QHS        Review of Systems:    Review of Systems   Constitutional: Negative for fever and malaise/fatigue. HENT: Negative for congestion and sore throat. Respiratory: Positive for shortness of breath. Negative for cough and sputum production. Cardiovascular: Negative for chest pain and palpitations. Gastrointestinal: Negative for heartburn, nausea and vomiting. Genitourinary: Negative for dysuria and frequency. Neurological: Negative for dizziness, weakness and headaches. Objective:     Visit Vitals  BP (!) 156/74 (BP 1 Location: Left arm, BP Patient Position: At rest)   Pulse (!) 54   Temp 98.2 °F (36.8 °C)   Resp 18   Ht 5' 6\" (1.676 m)   Wt 104.3 kg (230 lb)   SpO2 97%   BMI 37.12 kg/m²    O2 Flow Rate (L/min): 2 l/min O2 Device: Nasal cannula    Temp (24hrs), Av.9 °F (36.6 °C), Min:97.5 °F (36.4 °C), Max:98.3 °F (36.8 °C)      No intake/output data recorded.   1901 -  0700  In: 1100 [I.V.:1100]  Out: -     PHYSICAL EXAM:    Physical Exam   Constitutional: She is oriented to person, place, and time. She appears well-developed. No distress. Neck: Normal range of motion. Neck supple. Cardiovascular: Normal rate, regular rhythm, normal heart sounds and intact distal pulses. Pulmonary/Chest: Effort normal. No respiratory distress. She has wheezes. Abdominal: Soft. Bowel sounds are normal.   Musculoskeletal: Normal range of motion. Neurological: She is alert and oriented to person, place, and time. Skin: Skin is warm and dry. Psychiatric: She has a normal mood and affect. Her behavior is normal.          Data Review    Recent Results (from the past 24 hour(s))   GLUCOSE, POC    Collection Time: 11/24/20  4:11 PM   Result Value Ref Range    Glucose (POC) 360 (H) 65 - 100 mg/dL    Performed by Nat Montague    GLUCOSE, POC    Collection Time: 11/24/20  9:49 PM   Result Value Ref Range    Glucose (POC) 290 (H) 65 - 100 mg/dL    Performed by [address], POC    Collection Time: 11/25/20  9:39 AM   Result Value Ref Range    Glucose (POC) 255 (H) 65 - 100 mg/dL    Performed by Kenny Montelongo    GLUCOSE, POC    Collection Time: 11/25/20 11:50 AM   Result Value Ref Range    Glucose (POC) 343 (H) 65 - 100 mg/dL    Performed by Kenny Montelongo        XR CHEST SNGL V   Final Result      CT CHEST WO CONT    (Results Pending)       Principal Problem:    COVID-19 (11/22/2020)    Active Problems:    Hypokalemia (11/22/2020)      Pneumonia (11/22/2020)        Assessment/Plan:     1. Covid 19 infection/pneumonia with hypoxia- will consult pulmonary, ID following,  Wean O2 as tolerated, IV decadron, zithromax, IV remedesivir, CT chest could not be done due to pt orthopnea/SOB. Will get overnight pulse oximetry prior to discharge if unable to wean O2.      2. History of asthma- prn albuterol,      3.Hypertension- controlled, continue home medications     4.  DM II- s/s coverage, increase tid mealtime insulin      DVT Prophylaxis: lovenox  Code Status:  Full Code  POA:    Care Plan discussed with:   ____patient, staff nurse, pulmonary___________________________________________________________    Silvia Robertson NP

## 2020-11-25 NOTE — PROGRESS NOTES
Problem: Pneumonia: Day 3  Goal: Activity/Safety  Outcome: Progressing Towards Goal  Note: Bed is in the lowest position and wheels are locked, call bell is within reach, bathroom light is on during evening hours, gripper socks are on and patient has been instructed to call out for assistance if needed. As of now, patient is free from falls and will continue to be monitored. Goal: Nutrition/Diet  Outcome: Progressing Towards Goal  Note: Patient is tolerating her diet and reports no nausea  Goal: Respiratory  Outcome: Progressing Towards Goal  Note: Pt is on 3L NC and her O2 saturations are remaining above 90%    Bedside shift change report given to Avi Dhaliwal [facility] (oncoming nurse) by Terrance Land RN (offgoing nurse).  Report included the following information SBAR, Kardex, Intake/Output, MAR and Cardiac Rhythm NSR/Sinus Brii Tamiko

## 2020-11-25 NOTE — PROGRESS NOTES
OCCUPATIONAL THERAPY EVALUATION  Patient: Raoul Lesch (64 y.o. female)  Date: 11/25/2020  Primary Diagnosis: COVID-19 [U07.1]  Pneumonia [J18.9]  Hypokalemia [E87.6]        Precautions: COVID positive,  Fall    ASSESSMENT  Based on the objective data described below, the patient presents with no c/o SOB with activity, decreased activity tolerance, decreased standing balance, new O2 use of 2L via nasal cannula, increased need for A with self care and functional mobility/transfers. Patient semi supine in bed upon OT arrival and agreeable to working with therapy. Patient A&O x4 and per pt report, pt lives with  in a single story home with 3 TEN and pan handrails. Patient reports using RW for ambulation at baseline, no other reported DME in the home. Patient independent for ADLs and IADLs PTA, including community mobility. Patient SBA bed mobility, sup <> sit, mod A LE dressing EOB to don socks, CGA sit <> stand and bed to chair transfer. Patient declining toileting needs at this time, setup assist for grooming sitting in recliner to brush teeth. Patient would benefit from continued skilled OT services to address above deficits and improve safety and independence with self care and functional mobility/transfers. Recommend discharge to home with [facility] when medically appropriate. Current Level of Function Impacting Discharge (ADLs/self-care): requiring increased A for LE ADLs. Other factors to consider for discharge: family support, time since onset, new O2 use        PLAN :  Recommendations and Planned Interventions: self care training, functional mobility training, therapeutic exercise, balance training, therapeutic activities, endurance activities, patient education, home safety training, and family training/education    Frequency/Duration: Patient will be followed by occupational therapy 4 times a week to address goals.     Recommendation for discharge: (in order for the patient to meet his/her long term goals)  OT    This discharge recommendation:  Has been made in collaboration with the attending provider and/or case management    IF patient discharges home will need the following DME: shower chair       SUBJECTIVE:   Patient stated I like sitting up in the chair.     OBJECTIVE DATA SUMMARY:   HISTORY:   Past Medical History:   Diagnosis Date    Acute sinusitis 11/13/2020    Arthritis     Cyst of ovary 11/13/2020    Essential (primary) hypertension 9/26/2017    Glaucoma 11/30/2018    Gout 8/9/2019    Hypercholesterolemia 11/13/2020    Impacted cerumen 11/13/2020    Mild intermittent asthma, uncomplicated 9/75/0923    Mixed hyperlipidemia due to type 2 diabetes mellitus ([address] 9/26/2017    Painless rectal bleeding 11/30/2018    Type 2 diabetes mellitus without complications ([facility] [address] 0/87/8652     Past Surgical History:   Procedure Laterality Date    HX COLONOSCOPY      HX HERNIA REPAIR  Unknown date    HX KNEE ARTHROSCOPY Right 01/01/2013    HX OTHER SURGICAL  05/16/2017    Eye surgery procedure    HX PARTIAL HYSTERECTOMY  Unknown date       Expanded or extensive additional review of patient history:     Home Situation  Home Environment: Private residence  # Steps to Enter: 3  Rails to Enter: Yes  Hand Rails : Bilateral  One/Two Story Residence: One story  Living Alone: No  Support Systems: Spouse/Significant Other/Partner  Patient Expects to be Discharged to[de-identified] Private residence  Current DME Used/Available at Home: Walker, rolling    PLOF: Pt I for ADLS/IADLS, mod I with mobility prior to admission. EXAMINATION OF PERFORMANCE DEFICITS:  Cognitive/Behavioral Status:  Neurologic State: Alert  Orientation Level: Oriented X4  Cognition: Follows commands  Safety/Judgement: Good awareness of safety precautions    Skin: no breakdown noted    Edema: none noted    Hearing:   Auditory  Auditory Impairment: None    Vision/Perceptual:    No deficits noted    Range of Motion:  WFL    Strength:  WFL    Tone & Sensation:  Tone: normal  Sensation: intact    Balance:  Sitting: Intact; Without support  Standing: Intact; With support    Functional Mobility and Transfers for ADLs:  Bed Mobility:  Rolling: Stand-by assistance  Supine to Sit: Stand-by assistance  Scooting: Stand-by assistance    Transfers:  Sit to Stand: Contact guard assistance  Stand to Sit: Contact guard assistance  Bed to Chair: Contact guard assistance  Toilet Transfer : Contact guard assistance(simulated BSC)  Assistive Device : Gait Belt;Walker, rolling    ADL Assessment:    Oral Facial Hygiene/Grooming: Setup    Lower Body Dressing: Moderate assistance    ADL Intervention and task modifications:    Grooming  Grooming Assistance: Set-up  Position Performed: Seated in chair  Brushing Teeth: Set-up    Lower Body Dressing Assistance  Dressing Assistance: Moderate assistance  Socks: Moderate assistance  Leg Crossed Method Used: No  Position Performed: Seated edge of bed    Cognitive Retraining  Safety/Judgement: Good awareness of safety precautions    Therapeutic Exercise:  Patient instructed on general AROM to complete throughout day     Functional Measure:    [address] [place] [id number] AM-PACTM \"6 Clicks\"                                                       Daily Activity Inpatient Short Form  How much help from another person does the patient currently need. .. Total; A Lot A Little None   1. Putting on and taking off regular lower body clothing? []  1 [x]  2 []  3 []  4   2. Bathing (including washing, rinsing, drying)? []  1 [x]  2 []  3 []  4   3. Toileting, which includes using toilet, bedpan or urinal? [] 1 []  2 [x]  3 []  4   4. Putting on and taking off regular upper body clothing? []  1 []  2 [x]  3 []  4   5. Taking care of personal grooming such as brushing teeth? []  1 []  2 [x]  3 []  4   6. Eating meals? []  1 []  2 []  3 [x]  4   © 2007, Trustees of [address] [place] [address] [address] [address], under license to [employer].  All rights reserved     Score: 17/24     Interpretation of Tool: Represents clinically-significant functional categories (i.e. Activities of daily living). Percentage of Impairment CH    0%   CI    1-19% CJ    20-39% CK    40-59% CL    60-79% CM    80-99% CN     100%   [facility]  Score 6-24 24 23 20-22 15-19 10-14 7-9 6        Occupational Therapy Evaluation Charge Determination   History Examination Decision-Making   LOW Complexity : Brief history review  LOW Complexity : 1-3 performance deficits relating to physical, cognitive , or psychosocial skils that result in activity limitations and / or participation restrictions  LOW Complexity : No comorbidities that affect functional and no verbal or physical assistance needed to complete eval tasks       Based on the above components, the patient evaluation is determined to be of the following complexity level: LOW     Pain Ratin/10    Activity Tolerance:   Fair, desaturates with exertion and requires oxygen, and requires rest breaks    After treatment patient left in no apparent distress:    Sitting in chair, Call bell within reach, and nurse and nurse aide notified    COMMUNICATION/EDUCATION:   The patients plan of care was discussed with: Registered nurse and Certified nursing assistant/patient care technician. Home safety education was provided and the patient/caregiver indicated understanding., Patient/family have participated as able in goal setting and plan of care. , and Patient/family agree to work toward stated goals and plan of care. This patients plan of care is appropriate for delegation to [facility].     Problem: Self Care Deficits Care Plan (Adult)  Goal: *Acute Goals and Plan of Care (Insert Text)  Description: Pt will be mod I sup <> sit in prep for EOB ADLs  Pt will be mod I grooming standing at sink LRAD  Pt will be mod I LE dressing sitting EOB/long sit  Pt will be mod I sit <>  prep for toileting LRAD  Pt will be mod I toileting/toilet transfer/cloth mgmt LRAD  Pt will be I following UE HEP in prep for self care tasks     Outcome: Not Met    Thank you for this referral.  Emelia Martinez, OTR/L  Time Calculation: 21 mins

## 2020-11-25 NOTE — PROGRESS NOTES
Progress Note    Patient: Naomi Kenny MRN: [id number]  SSN: [id number]    YOB: 1950  Age: 65 y.o. Sex: female      Admit Date: 11/22/2020    LOS: 3 days     Subjective:   Patient followed for Covid-19 infection and respiratory failure with underlying history of asthma. CT Chest was not done because patient unable to lie flat. She remains afebrile. LDH and CRP repeat labs pending. No cultures. She is currently on Zithromax, Decadron and Remdesivir. Down to 2L nasal O2. Objective:     Vitals:    11/25/20 0324 11/25/20 0915 11/25/20 0949 11/25/20 1157   BP: (!) 152/83  (!) 174/90 (!) 156/74   Pulse: (!) 50  85 (!) 54   Resp: 18  18 18   Temp: 97.8 °F (36.6 °C)  98.3 °F (36.8 °C) 98.2 °F (36.8 °C)   SpO2: 98% 96% 96% 97%   Weight:       Height:            Intake and Output:  Current Shift: No intake/output data recorded. Last three shifts: 11/23 1901 - 11/25 0700  In: 1100 [I.V.:1100]  Out: -     Physical Exam:   Vitals signs and nursing note reviewed. Constitutional:       General: She is not in acute distress. Appearance: She is ill-appearing. She is not diaphoretic. HENT: Nasal O2 at 2L/min   Neck:      Musculoskeletal: Neck supple. Cardiovascular:      Rate and Rhythm: Normal rate and regular rhythm. Heart sounds: No murmur. Pulmonary:      Breath sounds: No wheezing, rhonchi or rales. Abdominal:      Palpations: Abdomen is soft. Tenderness: There is no abdominal tenderness. Genitourinary:     Comments: No [place]  Musculoskeletal:      Right lower leg: No edema. Left lower leg: No edema. Skin:     Findings: No rash. Neurological:      General: No focal deficit present. Mental Status: She is alert and oriented to person, place, and time. Psychiatric:         Mood and Affect: Mood normal.         Behavior: Behavior normal.         Thought Content:  Thought content normal.         Judgment: Judgment normal.      Lab/Data Review:    WBC 6,100  Procalcitonin normal    Ferritin 183  CRP 10.20    Assessment:     Principal Problem:    COVID-19 (11/22/2020)    Active Problems:    Hypokalemia (11/22/2020)      Pneumonia (11/22/2020)    1. Covid-19 infection with ?pneumonitis, Day #3 IV Remdesivir, Azithomycin, Ceftriaxone, Decadron  2. ?Asthma exacerbation  3. Acute hypoxic respiratory failure     Comment:  Oxygenation improving. Plan:      1. Continue Zithromax, Ceftriaxone, Remdesivir and Decadron for now  2. In am, repeat CRP, LDH  3.  Hold off on Actemra given normal ferritin    Signed By: Eddie Melo MD     November 25, 2020

## 2020-11-25 NOTE — PROGRESS NOTES
Pulmonary and Critical Care progress note    Subjective:         Chief Complaint:   Chief Complaint   Patient presents with    Shortness of Breath    Concern For COVID-19 (Coronavirus)        Patient seen and examined  Overnight events noted    Lying in the recliner comfortably  On nasal cannula oxygen at 3 L  Adequate saturations  No tachypnea  Awake and alert    Review of Systems:  A comprehensive review of systems was negative except for that written in the HPI.      Current Facility-Administered Medications   Medication Dose Route Frequency Provider Last Rate Last Dose    insulin lispro (HUMALOG) injection 12 Units  12 Units SubCUTAneous TIDAC Amanuel Baldwin NP        latanoprost (XALATAN) 0.005 % ophthalmic solution 1 Drop  1 Drop Left Eye QPM Rosy Evangelista MD   1 Drop at 11/24/20 2155    timolol (TIMOPTIC) 0.5 % ophthalmic solution 1 Drop  1 Drop Left Eye DAILY Rosy Evangelista MD   1 Drop at 11/25/20 0900    dorzolamide (TRUSOPT) 2 % ophthalmic solution 1 Drop  1 Drop Left Eye BID Rosy Evangelista MD   1 Drop at 11/25/20 0900    brimonidine (ALPHAGAN) 0.2 % ophthalmic solution 1 Drop  1 Drop Left Eye BID Rosy Evangelista MD   1 Drop at 11/25/20 0900    melatonin tablet 3 mg  3 mg Oral QHS PRN Sanam Sommers NP   3 mg at 11/24/20 2153    benzonatate (TESSALON) capsule 100 mg  100 mg Oral TID PRN Dayna Brunson MD   100 mg at 11/25/20 1017    azithromycin (ZITHROMAX) 500 mg in 0.9% sodium chloride 250 mL (VIAL-MATE)  500 mg IntraVENous Q24H Asa MANCILLA MD   500 mg at 11/24/20 1611    cefTRIAXone (ROCEPHIN) 1 g in 0.9% sodium chloride (MBP/ADV) 50 mL MBP  1 g IntraVENous Q24H Asa MANCILLA  mL/hr at 11/24/20 1612 1 g at 11/24/20 1612    albuterol (PROVENTIL HFA, VENTOLIN HFA, PROAIR HFA) inhaler 2 Puff  2 Puff Inhalation Q4H PRN Dayna Brunson MD        insulin lispro (HUMALOG) injection   SubCUTAneous AC&HS Dayna Brunson MD   8 Units at 11/25/20 1304    glucose chewable tablet 16 g  4 Tab Oral PRN Sue Albarran MD        glucagon (GLUCAGEN) injection 1 mg  1 mg IntraMUSCular PRN Sue Albarran MD        dextrose (D50W) injection syrg 12.5-25 g  25-50 mL IntraVENous PRN Sue Albarran MD        sodium chloride (NS) flush 5-40 mL  5-40 mL IntraVENous Q8H Sue Albarran MD   10 mL at 11/25/20 1305    sodium chloride (NS) flush 5-40 mL  5-40 mL IntraVENous PRN Sue Albarran MD        acetaminophen (TYLENOL) tablet 650 mg  650 mg Oral Q6H PRN Sue Albarran MD   650 mg at 11/23/20 2142    Or    acetaminophen (TYLENOL) suppository 650 mg  650 mg Rectal Q6H PRN Sue Albarran MD        polyethylene glycol (MIRALAX) packet 17 g  17 g Oral DAILY PRN Sue Albarran MD        promethazine (PHENERGAN) tablet 12.5 mg  12.5 mg Oral Q6H PRN Sue Albarran MD        Or    ondansetron [facility]) injection 4 mg  4 mg IntraVENous Q6H PRN Sue Albarran MD        enoxaparin (LOVENOX) injection 40 mg  40 mg SubCUTAneous DAILY Sue Albarran MD   40 mg at 11/25/20 1017    dexamethasone (DECADRON) 4 mg/mL injection 6 mg  6 mg IntraVENous DAILY Sue Albarran MD   6 mg at 11/25/20 1024    remdesivir 100 mg in 0.9% sodium chloride 250 mL IVPB  100 mg IntraVENous Q24H Doug MANCILLA MD   100 mg at 11/24/20 2141    amLODIPine (NORVASC) tablet 10 mg  10 mg Oral DAILY Sue Albarran MD   10 mg at 11/25/20 1017    atenoloL (TENORMIN) tablet 50 mg  50 mg Oral DAILY Sue Albarran MD   50 mg at 11/25/20 1024    cloNIDine HCL (CATAPRES) tablet 0.2 mg  0.2 mg Oral BID Doug MANCILLA MD   0.2 mg at 11/25/20 1017    lisinopriL (PRINIVIL, ZESTRIL) tablet 20 mg  20 mg Oral DAILY Sue Albarran MD   20 mg at 11/25/20 1024    atorvastatin (LIPITOR) tablet 10 mg  10 mg Oral QHS Sue Albarran MD   10 mg at 11/24/20 2145          Allergies   Allergen Reactions    Aspirin Shortness of Breath     Respiratory distress    Codeine Vertigo Objective:     Blood pressure (!) 156/74, pulse (!) 54, temperature 98.2 °F (36.8 °C), resp. rate 18, height 5' 6\" (1.676 m), weight 104.3 kg (230 lb), SpO2 97 %. Temp (24hrs), Av.9 °F (36.6 °C), Min:97.5 °F (36.4 °C), Max:98.3 °F (36.8 °C)      Intake and Output:  Current Shift: No intake/output data recorded. Last 3 Shifts: 1901 -  0700  In: 1100 [I.V.:1100]  Out: -     Physical Exam:     General: Lying in bed comfortably, no acute distress, obese  Eye: Reactive, symmetric  Throat and Neck: Supple  Lung: Reduced air entry bilaterally with prolonged exhalation but no wheezing. Occasional crackles. Heart: S1+S2. No murmurs  Abdomen: soft, non-tender. Bowel sounds normal. No masses; obese  Extremities: No edema  : Not done  Skin: No cyanosis  Neurologic: A & O x3. Grossly nonfocal  Psychiatric: Appropriate affect; coherent      Lab/Data Review:    Recent Results (from the past 24 hour(s))   GLUCOSE, POC    Collection Time: 20  4:11 PM   Result Value Ref Range    Glucose (POC) 360 (H) 65 - 100 mg/dL    Performed by Jonathan Estrella    GLUCOSE, POC    Collection Time: 20  9:49 PM   Result Value Ref Range    Glucose (POC) 290 (H) 65 - 100 mg/dL    Performed by [address], POC    Collection Time: 20  9:39 AM   Result Value Ref Range    Glucose (POC) 255 (H) 65 - 100 mg/dL    Performed by Cale Maurice    GLUCOSE, POC    Collection Time: 20 11:50 AM   Result Value Ref Range    Glucose (POC) 343 (H) 65 - 100 mg/dL    Performed by Ida ALFRED        XR CHEST SNGL V   Final Result      CT CHEST WO CONT    (Results Pending)       CT Results  (Last 48 hours)    None            Assessment:     1. Acute respiratory failure with hypoxia  2. COVID-19 pneumonia  3. Acute exacerbation of COPD  4. Ex-smoker  5. Hypokalemia  6. Shortness of breath  7.   Cough    Plan:     Patient admitted to the hospital  Will be watched here closely    Nasal cannula oxygen as needed to keep saturation above 92%  Currently 3 L will cannula oxygen    Currently on remdesivir  Continues on Decadron 6 mg daily  Nebulizers on hold given patient's Covid positive status  Continue albuterol    Likely element of COPD exacerbation  This should be covered with Decadron and inhalers  Continue azithromycin and Rocephin for antibiotic coverage    Follow clinically  Await CT chest, not done yesterday since patient unable to lie flat    DVT and GI prophylaxis    Questions of patient were answered at bedside in detail  Case discussed in detail with RN, RT, and care team    Thank you for involving me in the care of this patient  I will follow with you closely during hospitalization    Darrius Damico MD  [facility]  11/25/2020  3:19 PM

## 2020-11-26 LAB
CRP SERPL-MCNC: 1.74 MG/DL (ref 0–0.6)
GLUCOSE BLD STRIP.AUTO-MCNC: 246 MG/DL (ref 65–100)
GLUCOSE BLD STRIP.AUTO-MCNC: 299 MG/DL (ref 65–100)
GLUCOSE BLD STRIP.AUTO-MCNC: 375 MG/DL (ref 65–100)
GLUCOSE BLD STRIP.AUTO-MCNC: 389 MG/DL (ref 65–100)
GLUCOSE BLD STRIP.AUTO-MCNC: 437 MG/DL (ref 65–100)
LDH SERPL L TO P-CCNC: 306 U/L (ref 81–246)
PERFORMED BY, TECHID: ABNORMAL

## 2020-11-26 PROCEDURE — 97530 THERAPEUTIC ACTIVITIES: CPT

## 2020-11-26 PROCEDURE — 65270000029 HC RM PRIVATE

## 2020-11-26 PROCEDURE — 36415 COLL VENOUS BLD VENIPUNCTURE: CPT

## 2020-11-26 PROCEDURE — 74011250636 HC RX REV CODE- 250/636: Performed by: INTERNAL MEDICINE

## 2020-11-26 PROCEDURE — 74011000258 HC RX REV CODE- 258: Performed by: INTERNAL MEDICINE

## 2020-11-26 PROCEDURE — 99232 SBSQ HOSP IP/OBS MODERATE 35: CPT | Performed by: INTERNAL MEDICINE

## 2020-11-26 PROCEDURE — 74011636637 HC RX REV CODE- 636/637: Performed by: FAMILY MEDICINE

## 2020-11-26 PROCEDURE — 83615 LACTATE (LD) (LDH) ENZYME: CPT

## 2020-11-26 PROCEDURE — 82962 GLUCOSE BLOOD TEST: CPT

## 2020-11-26 PROCEDURE — 74011000250 HC RX REV CODE- 250: Performed by: INTERNAL MEDICINE

## 2020-11-26 PROCEDURE — 74011000258 HC RX REV CODE- 258: Performed by: FAMILY MEDICINE

## 2020-11-26 PROCEDURE — 74011250636 HC RX REV CODE- 250/636: Performed by: FAMILY MEDICINE

## 2020-11-26 PROCEDURE — 86140 C-REACTIVE PROTEIN: CPT

## 2020-11-26 PROCEDURE — 94762 N-INVAS EAR/PLS OXIMTRY CONT: CPT

## 2020-11-26 PROCEDURE — 74011636637 HC RX REV CODE- 636/637: Performed by: NURSE PRACTITIONER

## 2020-11-26 PROCEDURE — 97161 PT EVAL LOW COMPLEX 20 MIN: CPT

## 2020-11-26 PROCEDURE — 74011250637 HC RX REV CODE- 250/637: Performed by: FAMILY MEDICINE

## 2020-11-26 RX ORDER — LISINOPRIL 40 MG/1
40 TABLET ORAL DAILY
Status: DISCONTINUED | OUTPATIENT
Start: 2020-11-27 | End: 2020-11-27 | Stop reason: HOSPADM

## 2020-11-26 RX ORDER — FUROSEMIDE 10 MG/ML
40 INJECTION INTRAMUSCULAR; INTRAVENOUS ONCE
Status: COMPLETED | OUTPATIENT
Start: 2020-11-26 | End: 2020-11-26

## 2020-11-26 RX ORDER — AZITHROMYCIN 500 MG/1
500 TABLET, FILM COATED ORAL DAILY
Status: DISCONTINUED | OUTPATIENT
Start: 2020-11-27 | End: 2020-11-27 | Stop reason: HOSPADM

## 2020-11-26 RX ADMIN — INSULIN LISPRO 12 UNITS: 100 INJECTION, SOLUTION INTRAVENOUS; SUBCUTANEOUS at 12:23

## 2020-11-26 RX ADMIN — DORZOLAMIDE HYDROCHLORIDE 1 DROP: 20 SOLUTION/ DROPS OPHTHALMIC at 20:17

## 2020-11-26 RX ADMIN — AMLODIPINE BESYLATE 10 MG: 5 TABLET ORAL at 10:41

## 2020-11-26 RX ADMIN — DORZOLAMIDE HYDROCHLORIDE 1 DROP: 20 SOLUTION/ DROPS OPHTHALMIC at 09:00

## 2020-11-26 RX ADMIN — INSULIN LISPRO 6 UNITS: 100 INJECTION, SOLUTION INTRAVENOUS; SUBCUTANEOUS at 11:30

## 2020-11-26 RX ADMIN — CLONIDINE HYDROCHLORIDE 0.2 MG: 0.1 TABLET ORAL at 10:41

## 2020-11-26 RX ADMIN — INSULIN LISPRO 12 UNITS: 100 INJECTION, SOLUTION INTRAVENOUS; SUBCUTANEOUS at 10:40

## 2020-11-26 RX ADMIN — INSULIN LISPRO 12 UNITS: 100 INJECTION, SOLUTION INTRAVENOUS; SUBCUTANEOUS at 18:19

## 2020-11-26 RX ADMIN — BRIMONIDINE TARTRATE 1 DROP: 2 SOLUTION OPHTHALMIC at 20:17

## 2020-11-26 RX ADMIN — Medication 10 ML: at 14:23

## 2020-11-26 RX ADMIN — CEFTRIAXONE 1 G: 1 INJECTION, POWDER, FOR SOLUTION INTRAMUSCULAR; INTRAVENOUS at 18:19

## 2020-11-26 RX ADMIN — ATENOLOL 50 MG: 50 TABLET ORAL at 10:42

## 2020-11-26 RX ADMIN — LISINOPRIL 20 MG: 20 TABLET ORAL at 10:43

## 2020-11-26 RX ADMIN — SODIUM CHLORIDE 100 MG: 9 INJECTION, SOLUTION INTRAVENOUS at 20:14

## 2020-11-26 RX ADMIN — LATANOPROST 1 DROP: 50 SOLUTION OPHTHALMIC at 18:00

## 2020-11-26 RX ADMIN — ENOXAPARIN SODIUM 40 MG: 40 INJECTION SUBCUTANEOUS at 10:43

## 2020-11-26 RX ADMIN — ATORVASTATIN CALCIUM 10 MG: 10 TABLET, FILM COATED ORAL at 22:42

## 2020-11-26 RX ADMIN — TIMOLOL MALEATE 1 DROP: 5 SOLUTION/ DROPS OPHTHALMIC at 09:00

## 2020-11-26 RX ADMIN — Medication 10 ML: at 22:43

## 2020-11-26 RX ADMIN — INSULIN LISPRO 12 UNITS: 100 INJECTION, SOLUTION INTRAVENOUS; SUBCUTANEOUS at 10:56

## 2020-11-26 RX ADMIN — INSULIN LISPRO 8 UNITS: 100 INJECTION, SOLUTION INTRAVENOUS; SUBCUTANEOUS at 16:30

## 2020-11-26 RX ADMIN — BRIMONIDINE TARTRATE 1 DROP: 2 SOLUTION OPHTHALMIC at 09:00

## 2020-11-26 RX ADMIN — INSULIN LISPRO 10 UNITS: 100 INJECTION, SOLUTION INTRAVENOUS; SUBCUTANEOUS at 22:42

## 2020-11-26 RX ADMIN — DEXAMETHASONE SODIUM PHOSPHATE 6 MG: 4 INJECTION, SOLUTION INTRAMUSCULAR; INTRAVENOUS at 10:41

## 2020-11-26 RX ADMIN — Medication 10 ML: at 06:19

## 2020-11-26 RX ADMIN — CLONIDINE HYDROCHLORIDE 0.2 MG: 0.1 TABLET ORAL at 20:18

## 2020-11-26 RX ADMIN — FUROSEMIDE 40 MG: 10 INJECTION, SOLUTION INTRAMUSCULAR; INTRAVENOUS at 12:23

## 2020-11-26 NOTE — PROGRESS NOTES
Pulmonary and Critical Care progress note    Subjective:         Chief Complaint:   Chief Complaint   Patient presents with    Shortness of Breath    Concern For COVID-19 (Coronavirus)        Patient seen and examined  Overnight events noted    Lying in the recliner comfortably  On nasal cannula oxygen at 1 L  Adequate saturations  No tachypnea  Awake and alert    Review of Systems:  A comprehensive review of systems was negative except for that written in the HPI.      Current Facility-Administered Medications   Medication Dose Route Frequency Provider Last Rate Last Dose    insulin lispro (HUMALOG) injection 12 Units  12 Units SubCUTAneous TIDAC Amanuel Baldwin NP   12 Units at 11/25/20 1805    latanoprost (XALATAN) 0.005 % ophthalmic solution 1 Drop  1 Drop Left Eye QPM Adams Carrera MD   1 Drop at 11/25/20 1800    timolol (TIMOPTIC) 0.5 % ophthalmic solution 1 Drop  1 Drop Left Eye DAILY Adams Carrera MD   1 Drop at 11/25/20 0900    dorzolamide (TRUSOPT) 2 % ophthalmic solution 1 Drop  1 Drop Left Eye BID Adams Carrera MD   1 Drop at 11/25/20 2118    brimonidine (ALPHAGAN) 0.2 % ophthalmic solution 1 Drop  1 Drop Left Eye BID Adams Carrera MD   1 Drop at 11/25/20 2118    melatonin tablet 3 mg  3 mg Oral QHS PRN Annette Farris NP   3 mg at 11/24/20 2153    benzonatate (TESSALON) capsule 100 mg  100 mg Oral TID PRN Amado Will MD   100 mg at 11/25/20 1017    azithromycin (ZITHROMAX) 500 mg in 0.9% sodium chloride 250 mL (VIAL-MATE)  500 mg IntraVENous Q24H Claudell Greening C, MD   500 mg at 11/25/20 1703    cefTRIAXone (ROCEPHIN) 1 g in 0.9% sodium chloride (MBP/ADV) 50 mL MBP  1 g IntraVENous Q24H Claudell Greening C,  mL/hr at 11/25/20 1704 1 g at 11/25/20 1704    albuterol (PROVENTIL HFA, VENTOLIN HFA, PROAIR HFA) inhaler 2 Puff  2 Puff Inhalation Q4H PRN Amado Will MD        insulin lispro (HUMALOG) injection   SubCUTAneous AC&HS Amado Will MD   10 Units at 11/25/20 2142    glucose chewable tablet 16 g  4 Tab Oral PRN Ike Martin MD        glucagon (GLUCAGEN) injection 1 mg  1 mg IntraMUSCular PRN Ike Martin MD        dextrose (D50W) injection syrg 12.5-25 g  25-50 mL IntraVENous PRN Ike Martin MD        sodium chloride (NS) flush 5-40 mL  5-40 mL IntraVENous Q8H Ike Martin MD   10 mL at 11/26/20 6016    sodium chloride (NS) flush 5-40 mL  5-40 mL IntraVENous PRN Ike Martin MD        acetaminophen (TYLENOL) tablet 650 mg  650 mg Oral Q6H PRN Ike Martin MD   650 mg at 11/23/20 2142    Or    acetaminophen (TYLENOL) suppository 650 mg  650 mg Rectal Q6H PRN Ike Martin MD        polyethylene glycol (MIRALAX) packet 17 g  17 g Oral DAILY PRN Ike Martin MD        promethazine (PHENERGAN) tablet 12.5 mg  12.5 mg Oral Q6H PRN Ike Martin MD        Or    ondansetron [facility]) injection 4 mg  4 mg IntraVENous Q6H PRN Ike Martin MD        enoxaparin (LOVENOX) injection 40 mg  40 mg SubCUTAneous DAILY Ike Martin MD   40 mg at 11/25/20 1017    dexamethasone (DECADRON) 4 mg/mL injection 6 mg  6 mg IntraVENous DAILY Ike Martin MD   6 mg at 11/25/20 1024    remdesivir 100 mg in 0.9% sodium chloride 250 mL IVPB  100 mg IntraVENous Q24H Justin MANCILLA MD   100 mg at 11/25/20 2143    amLODIPine (NORVASC) tablet 10 mg  10 mg Oral DAILY Ike Martin MD   10 mg at 11/25/20 1017    atenoloL (TENORMIN) tablet 50 mg  50 mg Oral DAILY Ike Martin MD   50 mg at 11/25/20 1024    cloNIDine HCL (CATAPRES) tablet 0.2 mg  0.2 mg Oral BID Ike Martin MD   0.2 mg at 11/25/20 2115    lisinopriL (PRINIVIL, ZESTRIL) tablet 20 mg  20 mg Oral DAILY Ike Martin MD   20 mg at 11/25/20 1024    atorvastatin (LIPITOR) tablet 10 mg  10 mg Oral QHS Ike Martin MD   10 mg at 11/25/20 2114          Allergies   Allergen Reactions    Aspirin Shortness of Breath     Respiratory distress    Codeine Vertigo           Objective:     Blood pressure (!) 166/85, pulse (!) 48, temperature 98.6 °F (37 °C), resp. rate 20, height 5' 6\" (1.676 m), weight 104.3 kg (230 lb), SpO2 96 %. Temp (24hrs), Av.2 °F (36.8 °C), Min:98 °F (36.7 °C), Max:98.6 °F (37 °C)      Intake and Output:  Current Shift: No intake/output data recorded. Last 3 Shifts:  190 -  0700  In: 9881 [I.V.:1650]  Out: -     Physical Exam:     General: Lying in bed comfortably, no acute distress, obese  Eye: Reactive, symmetric  Throat and Neck: Supple  Lung: Reduced air entry bilaterally with prolonged exhalation but no wheezing. Occasional crackles. Heart: S1+S2. No murmurs  Abdomen: soft, non-tender. Bowel sounds normal. No masses; obese  Extremities: No edema  : Not done  Skin: No cyanosis  Neurologic: A & O x3.   Grossly nonfocal  Psychiatric: Appropriate affect; coherent      Lab/Data Review:    Recent Results (from the past 24 hour(s))   GLUCOSE, POC    Collection Time: 20 11:50 AM   Result Value Ref Range    Glucose (POC) 343 (H) 65 - 100 mg/dL    Performed by Sue Urbina    GLUCOSE, POC    Collection Time: 20  5:56 PM   Result Value Ref Range    Glucose (POC) 309 (H) 65 - 100 mg/dL    Performed by Kenan Luke    CBC W/O DIFF    Collection Time: 20  8:32 PM   Result Value Ref Range    WBC 7.9 3.6 - 11.0 K/uL    RBC 3.95 3.80 - 5.20 M/uL    HGB 11.7 11.5 - 16.0 g/dL    HCT 36.1 35.0 - 47.0 %    MCV 91.4 80.0 - 99.0 FL    MCH 29.6 26.0 - 34.0 PG    MCHC 32.4 30.0 - 36.5 g/dL    RDW 13.5 11.5 - 14.5 %    PLATELET 687 (H) 165 - 400 K/uL    MPV 9.6 8.9 - 18.4 FL   METABOLIC PANEL, BASIC    Collection Time: 20  8:32 PM   Result Value Ref Range    Sodium 135 (L) 136 - 145 mmol/L    Potassium 4.2 3.5 - 5.1 mmol/L    Chloride 100 97 - 108 mmol/L    CO2 31 21 - 32 mmol/L    Anion gap 4 (L) 5 - 15 mmol/L    Glucose 363 (H) 65 - 100 mg/dL    BUN 18 6 - 20 mg/dL    Creatinine 0.86 0.55 - 1.02 mg/dL BUN/Creatinine ratio 21 (H) 12 - 20      GFR est AA >60 >60 ml/min/1.73m2    GFR est non-AA >60 >60 ml/min/1.73m2    Calcium 9.1 8.5 - 10.1 mg/dL   MAGNESIUM    Collection Time: 11/25/20  8:32 PM   Result Value Ref Range    Magnesium 1.7 1.6 - 2.4 mg/dL   GLUCOSE, POC    Collection Time: 11/25/20  8:45 PM   Result Value Ref Range    Glucose (POC) 368 (H) 65 - 100 mg/dL    Performed by Wolfgang Hook    GLUCOSE, POC    Collection Time: 11/25/20  9:14 PM   Result Value Ref Range    Glucose (POC) 353 (H) 65 - 100 mg/dL    Performed by [address], QT    Collection Time: 11/26/20  8:00 AM   Result Value Ref Range    C-Reactive protein 1.74 (H) 0.00 - 0.60 mg/dL   LD    Collection Time: 11/26/20  8:00 AM   Result Value Ref Range     (H) 81 - 246 U/L   GLUCOSE, POC    Collection Time: 11/26/20  8:03 AM   Result Value Ref Range    Glucose (POC) 246 (H) 65 - 100 mg/dL    Performed by Ludin ALFRED        XR CHEST SNGL V   Final Result      CT CHEST WO CONT    (Results Pending)       CT Results  (Last 48 hours)    None            Assessment:     1. Acute respiratory failure with hypoxia  2. COVID-19 pneumonia  3. Acute exacerbation of COPD  4. Ex-smoker  5. Hypokalemia  6. Shortness of breath  7.   Cough    Plan:     Patient admitted to the hospital  Will be watched here closely    Nasal cannula oxygen as needed to keep saturation above 92%  Currently 1 L will cannula oxygen    Currently on remdesivir  Continues on Decadron 6 mg daily  Nebulizers on hold given patient's Covid positive status  Continue albuterol    Likely element of COPD exacerbation  This should be covered with Decadron and inhalers  Continue azithromycin and Rocephin for antibiotic coverage  Give Lasix 40 mg IV x1 now    Follow clinically    DVT and GI prophylaxis    Close to discharge from pulmonary standpoint  Will need ambulatory oximetry prior to discharge  Will need to follow with me in outpatient clinic in 6 weeks since she was Covid positive    Questions of patient were answered at bedside in detail  Case discussed in detail with RN, RT, and care team    Thank you for involving me in the care of this patient  I will follow with you closely during hospitalization    Tayo Jimenez MD  [facility]  11/26/2020  3:19 PM

## 2020-11-26 NOTE — PROGRESS NOTES
Progress Note    Patient: Sony Canales MRN: [id number]  SSN: [id number]    YOB: 1950  Age: 65 y.o. Sex: female      Admit Date: 11/22/2020    LOS: 4 days     Subjective:   Patient followed for Covid-19 infection and respiratory failure with underlying history of asthma. She remains afebrile. LDH and CRP decreasing. She is currently on Zithromax, Decadron and Remdesivir. Patient off O2 at this time. Sitting up in bedside chair, reporting mild nonproductive cough. Objective:     Vitals:    11/25/20 2115 11/26/20 0132 11/26/20 0456 11/26/20 0920   BP: (!) 165/88 (!) 164/85 (!) 166/85    Pulse: (!) 58 (!) 52 (!) 48    Resp:  20 20    Temp:  98.1 °F (36.7 °C) 98.6 °F (37 °C)    SpO2:  99% 99% 96%   Weight:       Height:            Intake and Output:  Current Shift: No intake/output data recorded. Last three shifts: 11/24 1901 - 11/26 0700  In: 4434 [I.V.:1650]  Out: -     Physical Exam:   Vitals signs and nursing note reviewed. Constitutional:       General: She is not in acute distress. Appearance: She is ill-appearing. She is not diaphoretic. HENT: Nasal O2 at 2L/min    Pulmonary:      Breath sounds: No wheezing, rhonchi or rales. Abdominal:      Palpations: Abdomen is soft. Tenderness: There is no abdominal tenderness. Genitourinary:     Comments: No [place]  Musculoskeletal:      Right lower leg: No edema. Left lower leg: No edema. Skin:     Findings: No rash. Neurological:      General: No focal deficit present. Mental Status: She is alert and oriented to person, place, and time. Psychiatric:         Mood and Affect: Mood normal.         Behavior: Behavior normal.         Thought Content:  Thought content normal.         Judgment: Judgment normal.      Lab/Data Review:    WBC 6,100  Procalcitonin normal   <340  Ferritin 183  CRP 1.74 <10.20    Assessment:     Principal Problem:    COVID-19 (11/22/2020)    Active Problems:    Hypokalemia (11/22/2020)      Pneumonia (11/22/2020)    1. Covid-19 infection with ?pneumonitis, Day #4 IV Remdesivir, Azithomycin, Ceftriaxone, Decadron  2. ?Asthma exacerbation  3. Acute hypoxic respiratory failure  4. Elevated LDH and CRP, secondary to #1, resolving    Comment:  CRP and LDH decreasing. Plan:     1. Continue Zithromax, Ceftriaxone, Remdesivir through tomorrow. 2. In am, repeat CRP, LDH  3.  Cleared for discharge tomorrow from ID standpoint    Signed By: Amy Lara MD     November 26, 2020

## 2020-11-26 NOTE — PROGRESS NOTES
CM spoke to patient via phone and patient gave verbal consent HH. CM sent a referral to [facility]. CM will continue to follow patient for discharge planning needs.

## 2020-11-26 NOTE — PROGRESS NOTES
PHYSICAL THERAPY EVALUATION  Patient: Khushboo Limon (70 y.o. female)  Date: 11/26/2020  Primary Diagnosis: COVID-19 [U07.1]  Pneumonia [J18.9]  Hypokalemia [E87.6]        Precautions: droplet plus (covid 19 positive)  Fall    ASSESSMENT  Based on the objective data described below, the patient presents with generalized weakness, impaired functional mobility, impaired amb, impaired balance, and decreased activity tolerance. Pt admitted on 11/22/2020, tested positive for COVID 19. Pt A&O x 4, per pt reports pt resides with  in a 1 with 3 TEN, bilateral handrails, pt was I for ADLS/IADLS, no AD with home mobility but a RW for community distances prior to admission. Pt semi-supine in bed upon PT arrival, agreeable to evaluation. Pt required SBA for bed mobility, SBA supine > sit, CGA sit <> stand transfers. Pt amb 20 feet from bed >bathroom >bedside recliner with gt belt, no AD, and CGA; demonstrating WBOS with slow, waddle type step through gt pattern with no LOB or knee buckling noted. Pt I with toileting hygiene for voiding Pt did well with session today with no c/o SOB, dizziness, or CP throughout session today; pt currently 1L O2 via NC does not use O2 at baseline. Pt will benefit from continued skilled PT to address above deficits and return to PLOF. Current PT DC recommendation HHPT. Current Level of Function Impacting Discharge (mobility/balance): CGA for sit to stand and amb    Other factors to consider for discharge: I at baseline        PLAN :  Recommendations and Planned Interventions: bed mobility training, transfer training, gait training, therapeutic exercises, patient and family training/education and therapeutic activities      Frequency/Duration: Patient will be followed by physical therapy:  3 times a week to address goals.     Recommendation for discharge: (in order for the patient to meet his/her long term goals)  HHPT    This discharge recommendation:  Has been made in collaboration with the attending provider and/or case management    IF patient discharges home will need the following DME: none         SUBJECTIVE:   Patient stated I feel better in the chair, i'm ready to get out of bed.     OBJECTIVE DATA SUMMARY:   HISTORY:    Past Medical History:   Diagnosis Date    Acute sinusitis 11/13/2020    Arthritis     Cyst of ovary 11/13/2020    Essential (primary) hypertension 9/26/2017    Glaucoma 11/30/2018    Gout 8/9/2019    Hypercholesterolemia 11/13/2020    Impacted cerumen 11/13/2020    Mild intermittent asthma, uncomplicated 3/88/5694    Mixed hyperlipidemia due to type 2 diabetes mellitus ([facility] [address] 9/26/2017    Painless rectal bleeding 11/30/2018    Type 2 diabetes mellitus without complications ([facility] [address] 8/03/8138     Past Surgical History:   Procedure Laterality Date    HX COLONOSCOPY      HX HERNIA REPAIR  Unknown date    HX KNEE ARTHROSCOPY Right 01/01/2013    HX OTHER SURGICAL  05/16/2017    Eye surgery procedure    HX PARTIAL HYSTERECTOMY  Unknown date       Personal factors and/or comorbidities impacting plan of care: I at baseline, good family support    Home Situation  Home Environment: Private residence  # Steps to Enter: 3  Rails to Enter: Yes  Hand Rails : Bilateral  Wheelchair Ramp: No  One/Two Story Residence: One story  Living Alone: No  Support Systems: Spouse/Significant Other/Partner()  Patient Expects to be Discharged to[de-identified] Private residence  Current DME Used/Available at Home: Walker, rolling    EXAMINATION/PRESENTATION/DECISION MAKING:   Critical Behavior:  Neurologic State: Alert  Orientation Level: Oriented X4  Cognition: Follows commands  Safety/Judgement: Good awareness of safety precautions  Hearing: Auditory  Auditory Impairment: None  Skin:  Intact where visible   Edema: none noted   Range Of Motion:  AROM: Within functional limits           PROM: Within functional limits           Strength:    Strength:  Within functional limits Tone & Sensation:   Tone: Normal              Sensation: Intact               Coordination:  Coordination: Within functional limits  Vision:      Functional Mobility:  Bed Mobility:  Rolling: Stand-by assistance  Supine to Sit: Stand-by assistance     Scooting: Stand-by assistance  Transfers:  Sit to Stand: Contact guard assistance  Stand to Sit: Setup        Bed to Chair: Contact guard assistance              Balance:   Sitting: Intact; Without support  Standing: Intact; Without support  Ambulation/Gait Training:  Distance (ft): 20 Feet (ft)(bed > bathroom > bedside recliner)  Assistive Device: Gait belt  Ambulation - Level of Assistance: Contact guard assistance     Gait Description (WDL): Exceptions to [facility]           Base of Support: Widened     Speed/Uma: Shuffled; Slow                        Stairs:    not completed this session            Therapeutic Exercises:   Not completed this session     Functional Measure:  [facility] Mobility Inpatient Short Form  How much difficulty does the patient currently have. .. Unable A Lot A Little None   1. Turning over in bed (including adjusting bedclothes, sheets and blankets)? [] 1   [] 2   [] 3   [x] 4   2. Sitting down on and standing up from a chair with arms ( e.g., wheelchair, bedside commode, etc.)   [] 1   [] 2   [x] 3   [] 4   3. Moving from lying on back to sitting on the side of the bed? [] 1   [] 2   [x] 3   [] 4          How much help from another person does the patient currently need. .. Total A Lot A Little None   4. Moving to and from a bed to a chair (including a wheelchair)? [] 1   [] 2   [x] 3   [] 4   5. Need to walk in hospital room? [] 1   [] 2   [x] 3   [] 4   6. Climbing 3-5 steps with a railing? [] 1   [] 2   [x] 3   [] 4   © 2007, [facility] [address] [place] [address] [address] [address], under license to [employer].  All rights reserved     Score:  Initial: 19/24 Most Recent: X (Date: 11/26/2020)   Interpretation of Tool:  Represents activities that are increasingly more difficult (i.e. Bed mobility, Transfers, Gait). Score 24 23 22-20 19-15 14-10 9-7 6   Modifier CH CI CJ CK CL CM CN         Physical Therapy Evaluation Charge Determination   History Examination Presentation Decision-Making   HIGH Complexity :3+ comorbidities / personal factors will impact the outcome/ POC  HIGH Complexity : 4+ Standardized tests and measures addressing body structure, function, activity limitation and / or participation in recreation  LOW Complexity : Stable, uncomplicated  Other outcome measures ampac 6  mod      Based on the above components, the patient evaluation is determined to be of the following complexity level: LOW     Pain Ratin/10    Activity Tolerance:   Good    After treatment patient left in no apparent distress:   Sitting in chair, Call bell within reach and nsg updated    GOALS:    Problem: Mobility Impaired (Adult and Pediatric)  Goal: *Acute Goals and Plan of Care (Insert Text)  Description: Pt will be I with LE HEP in 7 days. Pt will perform bed mobility with mod I in 7 days. Pt will perform transfers with mod I in 7 days. Pt will amb  feet with LRAD safely with mod I in 7 days. Outcome: Not Met       COMMUNICATION/EDUCATION:   The patients plan of care was discussed with: Registered nurse. Fall prevention education was provided and the patient/caregiver indicated understanding., Patient/family have participated as able in goal setting and plan of care. and Patient/family agree to work toward stated goals and plan of care.     Thank you for this referral.  Ronit Warner, PT, DPT   Time Calculation: 16 mins

## 2020-11-26 NOTE — PROGRESS NOTES
Problem: Pneumonia: Discharge Outcomes  Goal: *Demonstrates progressive activity  Outcome: Progressing Towards Goal  Note: Patient is now ambulating around the room on room air and without distress  Goal: *Tolerating diet  Outcome: Progressing Towards Goal  Note: Patient is tolerating her diet and reports no nausea         Bedside shift change report given to Rob Scott RN (oncoming nurse) by Mayur Peralta RN (offgoing nurse). Report included the following information SBAR, Kardex, Intake/Output, MAR, Accordion and Recent Results.

## 2020-11-26 NOTE — PROGRESS NOTES
Hospitalist Progress Note    Subjective:   Daily Progress Note: 11/26/2020 4:06 PM    Hospital Course:  Alexandrea Marino a 65 y. o. female with past medical history of asthma, non-insulin-dependent diabetes mellitus type 2, hypertension and hyperlipidemia presenting to the ER with complaints of shortness of breath, cough, lightheadedness, weakness, dizziness, chills, fatigue and nasal congestion.  Ms. Rolf Mirza was found positive for COVID-19 on November 17, 2020.  Since diagnosis, symptoms have progressively worsened over the past several days.  Ms. Rolf Mirza has not used her albuterol inhaler for symptoms.  Ms. Rolf Mirza was prescribed azithromycin on November 17th and has taken as prescribed. She presents this evening for further treatment and evaluation    Subjective: Pt seen in room, sitting in chair, not on supplemental oxygen,     Current Facility-Administered Medications   Medication Dose Route Frequency    insulin lispro (HUMALOG) injection 12 Units  12 Units SubCUTAneous TIDAC    latanoprost (XALATAN) 0.005 % ophthalmic solution 1 Drop  1 Drop Left Eye QPM    timolol (TIMOPTIC) 0.5 % ophthalmic solution 1 Drop  1 Drop Left Eye DAILY    dorzolamide (TRUSOPT) 2 % ophthalmic solution 1 Drop  1 Drop Left Eye BID    brimonidine (ALPHAGAN) 0.2 % ophthalmic solution 1 Drop  1 Drop Left Eye BID    melatonin tablet 3 mg  3 mg Oral QHS PRN    benzonatate (TESSALON) capsule 100 mg  100 mg Oral TID PRN    cefTRIAXone (ROCEPHIN) 1 g in 0.9% sodium chloride (MBP/ADV) 50 mL MBP  1 g IntraVENous Q24H    albuterol (PROVENTIL HFA, VENTOLIN HFA, PROAIR HFA) inhaler 2 Puff  2 Puff Inhalation Q4H PRN    insulin lispro (HUMALOG) injection   SubCUTAneous AC&HS    glucose chewable tablet 16 g  4 Tab Oral PRN    glucagon (GLUCAGEN) injection 1 mg  1 mg IntraMUSCular PRN    dextrose (D50W) injection syrg 12.5-25 g  25-50 mL IntraVENous PRN    sodium chloride (NS) flush 5-40 mL  5-40 mL IntraVENous Q8H    sodium chloride (NS) flush 5-40 mL  5-40 mL IntraVENous PRN    acetaminophen (TYLENOL) tablet 650 mg  650 mg Oral Q6H PRN    Or    acetaminophen (TYLENOL) suppository 650 mg  650 mg Rectal Q6H PRN    polyethylene glycol (MIRALAX) packet 17 g  17 g Oral DAILY PRN    promethazine (PHENERGAN) tablet 12.5 mg  12.5 mg Oral Q6H PRN    Or    ondansetron (ZOFRAN) injection 4 mg  4 mg IntraVENous Q6H PRN    enoxaparin (LOVENOX) injection 40 mg  40 mg SubCUTAneous DAILY    dexamethasone (DECADRON) 4 mg/mL injection 6 mg  6 mg IntraVENous DAILY    amLODIPine (NORVASC) tablet 10 mg  10 mg Oral DAILY    atenoloL (TENORMIN) tablet 50 mg  50 mg Oral DAILY    cloNIDine HCL (CATAPRES) tablet 0.2 mg  0.2 mg Oral BID    lisinopriL (PRINIVIL, ZESTRIL) tablet 20 mg  20 mg Oral DAILY    atorvastatin (LIPITOR) tablet 10 mg  10 mg Oral QHS        Review of Systems:    Review of Systems   Constitutional: Negative for chills and fever. Respiratory: Negative for cough and shortness of breath. Cardiovascular: Negative for chest pain and palpitations. Gastrointestinal: Negative for heartburn, nausea and vomiting. Genitourinary: Negative for dysuria and frequency. Neurological: Negative for dizziness, weakness and headaches. Objective:     Visit Vitals  BP (!) 161/81 (BP 1 Location: Right leg)   Pulse (!) 53   Temp 98.4 °F (36.9 °C)   Resp 18   Ht 5' 6\" (1.676 m)   Wt 104.3 kg (230 lb)   SpO2 96%   BMI 37.12 kg/m²    O2 Flow Rate (L/min): 1 l/min O2 Device: Room air    Temp (24hrs), Av.3 °F (36.8 °C), Min:98 °F (36.7 °C), Max:98.6 °F (37 °C)      No intake/output data recorded.  1901 -  0700  In: 7415 [I.V.:1650]  Out: -     PHYSICAL EXAM:    Physical Exam   Constitutional: She is oriented to person, place, and time. She appears well-developed. No distress. Cardiovascular: Normal rate, regular rhythm, normal heart sounds and intact distal pulses.    Pulmonary/Chest: Effort normal. She has decreased breath sounds in the right lower field and the left lower field. Abdominal: Soft. Bowel sounds are normal.   Musculoskeletal: Normal range of motion. Neurological: She is alert and oriented to person, place, and time. Skin: Skin is warm and dry. Psychiatric: She has a normal mood and affect.  Her behavior is normal.          Data Review    Recent Results (from the past 24 hour(s))   GLUCOSE, POC    Collection Time: 11/25/20  5:56 PM   Result Value Ref Range    Glucose (POC) 309 (H) 65 - 100 mg/dL    Performed by Allison Carlos    CBC W/O DIFF    Collection Time: 11/25/20  8:32 PM   Result Value Ref Range    WBC 7.9 3.6 - 11.0 K/uL    RBC 3.95 3.80 - 5.20 M/uL    HGB 11.7 11.5 - 16.0 g/dL    HCT 36.1 35.0 - 47.0 %    MCV 91.4 80.0 - 99.0 FL    MCH 29.6 26.0 - 34.0 PG    MCHC 32.4 30.0 - 36.5 g/dL    RDW 13.5 11.5 - 14.5 %    PLATELET 514 (H) 012 - 400 K/uL    MPV 9.6 8.9 - 27.7 FL   METABOLIC PANEL, BASIC    Collection Time: 11/25/20  8:32 PM   Result Value Ref Range    Sodium 135 (L) 136 - 145 mmol/L    Potassium 4.2 3.5 - 5.1 mmol/L    Chloride 100 97 - 108 mmol/L    CO2 31 21 - 32 mmol/L    Anion gap 4 (L) 5 - 15 mmol/L    Glucose 363 (H) 65 - 100 mg/dL    BUN 18 6 - 20 mg/dL    Creatinine 0.86 0.55 - 1.02 mg/dL    BUN/Creatinine ratio 21 (H) 12 - 20      GFR est AA >60 >60 ml/min/1.73m2    GFR est non-AA >60 >60 ml/min/1.73m2    Calcium 9.1 8.5 - 10.1 mg/dL   MAGNESIUM    Collection Time: 11/25/20  8:32 PM   Result Value Ref Range    Magnesium 1.7 1.6 - 2.4 mg/dL   GLUCOSE, POC    Collection Time: 11/25/20  8:45 PM   Result Value Ref Range    Glucose (POC) 368 (H) 65 - 100 mg/dL    Performed by Hannah Diego    GLUCOSE, POC    Collection Time: 11/25/20  9:14 PM   Result Value Ref Range    Glucose (POC) 353 (H) 65 - 100 mg/dL    Performed by Uday Patino    C REACTIVE PROTEIN, QT    Collection Time: 11/26/20  8:00 AM   Result Value Ref Range    C-Reactive protein 1.74 (H) 0.00 - 0.60 mg/dL   LD    Collection Time: 11/26/20 8:00 AM   Result Value Ref Range     (H) 81 - 246 U/L   GLUCOSE, POC    Collection Time: 11/26/20  8:03 AM   Result Value Ref Range    Glucose (POC) 246 (H) 65 - 100 mg/dL    Performed by Jodi Fill    GLUCOSE, POC    Collection Time: 11/26/20 12:12 PM   Result Value Ref Range    Glucose (POC) 299 (H) 65 - 100 mg/dL    Performed by Jodi Fill        XR CHEST SNGL V   Final Result      CT CHEST WO CONT    (Results Pending)       Principal Problem:    COVID-19 (11/22/2020)    Active Problems:    Hypokalemia (11/22/2020)      Pneumonia (11/22/2020)        Assessment/Plan:     1. Covid 19 infection/pneumonia with hypoxia-  pulmonary, ID following,  , IV decadron, zithromax, rocephin,  IV remedesivir completed, now on room air,  Get overnight pulse ox to evaluate for nocturnal O2 at home, will need outpatient f/u with pulmonary after d/c.       2. History of asthma- prn albuterol,      3.Hypertension- controlled, continue medications     4. DM II- s/s coverage, increase tid mealtime insulin    5. Discharge- possible tomorrow if remains clinically stable.        DVT Prophylaxis: lovenox  Code Status:  Full Code  POA:    Care Plan discussed with:   _____patient, staff nurse__________________________________________________________    Raphael Saucedo NP

## 2020-11-27 VITALS
SYSTOLIC BLOOD PRESSURE: 154 MMHG | HEART RATE: 60 BPM | RESPIRATION RATE: 20 BRPM | OXYGEN SATURATION: 95 % | TEMPERATURE: 98.1 F | WEIGHT: 230 LBS | BODY MASS INDEX: 36.96 KG/M2 | HEIGHT: 66 IN | DIASTOLIC BLOOD PRESSURE: 79 MMHG

## 2020-11-27 LAB
GLUCOSE BLD STRIP.AUTO-MCNC: 244 MG/DL (ref 65–100)
GLUCOSE BLD STRIP.AUTO-MCNC: 353 MG/DL (ref 65–100)
GLUCOSE BLD STRIP.AUTO-MCNC: 448 MG/DL (ref 65–100)
PERFORMED BY, TECHID: ABNORMAL

## 2020-11-27 PROCEDURE — 74011250637 HC RX REV CODE- 250/637: Performed by: NURSE PRACTITIONER

## 2020-11-27 PROCEDURE — 90674 CCIIV4 VAC NO PRSV 0.5 ML IM: CPT | Performed by: NURSE PRACTITIONER

## 2020-11-27 PROCEDURE — 74011250636 HC RX REV CODE- 250/636: Performed by: FAMILY MEDICINE

## 2020-11-27 PROCEDURE — 94762 N-INVAS EAR/PLS OXIMTRY CONT: CPT

## 2020-11-27 PROCEDURE — 82962 GLUCOSE BLOOD TEST: CPT

## 2020-11-27 PROCEDURE — 90471 IMMUNIZATION ADMIN: CPT

## 2020-11-27 PROCEDURE — 74011636637 HC RX REV CODE- 636/637: Performed by: FAMILY MEDICINE

## 2020-11-27 PROCEDURE — 74011250637 HC RX REV CODE- 250/637: Performed by: FAMILY MEDICINE

## 2020-11-27 PROCEDURE — 99231 SBSQ HOSP IP/OBS SF/LOW 25: CPT | Performed by: INTERNAL MEDICINE

## 2020-11-27 PROCEDURE — 74011250636 HC RX REV CODE- 250/636: Performed by: INTERNAL MEDICINE

## 2020-11-27 PROCEDURE — 77010033678 HC OXYGEN DAILY

## 2020-11-27 PROCEDURE — 74011250636 HC RX REV CODE- 250/636: Performed by: NURSE PRACTITIONER

## 2020-11-27 PROCEDURE — 74011636637 HC RX REV CODE- 636/637: Performed by: NURSE PRACTITIONER

## 2020-11-27 RX ORDER — BENZONATATE 100 MG/1
100 CAPSULE ORAL
Qty: 21 CAP | Refills: 0 | Status: SHIPPED | OUTPATIENT
Start: 2020-11-27 | End: 2020-12-04

## 2020-11-27 RX ORDER — ALBUTEROL SULFATE 0.83 MG/ML
2.5 SOLUTION RESPIRATORY (INHALATION)
Qty: 30 NEBULE | Refills: 0 | Status: SHIPPED | OUTPATIENT
Start: 2020-11-27 | End: 2020-12-27

## 2020-11-27 RX ORDER — FUROSEMIDE 10 MG/ML
40 INJECTION INTRAMUSCULAR; INTRAVENOUS ONCE
Status: COMPLETED | OUTPATIENT
Start: 2020-11-27 | End: 2020-11-27

## 2020-11-27 RX ORDER — AZITHROMYCIN 250 MG/1
250 TABLET, FILM COATED ORAL DAILY
Qty: 6 TAB | Refills: 0 | Status: SHIPPED | OUTPATIENT
Start: 2020-11-27 | End: 2020-12-01

## 2020-11-27 RX ORDER — ALBUTEROL SULFATE 90 UG/1
1 AEROSOL, METERED RESPIRATORY (INHALATION)
Qty: 1 INHALER | Refills: 0 | Status: SHIPPED | OUTPATIENT
Start: 2020-11-27 | End: 2020-12-27

## 2020-11-27 RX ADMIN — TIMOLOL MALEATE 1 DROP: 5 SOLUTION/ DROPS OPHTHALMIC at 09:00

## 2020-11-27 RX ADMIN — LISINOPRIL 40 MG: 40 TABLET ORAL at 10:24

## 2020-11-27 RX ADMIN — ATENOLOL 50 MG: 50 TABLET ORAL at 10:24

## 2020-11-27 RX ADMIN — INSULIN LISPRO 12 UNITS: 100 INJECTION, SOLUTION INTRAVENOUS; SUBCUTANEOUS at 07:30

## 2020-11-27 RX ADMIN — Medication 10 ML: at 14:00

## 2020-11-27 RX ADMIN — BENZONATATE 100 MG: 100 CAPSULE ORAL at 10:25

## 2020-11-27 RX ADMIN — INSULIN LISPRO 12 UNITS: 100 INJECTION, SOLUTION INTRAVENOUS; SUBCUTANEOUS at 13:23

## 2020-11-27 RX ADMIN — FUROSEMIDE 40 MG: 10 INJECTION, SOLUTION INTRAMUSCULAR; INTRAVENOUS at 13:23

## 2020-11-27 RX ADMIN — INSULIN LISPRO 8 UNITS: 100 INJECTION, SOLUTION INTRAVENOUS; SUBCUTANEOUS at 11:30

## 2020-11-27 RX ADMIN — ENOXAPARIN SODIUM 40 MG: 40 INJECTION SUBCUTANEOUS at 10:24

## 2020-11-27 RX ADMIN — DORZOLAMIDE HYDROCHLORIDE 1 DROP: 20 SOLUTION/ DROPS OPHTHALMIC at 09:00

## 2020-11-27 RX ADMIN — Medication 10 ML: at 06:00

## 2020-11-27 RX ADMIN — CLONIDINE HYDROCHLORIDE 0.2 MG: 0.1 TABLET ORAL at 10:25

## 2020-11-27 RX ADMIN — AZITHROMYCIN MONOHYDRATE 500 MG: 500 TABLET ORAL at 10:25

## 2020-11-27 RX ADMIN — INFLUENZA A VIRUS A/NEBRASKA/14/2019 (H1N1) ANTIGEN (MDCK CELL DERIVED, PROPIOLACTONE INACTIVATED), INFLUENZA A VIRUS A/DELAWARE/39/2019 (H3N2) ANTIGEN (MDCK CELL DERIVED, PROPIOLACTONE INACTIVATED), INFLUENZA B VIRUS B/SINGAPORE/INFTT-16-0610/2016 ANTIGEN (MDCK CELL DERIVED, PROPIOLACTONE INACTIVATED), INFLUENZA B VIRUS B/DARWIN/7/2019 ANTIGEN (MDCK CELL DERIVED, PROPIOLACTONE INACTIVATED) 0.5 ML: 15; 15; 15; 15 INJECTION, SUSPENSION INTRAMUSCULAR at 16:58

## 2020-11-27 RX ADMIN — DEXAMETHASONE SODIUM PHOSPHATE 6 MG: 4 INJECTION, SOLUTION INTRAMUSCULAR; INTRAVENOUS at 10:25

## 2020-11-27 RX ADMIN — INSULIN LISPRO 6 UNITS: 100 INJECTION, SOLUTION INTRAVENOUS; SUBCUTANEOUS at 10:24

## 2020-11-27 RX ADMIN — BRIMONIDINE TARTRATE 1 DROP: 2 SOLUTION OPHTHALMIC at 09:00

## 2020-11-27 RX ADMIN — AMLODIPINE BESYLATE 10 MG: 5 TABLET ORAL at 10:25

## 2020-11-27 NOTE — PROGRESS NOTES
Progress Note    Patient: Shantal Alicia MRN: [id number]  SSN: [id number]    YOB: 1950  Age: 65 y.o. Sex: female      Admit Date: 11/22/2020    LOS: 5 days     Subjective:   Patient followed for Covid-19 infection and respiratory failure with underlying history of asthma. She remains afebrile. LDH and CRP decreasing. She has completed Zithromax, Decadron and Remdesivir. Patient off O2 at this time. Sitting up in bedside chair with no complaints. Objective:     Vitals:    11/27/20 0036 11/27/20 0425 11/27/20 0828 11/27/20 0929   BP: (!) 156/81 (!) 162/78 (!) 161/77    Pulse: (!) 58 (!) 48 (!) 52    Resp: 20 18 18    Temp: 97.6 °F (36.4 °C)  97.8 °F (36.6 °C)    SpO2: 100% 99% 98% 95%   Weight:       Height:            Intake and Output:  Current Shift: No intake/output data recorded. Last three shifts: 11/25 1901 - 11/27 0700  In: 550 [I.V.:550]  Out: -     Physical Exam:   Vitals signs and nursing note reviewed. Constitutional:       General: She is not in acute distress. Appearance: She is ill-appearing. She is not diaphoretic. HENT: Off nasal O2    Pulmonary:      Breath sounds: No wheezing, rhonchi or rales. Abdominal:      Palpations: Abdomen is soft. Tenderness: There is no abdominal tenderness. Genitourinary:     Comments: No [place]  Musculoskeletal:      Right lower leg: No edema. Left lower leg: No edema. Skin:     Findings: No rash. Neurological:      General: No focal deficit present. Mental Status: She is alert and oriented to person, place, and time. Psychiatric:         Mood and Affect: Mood normal.         Behavior: Behavior normal.         Thought Content:  Thought content normal.         Judgment: Judgment normal.      Lab/Data Review:    WBC 6,100  Procalcitonin normal   <340  Ferritin 183  CRP Pending <1.74 <10.20    Assessment:     Principal Problem:    COVID-19 (11/22/2020)    Active Problems:    Hypokalemia (11/22/2020) Pneumonia (11/22/2020)    1. Covid-19 infection with ?pneumonitis, Day #5 IV Remdesivir, Azithomycin, Ceftriaxone, Decadron  2. ?Asthma exacerbation  3. Acute hypoxic respiratory failure  4. Elevated LDH and CRP, secondary to #1, resolving    Comment:  CRP and LDH decreasing. Plan:     1. No further treatment recommendations  3.  Cleared for discharge tomorrow from ID standpoint    Signed By: Fanny Patel MD     November 27, 2020

## 2020-11-27 NOTE — PROGRESS NOTES
Pulmonary and Critical Care progress note    Subjective:         Chief Complaint:   Chief Complaint   Patient presents with    Shortness of Breath    Concern For COVID-19 (Coronavirus)        Patient seen and examined  Overnight events noted    Lying in the recliner comfortably  On room air now  Adequate saturations  No tachypnea  Awake and alert    Review of Systems:  A comprehensive review of systems was negative except for that written in the HPI.      Current Facility-Administered Medications   Medication Dose Route Frequency Provider Last Rate Last Dose    azithromycin (ZITHROMAX) tablet 500 mg  500 mg Oral DAILY Nicolasa Amanuel QUINTEN, NP   500 mg at 11/27/20 1025    lisinopriL (PRINIVIL, ZESTRIL) tablet 40 mg  40 mg Oral DAILY Nicolasa Amanuel QUINTEN, NP   40 mg at 11/27/20 1024    insulin lispro (HUMALOG) injection 12 Units  12 Units SubCUTAneous TIDAC Nicolasa Amanuel QUINTEN, NP   12 Units at 11/27/20 0730    latanoprost (XALATAN) 0.005 % ophthalmic solution 1 Drop  1 Drop Left Eye QPM Ira Solano MD   1 Drop at 11/26/20 1800    timolol (TIMOPTIC) 0.5 % ophthalmic solution 1 Drop  1 Drop Left Eye DAILY Ira Solano MD   1 Drop at 11/27/20 0900    dorzolamide (TRUSOPT) 2 % ophthalmic solution 1 Drop  1 Drop Left Eye BID Ira Solano MD   1 Drop at 11/27/20 0900    brimonidine (ALPHAGAN) 0.2 % ophthalmic solution 1 Drop  1 Drop Left Eye BID Ira Solano MD   1 Drop at 11/27/20 0900    melatonin tablet 3 mg  3 mg Oral QHS PRN Jax Souza NP   3 mg at 11/24/20 2153    benzonatate (TESSALON) capsule 100 mg  100 mg Oral TID PRN Danial Araya MD   100 mg at 11/27/20 1025    cefTRIAXone (ROCEPHIN) 1 g in 0.9% sodium chloride (MBP/ADV) 50 mL MBP  1 g IntraVENous Q24H Danial Araya  mL/hr at 11/26/20 1819 1 g at 11/26/20 1819    albuterol (PROVENTIL HFA, VENTOLIN HFA, PROAIR HFA) inhaler 2 Puff  2 Puff Inhalation Q4H PRN Danial Araya MD        insulin lispro (HUMALOG) injection SubCUTAneous AC&HS Brayan Paris MD   6 Units at 11/27/20 1024    glucose chewable tablet 16 g  4 Tab Oral PRN Brayan Paris MD        glucagon (GLUCAGEN) injection 1 mg  1 mg IntraMUSCular PRN Brayan Paris MD        dextrose (D50W) injection syrg 12.5-25 g  25-50 mL IntraVENous PRN Brayan Paris MD        sodium chloride (NS) flush 5-40 mL  5-40 mL IntraVENous Q8H Brayan Paris MD   10 mL at 11/27/20 0600    sodium chloride (NS) flush 5-40 mL  5-40 mL IntraVENous PRN Brayan Paris MD        acetaminophen (TYLENOL) tablet 650 mg  650 mg Oral Q6H PRN Brayan Paris MD   650 mg at 11/23/20 2142    Or    acetaminophen (TYLENOL) suppository 650 mg  650 mg Rectal Q6H PRN Brayan Paris MD        polyethylene glycol (MIRALAX) packet 17 g  17 g Oral DAILY PRN Brayan Paris MD        promethazine (PHENERGAN) tablet 12.5 mg  12.5 mg Oral Q6H PRN Brayan Paris MD        Or    ondansetron [facility]) injection 4 mg  4 mg IntraVENous Q6H PRN Brayan Paris MD        enoxaparin (LOVENOX) injection 40 mg  40 mg SubCUTAneous DAILY Brayan Paris MD   40 mg at 11/27/20 1024    dexamethasone (DECADRON) 4 mg/mL injection 6 mg  6 mg IntraVENous DAILY Brayan Paris MD   6 mg at 11/27/20 1025    amLODIPine (NORVASC) tablet 10 mg  10 mg Oral DAILY Brayan Paris MD   10 mg at 11/27/20 1025    atenoloL (TENORMIN) tablet 50 mg  50 mg Oral DAILY Brayan Paris MD   50 mg at 11/27/20 1024    cloNIDine HCL (CATAPRES) tablet 0.2 mg  0.2 mg Oral BID Brayan Paris MD   0.2 mg at 11/27/20 1025    atorvastatin (LIPITOR) tablet 10 mg  10 mg Oral QHS Brayan Paris MD   10 mg at 11/26/20 2242          Allergies   Allergen Reactions    Aspirin Shortness of Breath     Respiratory distress    Codeine Vertigo           Objective:     Blood pressure (!) 161/77, pulse (!) 52, temperature 97.8 °F (36.6 °C), resp.  rate 18, height 5' 6\" (1.676 m), weight 104.3 kg (230 lb), SpO2 95 %. Temp (24hrs), Av.1 °F (36.7 °C), Min:97.6 °F (36.4 °C), Max:98.5 °F (36.9 °C)      Intake and Output:  Current Shift: No intake/output data recorded. Last 3 Shifts:  190 -  0700  In: 550 [I.V.:550]  Out: -     Physical Exam:     General: Lying in bed comfortably, no acute distress, obese  Eye: Reactive, symmetric  Throat and Neck: Supple  Lung: Reduced air entry bilaterally with prolonged exhalation but no wheezing. Occasional crackles. Heart: S1+S2. No murmurs  Abdomen: soft, non-tender. Bowel sounds normal. No masses; obese  Extremities: No edema  : Not done  Skin: No cyanosis  Neurologic: A & O x3. Grossly nonfocal  Psychiatric: Appropriate affect; coherent      Lab/Data Review:    Recent Results (from the past 24 hour(s))   GLUCOSE, POC    Collection Time: 20 12:12 PM   Result Value Ref Range    Glucose (POC) 299 (H) 65 - 100 mg/dL    Performed by Jayne Rosales    GLUCOSE, POC    Collection Time: 20  4:39 PM   Result Value Ref Range    Glucose (POC) 389 (H) 65 - 100 mg/dL    Performed by Maday Gil, POC    Collection Time: 20  8:21 PM   Result Value Ref Range    Glucose (POC) 437 (H) 65 - 100 mg/dL    Performed by [employer], POC    Collection Time: 20 10:40 PM   Result Value Ref Range    Glucose (POC) 375 (H) 65 - 100 mg/dL    Performed by [employer], POC    Collection Time: 20  8:24 AM   Result Value Ref Range    Glucose (POC) 244 (H) 65 - 100 mg/dL    Performed by [facility]        XR CHEST SNGL V   Final Result      CT CHEST WO CONT    (Results Pending)       CT Results  (Last 48 hours)    None            Assessment:     1. Acute respiratory failure with hypoxia  2. COVID-19 pneumonia  3. Acute exacerbation of COPD  4. Ex-smoker  5. Hypokalemia  6. Shortness of breath  7.   Cough    Plan:     Patient admitted to the hospital  Will be watched here closely    On room air with adequate saturations    Currently on remdesivir  Continues on Decadron 6 mg daily  Nebulizers on hold given patient's Covid positive status  Continue albuterol    Likely element of COPD exacerbation  This should be covered with Decadron and inhalers  Continue azithromycin and Rocephin for antibiotic coverage  Give Lasix 40 mg IV x1 now    Follow clinically    DVT and GI prophylaxis    Close to discharge from pulmonary standpoint  Will need ambulatory oximetry prior to discharge  Will need to follow with me in outpatient clinic in 6 weeks since she was Covid positive    Questions of patient were answered at bedside in detail  Case discussed in detail with RN, RT, and care team    Thank you for involving me in the care of this patient  I will follow with you closely during hospitalization    Connor Begum MD  [facility]  11/27/2020  3:19 PM

## 2020-11-27 NOTE — DISCHARGE INSTRUCTIONS
Patient Education        Learning How To Care for Someone Who Has COVID-19  Things to know  Most people who get COVID-19 will recover with time and home care. Here are some things to know if you're caring for someone who's sick. · Treat the symptoms. Common symptoms include a fever, coughing, and feeling short of breath. Urge the person to get extra rest and drink plenty of fluids to replace fluids lost from fever. To reduce a fever, offer acetaminophen (such as Tylenol). It may also help with muscle aches. Read and follow all instructions on the label. · Watch for signs that the illness is getting worse. The person may need medical care if they're getting sicker (for example, if it's hard to breathe). But call the doctor's office before you go. They can tell you what to do. Call 911 or emergency services if the person has any of these symptoms:  ? Severe trouble breathing or shortness of breath. ? Constant pain or pressure in their chest.  ? Confusion, or trouble thinking clearly. ? A blue tint to their lips or face. Some people are more likely to get very sick and need medical care. Call the doctor as soon as symptoms start if the person you're caring for is over 72, smokes, or has a serious health problem, like asthma, heart disease, diabetes, or an immune system problem. · Protect yourself and others. The virus spreads easily from person to person, so take extra care to avoid catching or spreading the infection. ? Keep the sick person away from others as much as you can. § Have the person stay in one room. If you can, give them their own bathroom to use. § Have only one person take care of them. Keep other people--and pets--out of the sickroom. § Have the person wear a cloth face cover around other people. This includes when anyone is in the room with them or if they leave their room (for example, to go to the bathroom).  If the face cover makes it harder for the sick person to breathe, the other person should wear a face cover. § Don't share personal items. These include dishes, cups, towels, and bedding. ? Wash your hands often and well. Use soap and water, and scrub for at least 20 seconds. This is especially important after you've been around the sick person or touched things they've touched. If soap and water aren't handy, use a hand  with at least 60% alcohol. ? Avoid touching your mouth, nose, and eyes. ? Take care with the person's laundry. It's okay to wash the sick person's laundry with yours. If you have them, wear disposable gloves when handling their dirty laundry, and wash your hands well after you touch it. Wash items in the warmest water allowed for the fabric type, and dry them completely. ? Clean high-touch items every day and anytime the sick person touches them. These include doorknobs, light switches, toilets, counters, and remote controls. Use a household disinfectant or a homemade bleach solution. (Follow the directions on the label.) If the sick person has their own room, have them disinfect it every day. ? Limit visitors to your home. To help protect family and friends, stay in touch with them only by phone or computer. Current as of: July 10, 2020               Content Version: 12.6  © 1420-7909 [employer]. Care instructions adapted under license by [employer] (which disclaims liability or warranty for this information). If you have questions about a medical condition or this instruction, always ask your healthcare professional. [place] [address] any warranty or liability for your use of this information. Patient Education        10 Things to Do When You Have COVID-19    Stay home. Don't go to school, work, or public areas. And don't use public transportation, ride-shares, or taxis unless you have no choice. Leave your home only if you need to get medical care.  But call the doctor's office first so they know you're coming. And wear a cloth face cover. Ask before leaving isolation. Talk with your doctor or other health professional about when it will be safe for you to leave isolation. Wear a cloth face cover when you are around other people. It can help stop the spread of the virus when you cough or sneeze. Limit contact with people in your home. If possible, stay in a separate bedroom and use a separate bathroom. Avoid contact with pets and other animals. If possible, have a friend or family member care for them while you're sick. Cover your mouth and nose with a tissue when you cough or sneeze. Then throw the tissue in the trash right away. Wash your hands often, especially after you cough or sneeze. Use soap and water, and scrub for at least 20 seconds. If soap and water aren't available, use an alcohol-based hand . Don't share personal household items. These include bedding, towels, cups and glasses, and eating utensils. Clean and disinfect your home every day. Use household  or disinfectant wipes or sprays. Take special care to clean things that you grab with your hands. These include doorknobs, remote controls, phones, and handles on your refrigerator and microwave. And don't forget countertops, tabletops, bathrooms, and computer keyboards. Take acetaminophen (Tylenol) to relieve fever and body aches. Read and follow all instructions on the label. Current as of: July 10, 2020               Content Version: 12.6  © 2006-2020 [employer]. Care instructions adapted under license by [employer] (which disclaims liability or warranty for this information). If you have questions about a medical condition or this instruction, always ask your healthcare professional. [place] [address] any warranty or liability for your use of this information.

## 2020-11-27 NOTE — PROGRESS NOTES
Cm followed up with pt regarding her nebulizer machine. Cm explained to her she can pay $25 for the machine or if she decides to go through her insurance she will have a co-pay ~$5 per month for 13 months. Pt indicated she will pay th $25 for the nebulizer. CM informed her she would have to stop by [facility] to obtain the equipment. CM informed her [facility] closes at 5:30pm. Pt voiced understanding. Pt plans to stop by [facility] once she is discharged from the hospital. Cm asked pt if she still wanted home health. Pt indicated she no longer needs home health. Cm presented and discussed IMM. IMM obtained. Pt asked writer to call her  - Dandy Speaks [phone] (c) or [phone] (h). Pt also asked writer to have her  give her a call. Cm called the cell phone number pt provided writer and it was the wrong number. Cm called the home phone. Cm informed Mr. Willie Mauricio pt is ready for discharge Mr. Taipa Smoker indicated he will be at the hospital in about 30-45 minutes. Cm asked Mr. Tapia Smoker to call his wife per her request.     Hamilton Hoang with [facility] via [place] [id number] regarding the nebulizer. CM cancelled the referral with [address] . Cm updated Amanuel (NP) and pt's primary nurse. Discharge plan of care/case managment plan validated with provider's discharge order.

## 2020-11-27 NOTE — DISCHARGE SUMMARY
Hospitalist Discharge Summary     Patient ID:    Minerva Lira  [id number]  88 y.o.  1950    Admit date: 11/22/2020    Discharge date : 11/27/2020    Chronic Diagnoses:    Problem List as of 11/27/2020 Date Reviewed: 11/22/2020          Codes Class Noted - Resolved    Hypokalemia ICD-10-CM: E87.6  ICD-9-CM: 276.8  11/22/2020 - Present        Pneumonia ICD-10-CM: J18.9  ICD-9-CM: 486  11/22/2020 - Present        * (Principal) COVID-19 ICD-10-CM: U07.1  ICD-9-CM: 079.89  11/22/2020 - Present        Acute sinusitis ICD-10-CM: J01.90  ICD-9-CM: 461.9  11/13/2020 - Present        Impacted cerumen ICD-10-CM: H61.20  ICD-9-CM: 380.4  11/13/2020 - Present        Arthritis ICD-10-CM: M19.90  ICD-9-CM: 716.90  11/13/2020 - Present        Cyst of ovary ICD-10-CM: N83.209  ICD-9-CM: 620.2  11/13/2020 - Present        Hypercholesterolemia ICD-10-CM: E78.00  ICD-9-CM: 272.0  11/13/2020 - Present        Severe obesity ([address] ICD-10-CM: E66.01  ICD-9-CM: 278.01  8/10/2020 - Present        Mild intermittent asthma, uncomplicated ERG-18-BE: A27.71  ICD-9-CM: 493.90  2/10/2020 - Present        Asthma ICD-10-CM: J45.909  ICD-9-CM: 493.90  2/10/2020 - Present        Gout ICD-10-CM: M10.9  ICD-9-CM: 274.9  8/9/2019 - Present        Glaucoma ICD-10-CM: H40.9  ICD-9-CM: 365.9  11/30/2018 - Present        Painless rectal bleeding ICD-10-CM: K62.5  ICD-9-CM: 569.3  11/30/2018 - Present        Essential (primary) hypertension ICD-10-CM: I10  ICD-9-CM: 401.9  9/26/2017 - Present        Mixed hyperlipidemia due to type 2 diabetes mellitus ([facility] ICD-10-CM: E11.69, E78.2  ICD-9-CM: 250.80, 272.2  9/26/2017 - Present        Type 2 diabetes mellitus without complications ([facility] GQH-17-QT: E11.9  ICD-9-CM: 250.00  9/26/2017 - Present        Osteoarthritis involving multiple joints on both sides of body ICD-10-CM: M15.9  ICD-9-CM: 715.89  8/8/2017 - Present        History of total knee replacement ICD-10-CM: H20.951  ICD-9-CM: V43.65  6/30/2017 - Present          22    Final Diagnoses:   Principal Problem:    COVID-19 (11/22/2020)    Active Problems:    Hypokalemia (11/22/2020)      Pneumonia (11/22/2020)        Reason for Hospitalization:  Lula cee 65 y. o. female with past medical history of asthma, non-insulin-dependent diabetes mellitus type 2, hypertension and hyperlipidemia presenting to the ER with complaints of shortness of breath, cough, lightheadedness, weakness, dizziness, chills, fatigue and nasal congestion.  Ms. Kitty Gao was found positive for COVID-19 on November 17, 2020.  Since diagnosis, symptoms have progressively worsened over the past several days.  Ms. Kitty Gao has not used her albuterol inhaler for symptoms. Ms. Kitty Gao was prescribed azithromycin on November 17th and has taken as prescribed. She presents this evening for further treatment and evaluation        Hospital Course:   Pt admitted for acute shortness of breath found to be covid 19 positive and was treated with IV antibiotics, steroids and received remedesivir x 5 doses and actrema x 2 doses. Pt improved over the hospitalization , not requiring supplemental oxygen. Pt has history of asthma, COPD and will require continuation of home nebulizer treatments via prescribed nebulizer machine. Pt to follow up with pulmonary in 6 weeks, for further evaluation of sleep apnea . Pt to follow up with PCP in one month, continue current prescribed medications at discharge. Discharge Medications:   Current Discharge Medication List      CONTINUE these medications which have CHANGED    Details   albuterol (PROVENTIL VENTOLIN) 2.5 mg /3 mL (0.083 %) nebu 3 mL by Nebulization route every six (6) hours as needed for Wheezing for up to 30 days.   Qty: 30 Nebule, Refills: 0      albuterol (PROVENTIL HFA, VENTOLIN HFA, PROAIR HFA) 90 mcg/actuation inhaler Take 1 Puff by inhalation every four (4) hours as needed for Wheezing or Shortness of Breath for up to 30 days. Qty: 1 Inhaler, Refills: 0      azithromycin (ZITHROMAX) 250 mg tablet Take 1 Tab by mouth daily for 4 days. Take 2 tablets today, then take 1 tablet daily x 4 days  Qty: 6 Tab, Refills: 0      benzonatate (TESSALON) 100 mg capsule Take 1 Cap by mouth three (3) times daily as needed for Cough for up to 7 days. Qty: 21 Cap, Refills: 0         CONTINUE these medications which have NOT CHANGED    Details   amLODIPine (NORVASC) 10 mg tablet TAKE 1 TABLET EVERY DAY  Qty: 90 Tab, Refills: 0      allopurinoL (ZYLOPRIM) 100 mg tablet TAKE 1 TABLET EVERY DAY  Qty: 90 Tab, Refills: 0      pravastatin (PRAVACHOL) 40 mg tablet TAKE 1 TABLET EVERY DAY  Qty: 90 Tab, Refills: 1      hydroCHLOROthiazide (HYDRODIURIL) 25 mg tablet TAKE 1 TABLET EVERY DAY  Qty: 90 Tab, Refills: 1      cloNIDine HCL (CATAPRES) 0.2 mg tablet TAKE 1 TABLET TWICE DAILY  Qty: 180 Tab, Refills: 1      metFORMIN (GLUCOPHAGE) 1,000 mg tablet TAKE 1/2 TABLET TWICE A DAY  Qty: 90 Tab, Refills: 1      lisinopriL (PRINIVIL, ZESTRIL) 20 mg tablet TAKE 1 TABLET EVERY DAY  Qty: 90 Tab, Refills: 1      glipiZIDE (GLUCOTROL) 5 mg tablet Take 2 Tabs by mouth two (2) times a day for 180 days. Take 2 TABS by mouth twice daily. Qty: 360 Tab, Refills: 1    Associated Diagnoses: Controlled type 2 diabetes mellitus without complication, without long-term current use of insulin (HCC)      atenoloL (TENORMIN) 50 mg tablet Take 1 Tab by mouth daily. Indications: high blood pressure  Qty: 90 Tab, Refills: 1    Associated Diagnoses: Essential (primary) hypertension      diclofenac (VOLTAREN) 1 % gel Apply 2 g to affected area four (4) times daily. As needed for arthritis pain  Qty: 100 g, Refills: 1      colchicine (Colcrys) 0.6 mg tablet Take 0.6 mg by mouth two (2) times a day.       True Metrix Glucose Test Strip strip USE TO CHECK BLOOD SUGAR DAILY      TRUEplus Lancets 28 gauge misc USE TO CHECK BLOOD SUGAR DAILY         STOP taking these medications predniSONE (DELTASONE) 20 mg tablet Comments:   Reason for Stopping: Follow up Care:    1. Nancy Frank MD in 1-2 weeks. Follow-up Information     Follow up With Specialties Details Why Contact Info    Nancy Frank MD Family Medicine In 1 month  [id number] [facility]  [phone]      Jose Sandoval MD Pulmonary Disease, Internal Medicine In 6 weeks  [address]  [phone]              Patient Follow Up Instructions: Activity: Activity as tolerated  Diet:  Diabetic Diet    Condition at Discharge:  Stable  __________________________________________________________________    Disposition  Home or Self Care  ____________________________________________________________________    Code Status:  Full Code  ___________________________________________________________________    Discharge Exam:  Patient seen and examined by me on discharge day. Physical Exam   Constitutional: She is oriented to person, place, and time. She appears well-developed. No distress. Neck: Normal range of motion. Neck supple. Cardiovascular: Normal rate, regular rhythm, normal heart sounds and intact distal pulses. Pulmonary/Chest: Effort normal and breath sounds normal. No respiratory distress. She has no wheezes. Abdominal: Soft. Bowel sounds are normal.   Musculoskeletal: Normal range of motion. Neurological: She is alert and oriented to person, place, and time. Skin: Skin is warm and dry. Psychiatric: She has a normal mood and affect.  Her behavior is normal.        CONSULTATIONS: Pulmonary/Intensive care    Significant Diagnostic Studies:   Recent Results (from the past 24 hour(s))   GLUCOSE, POC    Collection Time: 11/26/20  4:39 PM   Result Value Ref Range    Glucose (POC) 389 (H) 65 - 100 mg/dL    Performed by Romina Hampton POC    Collection Time: 11/26/20  8:21 PM   Result Value Ref Range    Glucose (POC) 437 (H) 65 - 100 mg/dL Performed by Magalys Parikh    GLUCOSE, POC    Collection Time: 11/26/20 10:40 PM   Result Value Ref Range    Glucose (POC) 375 (H) 65 - 100 mg/dL    Performed by [address] [place] [address], POC    Collection Time: 11/27/20  8:24 AM   Result Value Ref Range    Glucose (POC) 244 (H) 65 - 100 mg/dL    Performed by [facility] [employer], POC    Collection Time: 11/27/20 11:18 AM   Result Value Ref Range    Glucose (POC) 353 (H) 65 - 100 mg/dL    Performed by [facility] [employer], POC    Collection Time: 11/27/20  2:53 PM   Result Value Ref Range    Glucose (POC) 448 (H) 65 - 100 mg/dL    Performed by [place] HERNANDES      XR CHEST SNGL V   Final Result      CT CHEST WO CONT    (Results Pending)       Discharge: time spent 35  minutes in discharge  Education and counseling.      Signed:  Bernardo Canas NP  11/27/2020  2:55 PM

## 2020-11-27 NOTE — ROUTINE PROCESS
Bedside and Verbal shift change report given to Linda Mariee (oncoming nurse) by Silvio Gutierrez (offgoing nurse). Report included the following information SBAR and MAR.

## 2020-12-08 ENCOUNTER — TELEPHONE (OUTPATIENT)
Dept: PRIMARY CARE CLINIC | Age: 70
End: 2020-12-08

## 2020-12-09 ENCOUNTER — OFFICE VISIT (OUTPATIENT)
Dept: PRIMARY CARE CLINIC | Age: 70
End: 2020-12-09
Payer: MEDICARE

## 2020-12-09 DIAGNOSIS — E11.9 TYPE 2 DIABETES MELLITUS WITHOUT COMPLICATION, WITHOUT LONG-TERM CURRENT USE OF INSULIN (HCC): ICD-10-CM

## 2020-12-09 DIAGNOSIS — Z09 HOSPITAL DISCHARGE FOLLOW-UP: ICD-10-CM

## 2020-12-09 DIAGNOSIS — E66.01 SEVERE OBESITY (HCC): ICD-10-CM

## 2020-12-09 DIAGNOSIS — M15.9 OSTEOARTHRITIS INVOLVING MULTIPLE JOINTS ON BOTH SIDES OF BODY: ICD-10-CM

## 2020-12-09 DIAGNOSIS — M1A.9XX0 CHRONIC GOUT WITHOUT TOPHUS, UNSPECIFIED CAUSE, UNSPECIFIED SITE: ICD-10-CM

## 2020-12-09 DIAGNOSIS — J45.41 MODERATE PERSISTENT ASTHMA WITH ACUTE EXACERBATION: ICD-10-CM

## 2020-12-09 DIAGNOSIS — E78.2 MIXED HYPERLIPIDEMIA DUE TO TYPE 2 DIABETES MELLITUS (HCC): ICD-10-CM

## 2020-12-09 DIAGNOSIS — E11.69 MIXED HYPERLIPIDEMIA DUE TO TYPE 2 DIABETES MELLITUS (HCC): ICD-10-CM

## 2020-12-09 DIAGNOSIS — J18.9 PNEUMONIA OF BOTH LUNGS DUE TO INFECTIOUS ORGANISM, UNSPECIFIED PART OF LUNG: ICD-10-CM

## 2020-12-09 DIAGNOSIS — U07.1 COVID-19: Primary | ICD-10-CM

## 2020-12-09 DIAGNOSIS — E87.6 HYPOKALEMIA: ICD-10-CM

## 2020-12-09 DIAGNOSIS — I10 ESSENTIAL (PRIMARY) HYPERTENSION: ICD-10-CM

## 2020-12-09 PROCEDURE — 1111F DSCHRG MED/CURRENT MED MERGE: CPT | Performed by: NURSE PRACTITIONER

## 2020-12-09 PROCEDURE — 99214 OFFICE O/P EST MOD 30 MIN: CPT | Performed by: NURSE PRACTITIONER

## 2020-12-09 RX ORDER — FLUTICASONE PROPIONATE AND SALMETEROL 250; 50 UG/1; UG/1
1 POWDER RESPIRATORY (INHALATION) EVERY 12 HOURS
Qty: 1 INHALER | Refills: 5 | Status: SHIPPED | OUTPATIENT
Start: 2020-12-09 | End: 2021-03-05 | Stop reason: ALTCHOICE

## 2020-12-09 RX ORDER — GUAIFENESIN 600 MG/1
600 TABLET, EXTENDED RELEASE ORAL 2 TIMES DAILY
Qty: 60 TAB | Refills: 0 | Status: SHIPPED | OUTPATIENT
Start: 2020-12-09 | End: 2021-03-05 | Stop reason: ALTCHOICE

## 2020-12-09 RX ORDER — BENZONATATE 200 MG/1
200 CAPSULE ORAL
Qty: 30 CAP | Refills: 0 | Status: SHIPPED | OUTPATIENT
Start: 2020-12-09 | End: 2020-12-16

## 2020-12-09 NOTE — TELEPHONE ENCOUNTER
If they did not put her on an antibiotic when she was in the hospital, it will be best for Max Hinton to see her and decide which antibiotic to use.

## 2020-12-14 VITALS — OXYGEN SATURATION: 97 % | RESPIRATION RATE: 22 BRPM | HEART RATE: 72 BPM | TEMPERATURE: 98 F

## 2020-12-14 NOTE — PROGRESS NOTES
Kristine Uribe is a 65 y.o. female who presents to the office today for the following:    Chief Complaint   Patient presents with    Concern For [id number] (Coronavirus)    Pneumonia   [facility] Follow Up       Past Medical History:   Diagnosis Date    Acute sinusitis 11/13/2020    Arthritis     Cyst of ovary 11/13/2020    Essential (primary) hypertension 9/26/2017    Glaucoma 11/30/2018    Gout 8/9/2019    Hypercholesterolemia 11/13/2020    Impacted cerumen 11/13/2020    Mild intermittent asthma, uncomplicated 9/88/2154    Mixed hyperlipidemia due to type 2 diabetes mellitus ([address] 9/26/2017    Painless rectal bleeding 11/30/2018    Type 2 diabetes mellitus without complications ([facility] 9/72/0914       Past Surgical History:   Procedure Laterality Date    HX COLONOSCOPY      HX HERNIA REPAIR  Unknown date    HX KNEE ARTHROSCOPY Right 01/01/2013    HX OTHER SURGICAL  05/16/2017    Eye surgery procedure    HX PARTIAL HYSTERECTOMY  Unknown date        Family History   Problem Relation Age of Onset    Arthritis-osteo Mother     Hypertension Other     Heart Disease Other     Diabetes Other         Social History     Tobacco Use    Smoking status: Former Smoker     Types: Cigarettes    Smokeless tobacco: Never Used    Tobacco comment: At least 30 years ago. Substance Use Topics    Alcohol use: Never     Frequency: Never    Drug use: Never        HPI  Patient here with PMH of asthma, type 2 diabetes, hypertension, hyperlipidemia, gout, and arthritis who is here for hospital follow up after hospitalized at [facility] on 11/22/20-11/27/20. Was hospitalized after she began have progressive sob after diagnosed on 11/17/20 with covid 19. Did not require titrated oxygen during stay and was treated with IV antibiotics, steroids, remedesivir x 5 doses and actrema x 2 doses. Today she reports that shortness of breath is much improved but does continue with some cough and occasional mucus coming up.  Using her albuterol as needed for intermittent wheezing. Appetite is back to baseline along with activity level. Has been taking all of her normal medications. Is scheduled to see a pulmonologist for follow up. Current Outpatient Medications on File Prior to Visit   Medication Sig    albuterol (PROVENTIL VENTOLIN) 2.5 mg /3 mL (0.083 %) nebu 3 mL by Nebulization route every six (6) hours as needed for Wheezing for up to 30 days.  albuterol (PROVENTIL HFA, VENTOLIN HFA, PROAIR HFA) 90 mcg/actuation inhaler Take 1 Puff by inhalation every four (4) hours as needed for Wheezing or Shortness of Breath for up to 30 days.  amLODIPine (NORVASC) 10 mg tablet TAKE 1 TABLET EVERY DAY    allopurinoL (ZYLOPRIM) 100 mg tablet TAKE 1 TABLET EVERY DAY    pravastatin (PRAVACHOL) 40 mg tablet TAKE 1 TABLET EVERY DAY    hydroCHLOROthiazide (HYDRODIURIL) 25 mg tablet TAKE 1 TABLET EVERY DAY    cloNIDine HCL (CATAPRES) 0.2 mg tablet TAKE 1 TABLET TWICE DAILY    metFORMIN (GLUCOPHAGE) 1,000 mg tablet TAKE 1/2 TABLET TWICE A DAY    lisinopriL (PRINIVIL, ZESTRIL) 20 mg tablet TAKE 1 TABLET EVERY DAY    glipiZIDE (GLUCOTROL) 5 mg tablet Take 2 Tabs by mouth two (2) times a day for 180 days. Take 2 TABS by mouth twice daily.  atenoloL (TENORMIN) 50 mg tablet Take 1 Tab by mouth daily. Indications: high blood pressure    diclofenac (VOLTAREN) 1 % gel Apply 2 g to affected area four (4) times daily. As needed for arthritis pain    colchicine (Colcrys) 0.6 mg tablet Take 0.6 mg by mouth two (2) times a day.  True Metrix Glucose Test Strip strip USE TO CHECK BLOOD SUGAR DAILY    TRUEplus Lancets 28 gauge misc USE TO CHECK BLOOD SUGAR DAILY     No current facility-administered medications on file prior to visit. Medications Ordered Today   Medications    fluticasone propion-salmeteroL (ADVAIR/WIXELA) 250-50 mcg/dose diskus inhaler     Sig: Take 1 Puff by inhalation every twelve (12) hours.      Dispense:  1 Inhaler Refill:  5    benzonatate (TESSALON) 200 mg capsule     Sig: Take 1 Cap by mouth three (3) times daily as needed for Cough for up to 7 days. Dispense:  30 Cap     Refill:  0    guaiFENesin ER (MUCINEX) 600 mg ER tablet     Sig: Take 1 Tab by mouth two (2) times a day. Dispense:  60 Tab     Refill:  0        Review of Systems   Constitutional: Negative. HENT: Negative for ear pain, sinus pain and sore throat. Respiratory: Positive for cough, sputum production, shortness of breath and wheezing. Negative for hemoptysis and stridor. Cardiovascular: Negative. Gastrointestinal: Negative. Genitourinary: Negative. Musculoskeletal: Positive for myalgias. Neurological: Negative. Visit Vitals  Pulse 72   Temp 98 °F (36.7 °C)   Resp 22   SpO2 97%       Physical Exam  Vitals signs and nursing note reviewed. Constitutional:       Appearance: Normal appearance. She is obese. Eyes:      Pupils: Pupils are equal, round, and reactive to light. Cardiovascular:      Rate and Rhythm: Normal rate and regular rhythm. Pulses: Normal pulses. Heart sounds: Normal heart sounds. Pulmonary:      Effort: Pulmonary effort is normal. No respiratory distress. Breath sounds: No stridor. Wheezing (faint on expiraton) present. No rhonchi or rales. Abdominal:      General: Bowel sounds are normal.      Palpations: Abdomen is soft. Musculoskeletal: Normal range of motion. Skin:     General: Skin is warm and dry. Neurological:      Mental Status: She is alert and oriented to person, place, and time. Mental status is at baseline. 1. COVID-19  Is recovering with sob improved and no further fever  Did not require oxygen titration and this is normal today    2. Moderate persistent asthma with acute exacerbation  No significant wheezing on exam today but she did use nebulizer prior to appointment for reported wheezing.    She reports she was using albuterol frequently even prior to covid 19 dx with use > 3 times per week  Will start on advair as directed. Advised to rinse mouth after each use  May continue albuterol inhaler/nebs prn for wheezing/sob  She has appointment with pulmonologist upcoming   - fluticasone propion-salmeteroL (ADVAIR/WIXELA) 250-50 mcg/dose diskus inhaler; Take 1 Puff by inhalation every twelve (12) hours. Dispense: 1 Inhaler; Refill: 5    3. Pneumonia of both lungs due to infectious organism, unspecified part of lung  Overall patient symptoms are improved   CXR done on 11/22/20 showed Hazy reticular markings through the mid and lower lungs  She was treated with IV antibiotics as well as completed azithromycin  She has some residual cough and intermittent wheezing likely related to inflammation from covid 19 and h/o asthma which appears uncontrolled even prior to covid dx  Will prescribe mucinex to use prn as reporting increased mucus  Also prescribe tessalon pearls to use prn for cough  Encouraged to continue supportive care with rest, increased fluids, tylenol prn for pain or fever and cool mist humidifier  - benzonatate (TESSALON) 200 mg capsule; Take 1 Cap by mouth three (3) times daily as needed for Cough for up to 7 days. Dispense: 30 Cap; Refill: 0  - guaiFENesin ER (MUCINEX) 600 mg ER tablet; Take 1 Tab by mouth two (2) times a day. Dispense: 60 Tab; Refill: 0    4. Mixed hyperlipidemia due to type 2 diabetes mellitus ([facility]  This has been stable and continue pravastatin as directed    5. Type 2 diabetes mellitus without complication, without long-term current use of insulin ([facility])  Last A1c was 7.6 on 11/2020  She remains on metformin and glipizide as directed  Reports sugars under 150 fasting and will notify office if staying above 250 or less than 70  She will continue her regular follow up with Dr. Dawna Hackett     6.  Essential (primary) hypertension  Blood pressure has been stable at home and encourage to continue to monitor  Notify provider if staying above 140/90 consistently and take all medicines as directed    7. Osteoarthritis involving multiple joints on both sides of body  This is stable and continue diclofenac topical prn    8. Hypokalemia  This is resolved    9. Severe obesity ([address]  Continue to encourage weight loss. Recommend decreasing excess fat, salt and sugar in diet along with getting regular exercise 3-5 times weekly     10. Chronic gout without tophus, unspecified cause, unspecified site  This is stable and continues medication as directed      Patient verbalizes understanding of plan of care as discussed above    Follow-up and Dispositions    · Return in about 3 months (around 3/9/2021), or if symptoms worsen or fail to improve.

## 2021-01-04 DIAGNOSIS — E11.9 TYPE 2 DIABETES MELLITUS WITHOUT COMPLICATION, WITHOUT LONG-TERM CURRENT USE OF INSULIN (HCC): Primary | ICD-10-CM

## 2021-01-04 RX ORDER — CALCIUM CITRATE/VITAMIN D3 200MG-6.25
TABLET ORAL
Qty: 100 STRIP | Refills: 1 | Status: SHIPPED | OUTPATIENT
Start: 2021-01-04 | End: 2021-05-24

## 2021-01-18 RX ORDER — AMLODIPINE BESYLATE 10 MG/1
TABLET ORAL
Qty: 90 TAB | Refills: 0 | Status: SHIPPED | OUTPATIENT
Start: 2021-01-18 | End: 2021-03-28

## 2021-02-03 RX ORDER — ALLOPURINOL 100 MG/1
TABLET ORAL
Qty: 90 TAB | Refills: 0 | Status: SHIPPED | OUTPATIENT
Start: 2021-02-03 | End: 2021-05-04

## 2021-02-03 RX ORDER — ISOPROPYL ALCOHOL 0.75 G/1
SWAB TOPICAL
Qty: 100 PAD | Refills: 1 | Status: SHIPPED | OUTPATIENT
Start: 2021-02-03 | End: 2021-06-25

## 2021-02-10 ENCOUNTER — HOSPITAL ENCOUNTER (OUTPATIENT)
Dept: GENERAL RADIOLOGY | Age: 71
Discharge: HOME | End: 2021-02-10
Payer: MEDICARE

## 2021-02-10 ENCOUNTER — TRANSCRIBE ORDER (OUTPATIENT)
Dept: REGISTRATION | Age: 71
End: 2021-02-10

## 2021-02-10 DIAGNOSIS — R05.9 COUGH: Primary | ICD-10-CM

## 2021-02-10 DIAGNOSIS — R05.9 COUGH: ICD-10-CM

## 2021-02-10 PROCEDURE — 71046 X-RAY EXAM CHEST 2 VIEWS: CPT

## 2021-02-11 DIAGNOSIS — I10 ESSENTIAL (PRIMARY) HYPERTENSION: ICD-10-CM

## 2021-02-13 RX ORDER — LISINOPRIL 20 MG/1
TABLET ORAL
Qty: 90 TAB | Refills: 0 | Status: SHIPPED | OUTPATIENT
Start: 2021-02-13 | End: 2021-04-26

## 2021-02-13 RX ORDER — HYDROCHLOROTHIAZIDE 25 MG/1
TABLET ORAL
Qty: 90 TAB | Refills: 0 | Status: SHIPPED | OUTPATIENT
Start: 2021-02-13 | End: 2021-04-26

## 2021-02-13 RX ORDER — GLIPIZIDE 5 MG/1
TABLET ORAL
Qty: 360 TAB | Refills: 0 | Status: SHIPPED | OUTPATIENT
Start: 2021-02-13 | End: 2021-04-26

## 2021-02-13 RX ORDER — CLONIDINE HYDROCHLORIDE 0.2 MG/1
TABLET ORAL
Qty: 180 TAB | Refills: 0 | Status: SHIPPED | OUTPATIENT
Start: 2021-02-13 | End: 2021-03-05 | Stop reason: ALTCHOICE

## 2021-02-13 RX ORDER — PRAVASTATIN SODIUM 40 MG/1
TABLET ORAL
Qty: 90 TAB | Refills: 0 | Status: SHIPPED | OUTPATIENT
Start: 2021-02-13 | End: 2021-04-26

## 2021-02-13 RX ORDER — ATENOLOL 50 MG/1
TABLET ORAL
Qty: 90 TAB | Refills: 0 | Status: SHIPPED | OUTPATIENT
Start: 2021-02-13 | End: 2021-04-26

## 2021-02-13 RX ORDER — METFORMIN HYDROCHLORIDE 1000 MG/1
TABLET ORAL
Qty: 90 TAB | Refills: 0 | Status: SHIPPED | OUTPATIENT
Start: 2021-02-13 | End: 2021-03-05 | Stop reason: ALTCHOICE

## 2021-02-16 NOTE — TELEPHONE ENCOUNTER
Will have MICKEY Barragan or MICKEY Daniels schedule an appointment with patient in office with labs per Dr. Mari Roman.

## 2021-03-04 ENCOUNTER — TELEPHONE (OUTPATIENT)
Dept: PRIMARY CARE CLINIC | Age: 71
End: 2021-03-04

## 2021-03-04 RX ORDER — ALBUTEROL SULFATE 0.83 MG/ML
SOLUTION RESPIRATORY (INHALATION)
Qty: 300 ML | Refills: 0 | Status: SHIPPED | OUTPATIENT
Start: 2021-03-04 | End: 2021-05-11 | Stop reason: SDUPTHER

## 2021-03-04 NOTE — TELEPHONE ENCOUNTER
Patient requesting partial RX for metformin be sent to [facility]. Has not arrived from [place] and she is completely out.

## 2021-03-05 ENCOUNTER — OFFICE VISIT (OUTPATIENT)
Dept: PRIMARY CARE CLINIC | Age: 71
End: 2021-03-05
Payer: MEDICARE

## 2021-03-05 ENCOUNTER — TELEPHONE (OUTPATIENT)
Dept: PRIMARY CARE CLINIC | Age: 71
End: 2021-03-05

## 2021-03-05 VITALS
WEIGHT: 232 LBS | OXYGEN SATURATION: 98 % | DIASTOLIC BLOOD PRESSURE: 72 MMHG | RESPIRATION RATE: 20 BRPM | HEART RATE: 96 BPM | BODY MASS INDEX: 37.45 KG/M2 | SYSTOLIC BLOOD PRESSURE: 145 MMHG | TEMPERATURE: 97.2 F

## 2021-03-05 DIAGNOSIS — M1A.9XX0 CHRONIC GOUT WITHOUT TOPHUS, UNSPECIFIED CAUSE, UNSPECIFIED SITE: ICD-10-CM

## 2021-03-05 DIAGNOSIS — E66.01 SEVERE OBESITY (HCC): ICD-10-CM

## 2021-03-05 DIAGNOSIS — E11.69 MIXED HYPERLIPIDEMIA DUE TO TYPE 2 DIABETES MELLITUS (HCC): ICD-10-CM

## 2021-03-05 DIAGNOSIS — E11.9 TYPE 2 DIABETES MELLITUS WITHOUT COMPLICATION, WITHOUT LONG-TERM CURRENT USE OF INSULIN (HCC): Primary | ICD-10-CM

## 2021-03-05 DIAGNOSIS — E78.2 MIXED HYPERLIPIDEMIA DUE TO TYPE 2 DIABETES MELLITUS (HCC): ICD-10-CM

## 2021-03-05 DIAGNOSIS — M15.9 OSTEOARTHRITIS INVOLVING MULTIPLE JOINTS ON BOTH SIDES OF BODY: ICD-10-CM

## 2021-03-05 DIAGNOSIS — I10 ESSENTIAL (PRIMARY) HYPERTENSION: ICD-10-CM

## 2021-03-05 DIAGNOSIS — H40.9 GLAUCOMA OF BOTH EYES, UNSPECIFIED GLAUCOMA TYPE: ICD-10-CM

## 2021-03-05 PROBLEM — H61.20 IMPACTED CERUMEN: Status: RESOLVED | Noted: 2020-11-13 | Resolved: 2021-03-05

## 2021-03-05 PROBLEM — K62.5 PAINLESS RECTAL BLEEDING: Status: RESOLVED | Noted: 2018-11-30 | Resolved: 2021-03-05

## 2021-03-05 PROBLEM — U07.1 COVID-19: Status: RESOLVED | Noted: 2020-11-22 | Resolved: 2021-03-05

## 2021-03-05 PROBLEM — E87.6 HYPOKALEMIA: Status: RESOLVED | Noted: 2020-11-22 | Resolved: 2021-03-05

## 2021-03-05 LAB — HBA1C MFR BLD HPLC: 9.4 %

## 2021-03-05 PROCEDURE — G8427 DOCREV CUR MEDS BY ELIG CLIN: HCPCS | Performed by: NURSE PRACTITIONER

## 2021-03-05 PROCEDURE — 3046F HEMOGLOBIN A1C LEVEL >9.0%: CPT | Performed by: NURSE PRACTITIONER

## 2021-03-05 PROCEDURE — G9899 SCRN MAM PERF RSLTS DOC: HCPCS | Performed by: NURSE PRACTITIONER

## 2021-03-05 PROCEDURE — G8432 DEP SCR NOT DOC, RNG: HCPCS | Performed by: NURSE PRACTITIONER

## 2021-03-05 PROCEDURE — G8536 NO DOC ELDER MAL SCRN: HCPCS | Performed by: NURSE PRACTITIONER

## 2021-03-05 PROCEDURE — G8753 SYS BP > OR = 140: HCPCS | Performed by: NURSE PRACTITIONER

## 2021-03-05 PROCEDURE — 83036 HEMOGLOBIN GLYCOSYLATED A1C: CPT | Performed by: NURSE PRACTITIONER

## 2021-03-05 PROCEDURE — 3017F COLORECTAL CA SCREEN DOC REV: CPT | Performed by: NURSE PRACTITIONER

## 2021-03-05 PROCEDURE — 99214 OFFICE O/P EST MOD 30 MIN: CPT | Performed by: NURSE PRACTITIONER

## 2021-03-05 PROCEDURE — G8754 DIAS BP LESS 90: HCPCS | Performed by: NURSE PRACTITIONER

## 2021-03-05 PROCEDURE — G8417 CALC BMI ABV UP PARAM F/U: HCPCS | Performed by: NURSE PRACTITIONER

## 2021-03-05 PROCEDURE — 2022F DILAT RTA XM EVC RTNOPTHY: CPT | Performed by: NURSE PRACTITIONER

## 2021-03-05 PROCEDURE — 1101F PT FALLS ASSESS-DOCD LE1/YR: CPT | Performed by: NURSE PRACTITIONER

## 2021-03-05 PROCEDURE — 1090F PRES/ABSN URINE INCON ASSESS: CPT | Performed by: NURSE PRACTITIONER

## 2021-03-05 PROCEDURE — G8400 PT W/DXA NO RESULTS DOC: HCPCS | Performed by: NURSE PRACTITIONER

## 2021-03-05 RX ORDER — METFORMIN HYDROCHLORIDE 1000 MG/1
1000 TABLET ORAL 2 TIMES DAILY WITH MEALS
Qty: 30 TAB | Refills: 0 | Status: SHIPPED | OUTPATIENT
Start: 2021-03-05 | End: 2021-03-06 | Stop reason: SDUPTHER

## 2021-03-05 RX ORDER — TIMOLOL MALEATE 5 MG/ML
SOLUTION/ DROPS OPHTHALMIC
COMMUNITY
Start: 2020-12-27 | End: 2021-03-05 | Stop reason: ALTCHOICE

## 2021-03-05 RX ORDER — METFORMIN HYDROCHLORIDE 1000 MG/1
TABLET ORAL
Qty: 90 TAB | Refills: 0 | Status: CANCELLED | OUTPATIENT
Start: 2021-03-05

## 2021-03-05 RX ORDER — FLUTICASONE FUROATE, UMECLIDINIUM BROMIDE AND VILANTEROL TRIFENATATE 100; 62.5; 25 UG/1; UG/1; UG/1
1 POWDER RESPIRATORY (INHALATION) DAILY
COMMUNITY
Start: 2021-02-03 | End: 2021-08-03 | Stop reason: ALTCHOICE

## 2021-03-05 RX ORDER — CLONIDINE HYDROCHLORIDE 0.2 MG/1
0.1 TABLET ORAL DAILY
COMMUNITY
End: 2021-04-26

## 2021-03-05 RX ORDER — DORZOLAMIDE HCL 20 MG/ML
1 SOLUTION/ DROPS OPHTHALMIC 3 TIMES DAILY
COMMUNITY
Start: 2021-01-26

## 2021-03-05 RX ORDER — LATANOPROST 50 UG/ML
2 SOLUTION/ DROPS OPHTHALMIC 3 TIMES DAILY
COMMUNITY
Start: 2020-12-27

## 2021-03-05 RX ORDER — BRIMONIDINE TARTRATE 2 MG/ML
1 SOLUTION/ DROPS OPHTHALMIC 3 TIMES DAILY
COMMUNITY
Start: 2020-12-27

## 2021-03-05 NOTE — PROGRESS NOTES
Andreas Mendez is a 66 y.o. female who presents to the office today for the following:    Chief Complaint   Patient presents with    Medication Refill    Diabetes    Asthma    Hypertension    Osteoarthritis       Past Medical History:   Diagnosis Date    Acute sinusitis 11/13/2020    Arthritis     COVID-19 11/22/2020    Cyst of ovary 11/13/2020    Essential (primary) hypertension 9/26/2017    Glaucoma 11/30/2018    Gout 8/9/2019    Hypercholesterolemia 11/13/2020    Impacted cerumen 11/13/2020    Mild intermittent asthma, uncomplicated 6/35/8726    Mixed hyperlipidemia due to type 2 diabetes mellitus ([facility] [address] 9/26/2017    Painless rectal bleeding 11/30/2018    Type 2 diabetes mellitus without complications ([facility] [address] 3/98/0063       Past Surgical History:   Procedure Laterality Date    HX COLONOSCOPY      HX HERNIA REPAIR  Unknown date    HX KNEE ARTHROSCOPY Right 01/01/2013    HX OTHER SURGICAL  05/16/2017    Eye surgery procedure    HX PARTIAL HYSTERECTOMY  Unknown date        Family History   Problem Relation Age of Onset    Arthritis-osteo Mother     Hypertension Other     Heart Disease Other     Diabetes Other         Social History     Tobacco Use    Smoking status: Former Smoker     Types: Cigarettes    Smokeless tobacco: Never Used    Tobacco comment: At least 30 years ago. Substance Use Topics    Alcohol use: Never     Frequency: Never    Drug use: Never        HPI  Patient here with PMH of asthma, severe covid 23, type 2 diabetes, hypertension, hyperlipidemia, gout, glaucoma and arthritis. Reports that she is doing well overall. Seeing pulmonologist since she was diagnosed in 2020 with covid 19 which required hospitalization. Does have h/o asthma. States that she was started on trelegy and this seems to have helped breathing. Also following with ophthalmology for glaucoma. States she has been taking all of her medications as prescribed.      Current Outpatient Medications on File Prior to Visit   Medication Sig    Trelegy Ellipta 100-62.5-25 mcg inhaler 1 Puff daily.  brimonidine (ALPHAGAN) 0.2 % ophthalmic solution Administer 1 Drop to left eye three (3) times daily.  dorzolamide (TRUSOPT) 2 % ophthalmic solution Administer 1 Drop to left eye three (3) times daily.  latanoprost (XALATAN) 0.005 % ophthalmic solution Administer 2 Drops to both eyes three (3) times daily.  cloNIDine HCL (CATAPRES) 0.2 mg tablet Take 0.1 mg by mouth daily.  albuterol (PROVENTIL VENTOLIN) 2.5 mg /3 mL (0.083 %) nebu USE 1 VIAL IN NEBULIZER 4 TIMES DAILY AS NEEDED    glipiZIDE (GLUCOTROL) 5 mg tablet TAKE 2 TABLETS TWICE DAILY    atenoloL (TENORMIN) 50 mg tablet TAKE 1 TABLET EVERY DAY FOR HIGH BLOOD PRESSURE    lisinopriL (PRINIVIL, ZESTRIL) 20 mg tablet TAKE 1 TABLET EVERY DAY    hydroCHLOROthiazide (HYDRODIURIL) 25 mg tablet TAKE 1 TABLET EVERY DAY    pravastatin (PRAVACHOL) 40 mg tablet TAKE 1 TABLET EVERY DAY    BD Single Use Swabs Regular padm USE  TO TEST BLOOD SUGAR EVERY DAY    allopurinoL (ZYLOPRIM) 100 mg tablet TAKE 1 TABLET EVERY DAY    amLODIPine (NORVASC) 10 mg tablet TAKE 1 TABLET EVERY DAY    True Metrix Glucose Test Strip strip USE TO CHECK BLOOD SUGAR DAILY    TRUEplus Lancets 28 gauge misc USE TO CHECK BLOOD SUGAR DAILY     No current facility-administered medications on file prior to visit. Medications Ordered Today   Medications    SITagliptin (JANUVIA) 50 mg tablet     Sig: Take 1 Tab by mouth daily. Dispense:  30 Tab     Refill:  0    DISCONTD: metFORMIN (GLUCOPHAGE) 1,000 mg tablet     Sig: Take 1 Tab by mouth two (2) times daily (with meals). Dispense:  30 Tab     Refill:  0        Review of Systems   Constitutional: Negative. HENT: Negative. Eyes: Negative. Respiratory: Positive for shortness of breath (occasional ) and wheezing (occasional ). Negative for cough, hemoptysis and sputum production. Cardiovascular: Negative. Gastrointestinal: Negative. Genitourinary: Negative. Musculoskeletal: Positive for joint pain and myalgias. Negative for falls and neck pain. Skin: Negative. Neurological: Negative. Psychiatric/Behavioral: Negative. Visit Vitals  BP (!) 145/72 (BP 1 Location: Left upper arm, BP Patient Position: Sitting, BP Cuff Size: Large adult)   Pulse 96   Temp 97.2 °F (36.2 °C) (Tympanic)   Resp 20   Wt 232 lb (105.2 kg)   SpO2 98%   BMI 37.45 kg/m²       Physical Exam  Vitals signs and nursing note reviewed. Constitutional:       Appearance: Normal appearance. She is obese. Eyes:      Pupils: Pupils are equal, round, and reactive to light. Neck:      Vascular: No carotid bruit. Cardiovascular:      Rate and Rhythm: Normal rate and regular rhythm. Pulses: Normal pulses. Heart sounds: Normal heart sounds. Pulmonary:      Effort: Pulmonary effort is normal. No respiratory distress. Breath sounds: No stridor. No wheezing (faint on expiraton), rhonchi or rales. Abdominal:      General: Bowel sounds are normal.      Palpations: Abdomen is soft. Musculoskeletal: Normal range of motion. Right lower leg: No edema. Left lower leg: No edema. Lymphadenopathy:      Cervical: No cervical adenopathy. Skin:     General: Skin is warm and dry. Neurological:      Mental Status: She is alert and oriented to person, place, and time. Mental status is at baseline.    Psychiatric:         Mood and Affect: Mood normal.         Behavior: Behavior normal.            1. Type 2 diabetes mellitus without complication, without long-term current use of insulin (HCC)  Last A1c was 7.6 on 11/2020 and is up to 9.4 today  She remains on metformin 1000mg twice daily  and glipizide 10mg twice daily  as directed  No blood sugar readings available today but reports sometimes near 200 when checking  Admits not recently monitoring diet or getting regular exercise    Will continue on metformin and glipizide as directed. Add Januvia 50mg daily. Reviewed potential side effects of medicine  Check sugars 1-2 times daily and bring readings to next appointment   Encourage following diabetic diet and getting regular exercise 3-5 times weekly   Notify provider if sugars <70 or > 350   Labs up to date in 11/2020   She follows regular with eye doctor  Plan to re-evaluate in 1 month   - AMB POC HEMOGLOBIN A1C    4. Mixed hyperlipidemia due to type 2 diabetes mellitus ([address]  This has been stable and continue pravastatin as directed    2. Moderate intermittent asthma without complication  Symptoms have been stable and currently on trelegy as directed  Has albuterol inhaler to use prn for wheezing and uses less than twice weekly  Follows with pulmonology    6. Essential (primary) hypertension  Blood pressure has been stable at home and encourage to continue to monitor  Notify provider if staying above 140/90 consistently and take all medicines as directed    7. Osteoarthritis involving multiple joints on both sides of body  This is stable and continue diclofenac topical prn    9. Severe obesity ([facility] [address]  Continue to encourage weight loss with diet and exercise as discussed above    10. Chronic gout without tophus, unspecified cause, unspecified site  This is stable and continues medication as directed    8. Glaucoma of both eyes, unspecified glaucoma type  This is stable and continue eye drops as directed  Follows with ophthalmology     Patient verbalizes understanding of plan of care as discussed above    Follow-up and Dispositions    · Return in about 4 weeks (around 4/2/2021) for or sooner for worsening symptoms.

## 2021-03-05 NOTE — TELEPHONE ENCOUNTER
Pharmacist from [facility] [employer] called and asked if you could send in a RX for pt's Metformin #60 to get her thru until her mailorder came in. He stated she is on Metformin 1000mg BID. I also called the patient and asked her the dose and she told me Metformin 1000mg BID. She has an appointment coming up 03/23/2021.

## 2021-03-05 NOTE — TELEPHONE ENCOUNTER
Appt time to get her vaccine is 1 and she said her sugar is really high and she wants to know if she should still get the vaccine or not and what to do with her sugar because she has not received her metformin from [facility] yet-Please call pt

## 2021-03-05 NOTE — TELEPHONE ENCOUNTER
Spoke with patient again about her Metformin. Informed her that she could call the location that she is supposed to get the vaccine at and inform them of what is going on but I could not tell her what to do. She was given an appointment to see KAYLAH Kenny NP this afternoon.

## 2021-03-06 DIAGNOSIS — E11.9 TYPE 2 DIABETES MELLITUS WITHOUT COMPLICATION, WITHOUT LONG-TERM CURRENT USE OF INSULIN (HCC): Primary | ICD-10-CM

## 2021-03-06 RX ORDER — METFORMIN HYDROCHLORIDE 1000 MG/1
1000 TABLET ORAL 2 TIMES DAILY WITH MEALS
Qty: 30 TAB | Refills: 0 | Status: SHIPPED | OUTPATIENT
Start: 2021-03-06 | End: 2021-04-22 | Stop reason: SDUPTHER

## 2021-03-16 PROBLEM — M19.90 ARTHRITIS: Status: RESOLVED | Noted: 2020-11-13 | Resolved: 2021-03-16

## 2021-03-16 PROBLEM — N83.209 CYST OF OVARY: Status: RESOLVED | Noted: 2020-11-13 | Resolved: 2021-03-16

## 2021-03-16 PROBLEM — J01.90 ACUTE SINUSITIS: Status: RESOLVED | Noted: 2020-11-13 | Resolved: 2021-03-16

## 2021-03-16 PROBLEM — J18.9 PNEUMONIA: Status: RESOLVED | Noted: 2020-11-22 | Resolved: 2021-03-16

## 2021-03-23 ENCOUNTER — OFFICE VISIT (OUTPATIENT)
Dept: PRIMARY CARE CLINIC | Age: 71
End: 2021-03-23
Payer: MEDICARE

## 2021-03-23 VITALS
TEMPERATURE: 96.8 F | DIASTOLIC BLOOD PRESSURE: 72 MMHG | HEIGHT: 66 IN | OXYGEN SATURATION: 96 % | RESPIRATION RATE: 16 BRPM | SYSTOLIC BLOOD PRESSURE: 143 MMHG | BODY MASS INDEX: 37.52 KG/M2 | WEIGHT: 233.5 LBS | HEART RATE: 76 BPM

## 2021-03-23 DIAGNOSIS — E11.69 MIXED HYPERLIPIDEMIA DUE TO TYPE 2 DIABETES MELLITUS (HCC): ICD-10-CM

## 2021-03-23 DIAGNOSIS — E11.9 TYPE 2 DIABETES MELLITUS WITHOUT COMPLICATION, WITHOUT LONG-TERM CURRENT USE OF INSULIN (HCC): ICD-10-CM

## 2021-03-23 DIAGNOSIS — E78.2 MIXED HYPERLIPIDEMIA DUE TO TYPE 2 DIABETES MELLITUS (HCC): ICD-10-CM

## 2021-03-23 DIAGNOSIS — Z78.0 ASYMPTOMATIC MENOPAUSE: ICD-10-CM

## 2021-03-23 DIAGNOSIS — J45.20 MILD INTERMITTENT ASTHMA, UNCOMPLICATED: ICD-10-CM

## 2021-03-23 DIAGNOSIS — E66.01 SEVERE OBESITY (HCC): ICD-10-CM

## 2021-03-23 DIAGNOSIS — Z11.59 ENCOUNTER FOR HEPATITIS C SCREENING TEST FOR LOW RISK PATIENT: ICD-10-CM

## 2021-03-23 DIAGNOSIS — Z12.31 ENCOUNTER FOR SCREENING MAMMOGRAM FOR MALIGNANT NEOPLASM OF BREAST: ICD-10-CM

## 2021-03-23 DIAGNOSIS — M1A.9XX0 CHRONIC GOUT WITHOUT TOPHUS, UNSPECIFIED CAUSE, UNSPECIFIED SITE: ICD-10-CM

## 2021-03-23 DIAGNOSIS — Z00.00 ENCOUNTER FOR ANNUAL WELLNESS VISIT (AWV) IN MEDICARE PATIENT: Primary | ICD-10-CM

## 2021-03-23 DIAGNOSIS — I10 ESSENTIAL (PRIMARY) HYPERTENSION: ICD-10-CM

## 2021-03-23 PROCEDURE — 3017F COLORECTAL CA SCREEN DOC REV: CPT | Performed by: FAMILY MEDICINE

## 2021-03-23 PROCEDURE — G8754 DIAS BP LESS 90: HCPCS | Performed by: FAMILY MEDICINE

## 2021-03-23 PROCEDURE — G0439 PPPS, SUBSEQ VISIT: HCPCS | Performed by: FAMILY MEDICINE

## 2021-03-23 PROCEDURE — G9899 SCRN MAM PERF RSLTS DOC: HCPCS | Performed by: FAMILY MEDICINE

## 2021-03-23 PROCEDURE — G8400 PT W/DXA NO RESULTS DOC: HCPCS | Performed by: FAMILY MEDICINE

## 2021-03-23 PROCEDURE — G8753 SYS BP > OR = 140: HCPCS | Performed by: FAMILY MEDICINE

## 2021-03-23 PROCEDURE — 99214 OFFICE O/P EST MOD 30 MIN: CPT | Performed by: FAMILY MEDICINE

## 2021-03-23 PROCEDURE — G8510 SCR DEP NEG, NO PLAN REQD: HCPCS | Performed by: FAMILY MEDICINE

## 2021-03-23 PROCEDURE — G8417 CALC BMI ABV UP PARAM F/U: HCPCS | Performed by: FAMILY MEDICINE

## 2021-03-23 PROCEDURE — 2022F DILAT RTA XM EVC RTNOPTHY: CPT | Performed by: FAMILY MEDICINE

## 2021-03-23 PROCEDURE — 3046F HEMOGLOBIN A1C LEVEL >9.0%: CPT | Performed by: FAMILY MEDICINE

## 2021-03-23 PROCEDURE — 1101F PT FALLS ASSESS-DOCD LE1/YR: CPT | Performed by: FAMILY MEDICINE

## 2021-03-23 PROCEDURE — G8536 NO DOC ELDER MAL SCRN: HCPCS | Performed by: FAMILY MEDICINE

## 2021-03-23 PROCEDURE — 1090F PRES/ABSN URINE INCON ASSESS: CPT | Performed by: FAMILY MEDICINE

## 2021-03-23 PROCEDURE — G8427 DOCREV CUR MEDS BY ELIG CLIN: HCPCS | Performed by: FAMILY MEDICINE

## 2021-03-23 NOTE — PATIENT INSTRUCTIONS
Advance Directives: Care Instructions 
Overview 
An advance directive is a legal way to state your wishes at the end of your life. It tells your family and your doctor what to do if you can't say what you want. 
There are two main types of advance directives. You can change them any time your wishes change. 
Living will.  
This form tells your family and your doctor your wishes about life support and other treatment. The form is also called a declaration.  
Medical power of .  
This form lets you name a person to make treatment decisions for you when you can't speak for yourself. This person is called a health care agent (health care proxy, health care surrogate). The form is also called a durable power of  for health care. 
If you do not have an advance directive, decisions about your medical care may be made by a family member, or by a doctor or a  who doesn't know you. 
It may help to think of an advance directive as a gift to the people who care for you. If you have one, they won't have to make tough decisions by themselves. 
Follow-up care is a key part of your treatment and safety. Be sure to make and go to all appointments, and call your doctor if you are having problems. It's also a good idea to know your test results and keep a list of the medicines you take. 
What should you include in an advance directive? 
Many states have a unique advance directive form. (It may ask you to address specific issues.) Or you might use a universal form that's approved by many states. 
If your form doesn't tell you what to address, it may be hard to know what to include in your advance directive. Use the questions below to help you get started. 
· Who do you want to make decisions about your medical care if you are not able to? 
· What life-support measures do you want if you have a serious illness that gets worse over time or can't be cured? 
· What are you most afraid of that might happen? (Maybe  you're afraid of having pain, losing your independence, or being kept alive by machines.) · Where would you prefer to die? (Your home? A hospital? A nursing home?) · Do you want to donate your organs when you die? · Do you want certain Baptism practices performed before you die? When should you call for help? Be sure to contact your doctor if you have any questions. Where can you learn more? Go to http://www.gray.com/ Enter R264 in the search box to learn more about \"Advance Directives: Care Instructions. \" Current as of: December 9, 2019               Content Version: 12.6 © 3867-2365 [employer]. Care instructions adapted under license by [employer] (which disclaims liability or warranty for this information). If you have questions about a medical condition or this instruction, always ask your healthcare professional. [place] [address] any warranty or liability for your use of this information. Valentine Z[address]ta [address] What is a living will? A living will, also called a declaration, is a legal form. It tells your family and your doctor your wishes when you can't speak for yourself. It's used by the health professionals who will treat you as you near the end of your life or if you get seriously hurt or ill. If you put your wishes in writing, your loved ones and others will know what kind of care you want. They won't need to guess. This can ease your mind and be helpful to others. And you can change or cancel your living will at any time. A living will is not the same as an estate or property will. An estate will explains what you want to happen with your money and property after you die. How do you use it? A living will is used to describe the kinds of treatment or life support you want as you near the end of your life or if you get seriously hurt or ill. Keep these facts in mind about living curtis.  
· Your living will is used only if you can't speak or make decisions for yourself. Most often, one or more doctors must certify that you can't speak or decide for yourself before your living will takes effect. · If you get better and can speak for yourself again, you can accept or refuse any treatment. It doesn't matter what you said in your living will. · Some states may limit your right to refuse treatment in certain cases. For example, you may need to clearly state in your living will that you don't want artificial hydration and nutrition, such as being fed through a tube. Is a living will a legal document? A living will is a legal document. Each state has its own laws about living curtis. And a living will may be called something else in your state. Here are some things to know about living curtis. · You don't need an  to complete a living will. But legal advice can be helpful if your state's laws are unclear. It can also help if your health history is complicated or your family can't agree on what should be in your living will. · You can change your living will at any time. Some people find that their wishes about end-of-life care change as their health changes. If you make big changes to your living will, complete a new form. · If you move to another state, make sure that your living will is legal in the state where you now live. In most cases, doctors will respect your wishes even if you have a form from a different state. · You might use a universal form that has been approved by many states. This kind of form can sometimes be filled out and stored online. Your digital copy will then be available wherever you have a connection to the internet. The doctors and nurses who need to treat you can find it right away. · Your state may offer an online registry. This is another place where you can store your living will online. · It's a good idea to get your living will notarized.  This means using a person called a  to watch two people sign, or witness, your living will. What should you know when you create a living will? Here are some questions to ask yourself as you make your living will: · Do you know enough about life support methods that might be used? If not, talk to your doctor so you know what might be done if you can't breathe on your own, your heart stops, or you can't swallow. · What things would you still want to be able to do after you receive life-support methods? Would you want to be able to walk? To speak? To eat on your own? To live without the help of machines? · Do you want certain [place] practices performed if you become very ill? · If you have a choice, where do you want to be cared for? In your home? At a hospital or nursing home? · If you have a choice at the end of your life, where would you prefer to die? At home? In a hospital or nursing home? Somewhere else? · Would you prefer to be buried or cremated? · Do you want your organs to be donated after you die? What should you do with your living will? · Make sure that your family members and your health care agent have copies of your living will (also called a declaration). · Give your doctor a copy of your living will. Ask him or her to keep it as part of your medical record. If you have more than one doctor, make sure that each one has a copy. · Put a copy of your living will where it can be easily found. For example, some people may put a copy on their refrigerator door. If you are using a digital copy, be sure your doctor, family members, and health care agent know how to find and access it. Where can you learn more? Go to http://www.gray.com/ Enter K173 in the search box to learn more about \"Learning About Living Perroy. \" Current as of: December 9, 2019               Content Version: 12.6 © 3093-2093 [employer].   
Care instructions adapted under license by [facility] (which disclaims liability or warranty for this information). If you have questions about a medical condition or this instruction, always ask your healthcare professional. [place] [address] any warranty or liability for your use of this information. Well Visit, Over 72: Care Instructions Your Care Instructions Physical exams can help you stay healthy. Your doctor has checked your overall health and may have suggested ways to take good care of yourself. He or she also may have recommended tests. At home, you can help prevent illness with healthy eating, regular exercise, and other steps. Follow-up care is a key part of your treatment and safety. Be sure to make and go to all appointments, and call your doctor if you are having problems. It's also a good idea to know your test results and keep a list of the medicines you take. How can you care for yourself at home? · Reach and stay at a healthy weight. This will lower your risk for many problems, such as obesity, diabetes, heart disease, and high blood pressure. · Get at least 30 minutes of exercise on most days of the week. Walking is a good choice. You also may want to do other activities, such as running, swimming, cycling, or playing tennis or team sports. · Do not smoke. Smoking can make health problems worse. If you need help quitting, talk to your doctor about stop-smoking programs and medicines. These can increase your chances of quitting for good. · Protect your skin from too much sun. When you're outdoors from 10 a.m. to 4 p.m., stay in the shade or cover up with clothing and a hat with a wide brim. Wear sunglasses that block UV rays. Even when it's cloudy, put broad-spectrum sunscreen (SPF 30 or higher) on any exposed skin. · See a dentist one or two times a year for checkups and to have your teeth cleaned. · Wear a seat belt in the car. Follow your doctor's advice about when to have certain tests.  These tests can spot problems early. For men and women · Cholesterol. Your doctor will tell you how often to have this done based on your overall health and other things that can increase your risk for heart attack and stroke. · Blood pressure. Have your blood pressure checked during a routine doctor visit. Your doctor will tell you how often to check your blood pressure based on your age, your blood pressure results, and other factors. · Diabetes. Ask your doctor whether you should have tests for diabetes. · Vision. Experts recommend that you have yearly exams for glaucoma and other age-related eye problems. · Hearing. Tell your doctor if you notice any change in your hearing. You can have tests to find out how well you hear. · Colon cancer tests. Keep having colon cancer tests as your doctor recommends. You can have one of several types of tests. · Heart attack and stroke risk. At least every 4 to 6 years, you should have your risk for heart attack and stroke assessed. Your doctor uses factors such as your age, blood pressure, cholesterol, and whether you smoke or have diabetes to show what your risk for a heart attack or stroke is over the next 10 years. · Osteoporosis. Talk to your doctor about whether you should have a bone density test to find out whether you have thinning bones. Ask your doctor if you need to take a calcium plus vitamin D supplement. You may be able to get enough calcium and vitamin D through your diet. For women · Pap test and pelvic exam. You may no longer need a Pap test. Talk with your doctor about whether to stop or continue to have Pap tests. · Breast exam and mammogram. Ask how often you should have a mammogram, which is an X-ray of your breasts. A mammogram can spot breast cancer before it can be felt and when it is easiest to treat. · Thyroid disease.  Talk to your doctor about whether to have your thyroid checked as part of a regular physical exam. Women have an increased chance of a thyroid problem. For men · Prostate exam. Talk to your doctor about whether you should have a blood test (called a PSA test) for prostate cancer. Experts recommend that you discuss the benefits and risks of the test with your doctor before you decide whether to have this test. Some experts say that men ages 79 and older no longer need testing. · Abdominal aortic aneurysm. Ask your doctor whether you should have a test to check for an aneurysm. You may need a test if you ever smoked or if your parent, brother, sister, or child has had an aneurysm. When should you call for help? Watch closely for changes in your health, and be sure to contact your doctor if you have any problems or symptoms that concern you. Where can you learn more? Go to http://www.gray.com/ Enter X367 in the search box to learn more about \"Well Visit, Over 65: Care Instructions. \" Current as of: May 27, 2020               Content Version: 12.6 © 7830-9139 [employer]. Care instructions adapted under license by [employer] (which disclaims liability or warranty for this information). If you have questions about a medical condition or this instruction, always ask your healthcare professional. [place] [address] any warranty or liability for your use of this information. Medicare Wellness Visit, Female The best way to improve and maintain good health is to have a healthy lifestyle by eating a well-balanced diet, exercising regularly, limiting alcohol and stopping smoking. Regular visits with your physician or non-physician health care provider also support your good health. Preventive screening tests can find health problems before they become diseases or illnesses. Here is a list of your current Health Maintenance items with a due date: 
Health Maintenance Topic Date Due  
 Hepatitis C Screening  Never done  Foot Exam Q1  Never done  Eye Exam Retinal or Dilated  Never done  DTaP/Tdap/Td series (1 - Tdap) Never done  Shingrix Vaccine Age 50> (1 of 2) Never done  Bone Densitometry (Dexa) Screening  Never done  Pneumococcal 65+ years (2 of 2 - PPSV23) 10/24/2019  Medicare Yearly Exam  Never done  COVID-19 Vaccine (2 - Moderna 2-dose series) 04/02/2021  A1C test (Diabetic or Prediabetic)  06/05/2021  MICROALBUMIN Q1  08/10/2021  Lipid Screen  08/10/2021  Breast Cancer Screen Mammogram  08/27/2022  Colorectal Cancer Screening Combo  01/01/2023  Flu Vaccine  Completed Preventive services such as immunizations prevent serious infections. All people over age 72 should have a Pneumovax and a Prevnar-13 shot to prevent potentially life threatening infections with the pneumococcus bacteria, a common cause of pneumonia. These are once in a lifetime unless you and your provider decide differently. All people over 65 should have a yearly influenza vaccine or \"flu\" shot. This does not prevent infection with cold viruses but has been proven to prevent hospitalization and death from influenza. Although Medicare part B \"regular Medicare\" currently only covers tetanus vaccination in the context of an injury, a tetanus vaccine (Tdap or Td) is recommended every 10 years. A new 2 shot shingles vaccine series (Shingrix) is recommended after age 48 even for people who have already received Zostavax (the old vaccine). It is also not covered by Medicare part B. Note, however, that both the Shingles vaccine and Tdap/Td are generally covered by secondary carriers. Please check your coverage and out of pocket expenses. Consider contacting your local health department because it may stock these vaccines for a reasonable charge. We currently have documentation of the following immunization history for you: 
Immunization History Administered Date(s) Administered  Covid-19, MODERNA, Mrna, Lnp-s, Pf, 100mcg/0.5mL 03/05/2021  Influenza Vaccine 10/01/2016, 10/24/2018, 10/10/2019  Influenza Vaccine (Quad) Mdck Pf (>4 Yrs Flucelvax QUAD [id number]) 11/27/2020  Pneumococcal Conjugate (PCV-13) 10/24/2018 Screening for infection with Hepatitis C is recommended for anyone born between [birth year] through [address]. The table at the top of this document indicates the status of this and other preventive services. A bone mass density test (DEXA) to screen for osteoporosis or thinning of the bones should be done at least once after age 72 and may be done up to every 2 years as determined by you and your health care provider. The most recent DEXA we have on file for you is: DEXA Results (most recent): No results found for this or any previous visit. Screening for diabetes mellitus with a blood sugar test (glucose) should be done at least every 3 years until age 79. You and your health care provider may decide whether to continue screening after age 79. The most recent blood glucose we have on file for you is:  
Lab Results Component Value Date/Time Glucose 363 (H) 11/25/2020 08:32 PM  
 Glucose (POC) 448 (H) 11/27/2020 02:53 PM  
 
 
Fasting sugars >126 on 2 separate occassions are consistent with diabetes. Random sugars >200 on 2 separate occassions are consistent with diabetes Glaucoma is a disease of the eye due to increased ocular pressure that can lead to blindness. People with risk factors for glaucoma ( race, diabetes, family history) should consider screening at least every 2 years by an eye professional.  
 
Cardiovascular screening tests that check for elevated lipids or cholesterol (fatty part of blood) which can lead to heart disease and strokes should be done every 4-6 years through age 79. You and your health care provider may decide whether to continue screening after age 79. The most recent lipid panel we have on file for you is:  
Lab Results Component Value Date/Time  Cholesterol, total 179 08/10/2020 11:00 AM  
 HDL Cholesterol 54 08/10/2020 11:00 AM  
 LDL,Direct 90 11/23/2020 08:35 PM  
 LDL, calculated 101 (H) 08/10/2020 11:00 AM  
 VLDL, calculated 24 08/10/2020 11:00 AM  
 Triglyceride 121 08/10/2020 11:00 AM  
 
 
Colorectal cancer screening that evaluates for blood or polyps in your colon for people with average risk should be done yearly as a stool test, every five years as a flexible sigmoidoscope or every 10 years as a colonoscopy up to age 76. You and your health care provider may decide whether to continue screening after age 76. Breast cancer screening with a mammogram is recommended at least once every 2 years  for women age 54-69. You and your health care provider may decide whether to continue screening after age 76. The most recent mammogram we have on file for you is: [facility] Results (most recent): 
Results from Orders Only encounter on 09/14/20 [facility] MAMMO BI SCREENING INCL CAD Screening for cervical cancer with a pap smear is recommended for all women with a cervix until age 72. The frequency of this test is based on the details of her prior pap smear testing. You and your health care provider may decide whether to continue screening after age 72. Your Medicare Wellness Exam is recommended annually.

## 2021-03-23 NOTE — PROGRESS NOTES
Chief Complaint   Patient presents with    Hypertension    Diabetes    Cholesterol Problem   [address] Annual Wellness Visit     Medicare Wellness (Subsequent). 1. Have you been to the ER, urgent care clinic since your last visit? Hospitalized since your last visit? No    2. Have you seen or consulted any other health care providers outside of the [address] [place] [address] since your last visit? Include any pap smears or colon screening. No      Patient needs order for mammogram.  Last one done 03/29/2020. Last colonoscopy done 02/08/2019. Needs order for Bone Density. States had eye exam done approximately 2 months ago per Fred Freitas in [place]. [address] [id number], Fax [phone]. Sees Dustin Godoy MD  [facility]. RW:[phone], Fax:[phone]. This is the Subsequent Medicare Annual Wellness Exam, performed 12 months or more after the Initial AWV or the last Subsequent AWV    I have reviewed the patient's medical history in detail and updated the computerized patient record. Depression Risk Factor Screening:     3 most recent PHQ Screens 3/23/2021   Little interest or pleasure in doing things Not at all   Feeling down, depressed, irritable, or hopeless Not at all   Total Score PHQ 2 0       Alcohol Risk Screen    Do you average more than 1 drink per night or more than 7 drinks a week:  No    On any one occasion in the past three months have you have had more than 3 drinks containing alcohol:  No        Functional Ability and Level of Safety:    Hearing: Hearing is good. Activities of Daily Living: The home contains: handrails and grab bars  Patient does total self care      Ambulation: with no difficulty     Fall Risk:  Fall Risk Assessment, last 12 mths 3/23/2021   Able to walk? Yes   Fall in past 12 months? 0   Do you feel unsteady?  0   Are you worried about falling 0      Abuse Screen:  Patient is not abused       Cognitive Screening    Has your family/caregiver stated any concerns about your memory: no     Cognitive Screening: Normal - Mini Cog Test    Assessment/Plan   Education and counseling provided:  Are appropriate based on today's review and evaluation  End-of-Life planning (with patient's consent)    Diagnoses and all orders for this visit:    1. Encounter for annual wellness visit (AWV) in Medicare patient    2. Essential (primary) hypertension  -     HEPATITIS C AB  -     CBC WITH AUTOMATED DIFF  -     METABOLIC PANEL, COMPREHENSIVE  -     URINALYSIS W/ RFLX MICROSCOPIC    3. Mixed hyperlipidemia due to type 2 diabetes mellitus (HCC)  -     LIPID PANEL    4. Type 2 diabetes mellitus without complication, without long-term current use of insulin (HCC)  -     MICROALBUMIN, UR, RAND W/ MICROALB/CREAT RATIO  -     HEMOGLOBIN A1C WITH EAG  -     SITagliptin (JANUVIA) 50 mg tablet; Take 1 Tab by mouth daily. 5. Mild intermittent asthma, uncomplicated    6. Chronic gout without tophus, unspecified cause, unspecified site    7. Severe obesity ([facility] [address]    8. Encounter for hepatitis C screening test for low risk patient  -     HEPATITIS C AB    9. Asymptomatic menopause  -     DEXA BONE DENSITY STUDY AXIAL; Future    10. Encounter for screening mammogram for malignant neoplasm of breast  -     TADEO MAMMO BI SCREENING INCL CAD;  Future        Health Maintenance Due     Health Maintenance Due   Topic Date Due    Hepatitis C Screening  Never done    Foot Exam Q1  Never done    Eye Exam Retinal or Dilated  Never done    DTaP/Tdap/Td series (1 - Tdap) Never done    Shingrix Vaccine Age 50> (1 of 2) Never done    Bone Densitometry (Dexa) Screening  Never done    Pneumococcal 65+ years (2 of 2 - PPSV23) 10/24/2019       Patient Care Team   Patient Care Team:  Duc Delvalle MD as PCP - General (Family Medicine)  Duc Delvalle MD as PCP - [facility] Empaneled Provider  Fahad Lamb MD as Consulting Provider (Pulmonary Disease)  Agus Segundo OD as Consulting Provider (Optometry)    History     Patient Active Problem List   Diagnosis Code    Mild intermittent asthma, uncomplicated U80.39    Essential (primary) hypertension I10    Glaucoma H40.9    Gout M10.9    Mixed hyperlipidemia due to type 2 diabetes mellitus ([address] E11.69, E78.2    Type 2 diabetes mellitus without complications ([address] B34.3    Severe obesity ([address] E66.01    History of total knee replacement Z96.659    Osteoarthritis involving multiple joints on both sides of body M15.9    Asthma J45.909    Hypercholesterolemia E78.00     Past Medical History:   Diagnosis Date    Acute sinusitis 11/13/2020    Arthritis     COVID-19 11/22/2020    Cyst of ovary 11/13/2020    Essential (primary) hypertension 9/26/2017    Glaucoma 11/30/2018    Gout 8/9/2019    Hypercholesterolemia 11/13/2020    Impacted cerumen 11/13/2020    Mild intermittent asthma, uncomplicated 9/19/6472    Mixed hyperlipidemia due to type 2 diabetes mellitus ([address] 9/26/2017    Painless rectal bleeding 11/30/2018    Type 2 diabetes mellitus without complications ([address] 3/94/0374      Past Surgical History:   Procedure Laterality Date    HX COLONOSCOPY      HX HERNIA REPAIR  Unknown date    HX KNEE ARTHROSCOPY Right 01/01/2013    HX OTHER SURGICAL  05/16/2017    Eye surgery procedure    HX PARTIAL HYSTERECTOMY  Unknown date     Current Outpatient Medications   Medication Sig Dispense Refill    SITagliptin (JANUVIA) 50 mg tablet Take 1 Tab by mouth daily. 90 Tab 1    metFORMIN (GLUCOPHAGE) 1,000 mg tablet Take 1 Tab by mouth two (2) times daily (with meals). 30 Tab 0    Trelegy Ellipta 100-62.5-25 mcg inhaler 1 Puff daily.  brimonidine (ALPHAGAN) 0.2 % ophthalmic solution Administer 1 Drop to left eye three (3) times daily.  dorzolamide (TRUSOPT) 2 % ophthalmic solution Administer 1 Drop to left eye three (3) times daily.       latanoprost (XALATAN) 0.005 % ophthalmic solution Administer 2 Drops to both eyes three (3) times daily.  cloNIDine HCL (CATAPRES) 0.2 mg tablet Take 0.1 mg by mouth daily.  albuterol (PROVENTIL VENTOLIN) 2.5 mg /3 mL (0.083 %) nebu USE 1 VIAL IN NEBULIZER 4 TIMES DAILY AS NEEDED 300 mL 0    glipiZIDE (GLUCOTROL) 5 mg tablet TAKE 2 TABLETS TWICE DAILY 360 Tab 0    atenoloL (TENORMIN) 50 mg tablet TAKE 1 TABLET EVERY DAY FOR HIGH BLOOD PRESSURE 90 Tab 0    lisinopriL (PRINIVIL, ZESTRIL) 20 mg tablet TAKE 1 TABLET EVERY DAY 90 Tab 0    hydroCHLOROthiazide (HYDRODIURIL) 25 mg tablet TAKE 1 TABLET EVERY DAY 90 Tab 0    pravastatin (PRAVACHOL) 40 mg tablet TAKE 1 TABLET EVERY DAY 90 Tab 0    BD Single Use Swabs Regular padm USE  TO TEST BLOOD SUGAR EVERY  Pad 1    allopurinoL (ZYLOPRIM) 100 mg tablet TAKE 1 TABLET EVERY DAY 90 Tab 0    amLODIPine (NORVASC) 10 mg tablet TAKE 1 TABLET EVERY DAY 90 Tab 0    True Metrix Glucose Test Strip strip USE TO CHECK BLOOD SUGAR DAILY 100 Strip 1    TRUEplus Lancets 28 gauge misc USE TO CHECK BLOOD SUGAR DAILY       Allergies   Allergen Reactions    Aspirin Shortness of Breath     Respiratory distress    Codeine Vertigo       Family History   Problem Relation Age of Onset    Arthritis-osteo Mother     Hypertension Other     Heart Disease Other     Diabetes Other      Social History     Tobacco Use    Smoking status: Former Smoker     Types: Cigarettes    Smokeless tobacco: Never Used    Tobacco comment: At least 30 years ago. Substance Use Topics    Alcohol use: Never     Frequency: Never         Og Chano (: 1950) is a 66 y.o. female, established patient, here for evaluation of the following chief complaint(s):  Hypertension, Diabetes, Cholesterol Problem, Labs, and Annual Wellness Visit [facility]. - [facility] Wellness (Subsequent). )       ASSESSMENT/PLAN:  1. Encounter for annual wellness visit (AWV) in Medicare patient  2.  Essential (primary) hypertension  -     HEPATITIS C AB  -     CBC WITH AUTOMATED DIFF  -     METABOLIC PANEL, COMPREHENSIVE  -     URINALYSIS W/ RFLX MICROSCOPIC  3. Mixed hyperlipidemia due to type 2 diabetes mellitus (HCC)  -     LIPID PANEL  4. Type 2 diabetes mellitus without complication, without long-term current use of insulin (HCC)  -     MICROALBUMIN, UR, RAND W/ MICROALB/CREAT RATIO  -     HEMOGLOBIN A1C WITH EAG  -     SITagliptin (JANUVIA) 50 mg tablet; Take 1 Tab by mouth daily. , Normal, Disp-90 Tab, R-1  5. Mild intermittent asthma, uncomplicated  6. Chronic gout without tophus, unspecified cause, unspecified site  7. Severe obesity ([facility] [address]  8. Encounter for hepatitis C screening test for low risk patient  -     HEPATITIS C AB  9. Asymptomatic menopause  -     DEXA BONE DENSITY STUDY AXIAL; Future  10. Encounter for screening mammogram for malignant neoplasm of breast  -     TADEO MAMMO BI SCREENING INCL CAD; Future      Return in about 3 months (around 6/23/2021) for Follow up of chronic medical conditions, Fasting Lab Appointment. SUBJECTIVE/OBJECTIVE:  This patient is here for an annual Medicare Wellness Exam. She needs follow up for her DM, Hyperlipidemia, Asthma, Hypertension and obesity. Doing well and Sees Dr. Manav Jeffries for her Asthma      Review of Systems   Constitutional: Negative. Respiratory: Negative. Cardiovascular: Negative. Gastrointestinal: Negative. Endocrine: Negative. Checks her sugars daily They are controlled  She has night sweats almost every night. Genitourinary: Negative. Musculoskeletal: Positive for arthralgias and myalgias. Allergic/Immunologic: Negative. Neurological: Negative. Hematological: Negative. Psychiatric/Behavioral: Negative. Physical Exam  Vitals signs and nursing note reviewed. Constitutional:       Appearance: Normal appearance. She is morbidly obese. HENT:      Head: Normocephalic and atraumatic.    Cardiovascular:      Rate and Rhythm: Normal rate and regular rhythm. Heart sounds: Normal heart sounds. Pulmonary:      Effort: Pulmonary effort is normal.      Breath sounds: Normal breath sounds. Abdominal:      General: Abdomen is flat. Bowel sounds are normal.      Palpations: Abdomen is soft. Musculoskeletal: Normal range of motion. Neurological:      General: No focal deficit present. Mental Status: She is alert. Psychiatric:         Mood and Affect: Mood normal.         Behavior: Behavior normal. Behavior is cooperative. Thought Content: Thought content normal.         Judgment: Judgment normal.         Discussed her night sweats which she has had for several years. I told her to discontinue the warming blanket she uses at night to see if this helps and if not we will look into this further after we get lab work read back      An electronic signature was used to authenticate this note.   -- Christiana Zambrano MD

## 2021-03-24 LAB
ALBUMIN SERPL-MCNC: 4.4 G/DL (ref 3.7–4.7)
ALBUMIN/CREAT UR: 148 MG/G CREAT (ref 0–29)
ALBUMIN/GLOB SERPL: 1.4 {RATIO} (ref 1.2–2.2)
ALP SERPL-CCNC: 71 IU/L (ref 39–117)
ALT SERPL-CCNC: 17 IU/L (ref 0–32)
APPEARANCE UR: CLEAR
AST SERPL-CCNC: 16 IU/L (ref 0–40)
BACTERIA #/AREA URNS HPF: ABNORMAL /[HPF]
BASOPHILS # BLD AUTO: 0 X10E3/UL (ref 0–0.2)
BASOPHILS NFR BLD AUTO: 0 %
BILIRUB SERPL-MCNC: <0.2 MG/DL (ref 0–1.2)
BILIRUB UR QL STRIP: NEGATIVE
BUN SERPL-MCNC: 9 MG/DL (ref 8–27)
BUN/CREAT SERPL: 13 (ref 12–28)
CALCIUM SERPL-MCNC: 10.2 MG/DL (ref 8.7–10.3)
CASTS URNS QL MICRO: ABNORMAL /LPF
CHLORIDE SERPL-SCNC: 98 MMOL/L (ref 96–106)
CHOLEST SERPL-MCNC: 166 MG/DL (ref 100–199)
CO2 SERPL-SCNC: 28 MMOL/L (ref 20–29)
COLOR UR: ABNORMAL
CREAT SERPL-MCNC: 0.72 MG/DL (ref 0.57–1)
CREAT UR-MCNC: 146.8 MG/DL
CRYSTALS URNS MICRO: ABNORMAL
EOSINOPHIL # BLD AUTO: 0.3 X10E3/UL (ref 0–0.4)
EOSINOPHIL NFR BLD AUTO: 4 %
EPI CELLS #/AREA URNS HPF: >10 /HPF (ref 0–10)
ERYTHROCYTE [DISTWIDTH] IN BLOOD BY AUTOMATED COUNT: 12.5 % (ref 11.7–15.4)
EST. AVERAGE GLUCOSE BLD GHB EST-MCNC: 243 MG/DL
GLOBULIN SER CALC-MCNC: 3.1 G/DL (ref 1.5–4.5)
GLUCOSE SERPL-MCNC: 164 MG/DL (ref 65–99)
GLUCOSE UR QL: NEGATIVE
HBA1C MFR BLD: 10.1 % (ref 4.8–5.6)
HCT VFR BLD AUTO: 37.5 % (ref 34–46.6)
HCV AB S/CO SERPL IA: <0.1 S/CO RATIO (ref 0–0.9)
HDLC SERPL-MCNC: 53 MG/DL
HGB BLD-MCNC: 12.4 G/DL (ref 11.1–15.9)
HGB UR QL STRIP: ABNORMAL
IMM GRANULOCYTES # BLD AUTO: 0 X10E3/UL (ref 0–0.1)
IMM GRANULOCYTES NFR BLD AUTO: 0 %
KETONES UR QL STRIP: NEGATIVE
LDLC SERPL CALC-MCNC: 95 MG/DL (ref 0–99)
LEUKOCYTE ESTERASE UR QL STRIP: ABNORMAL
LYMPHOCYTES # BLD AUTO: 1.5 X10E3/UL (ref 0.7–3.1)
LYMPHOCYTES NFR BLD AUTO: 22 %
MCH RBC QN AUTO: 29.4 PG (ref 26.6–33)
MCHC RBC AUTO-ENTMCNC: 33.1 G/DL (ref 31.5–35.7)
MCV RBC AUTO: 89 FL (ref 79–97)
MICRO URNS: ABNORMAL
MICROALBUMIN UR-MCNC: 217.7 UG/ML
MONOCYTES # BLD AUTO: 0.7 X10E3/UL (ref 0.1–0.9)
MONOCYTES NFR BLD AUTO: 10 %
NEUTROPHILS # BLD AUTO: 4.3 X10E3/UL (ref 1.4–7)
NEUTROPHILS NFR BLD AUTO: 64 %
NITRITE UR QL STRIP: NEGATIVE
PH UR STRIP: 5.5 [PH] (ref 5–7.5)
PLATELET # BLD AUTO: 303 X10E3/UL (ref 150–450)
POTASSIUM SERPL-SCNC: 4.1 MMOL/L (ref 3.5–5.2)
PROT SERPL-MCNC: 7.5 G/DL (ref 6–8.5)
PROT UR QL STRIP: ABNORMAL
RBC # BLD AUTO: 4.22 X10E6/UL (ref 3.77–5.28)
RBC #/AREA URNS HPF: ABNORMAL /HPF (ref 0–2)
SODIUM SERPL-SCNC: 142 MMOL/L (ref 134–144)
SP GR UR: 1.02 (ref 1–1.03)
TRIGL SERPL-MCNC: 97 MG/DL (ref 0–149)
UNIDENT CRYS URNS QL MICRO: PRESENT
UROBILINOGEN UR STRIP-MCNC: 0.2 MG/DL (ref 0.2–1)
VLDLC SERPL CALC-MCNC: 18 MG/DL (ref 5–40)
WBC # BLD AUTO: 6.8 X10E3/UL (ref 3.4–10.8)
WBC #/AREA URNS HPF: ABNORMAL /HPF (ref 0–5)

## 2021-04-13 DIAGNOSIS — Z78.0 ASYMPTOMATIC MENOPAUSE: ICD-10-CM

## 2021-04-13 NOTE — PROGRESS NOTES
Informed patient of lab results and recommendations per Dr. Dawson Kincaid. . Patient stated understanding of increasing the Januvia to 100mg every day and coming by office to do another urine test and urine culture. She will schedule a 3 month fasting OV then.

## 2021-04-22 DIAGNOSIS — E11.9 TYPE 2 DIABETES MELLITUS WITHOUT COMPLICATION, WITHOUT LONG-TERM CURRENT USE OF INSULIN (HCC): ICD-10-CM

## 2021-04-22 NOTE — TELEPHONE ENCOUNTER
Requested Prescriptions     Pending Prescriptions Disp Refills    metFORMIN (GLUCOPHAGE) 1,000 mg tablet 30 Tab 0     Sig: Take 1 Tab by mouth two (2) times daily (with meals).  SITagliptin (JANUVIA) 50 mg tablet 90 Tab 1     Sig: Take 1 Tab by mouth daily. Patient states that Ashwini Gamino increased her dosage on each of these and she needs new RX sent to [facility].

## 2021-04-23 NOTE — TELEPHONE ENCOUNTER
Patient was instructed to increase the Januvia to 100mg every day and she is already on the max dose of Metformin. She requests that a 14 day of both the [place] and Metformin be sent to [facility] and a 90 day supply of Januvia and Metformin be sent to [facility] also.

## 2021-04-24 DIAGNOSIS — E11.9 TYPE 2 DIABETES MELLITUS WITHOUT COMPLICATION, WITHOUT LONG-TERM CURRENT USE OF INSULIN (HCC): Primary | ICD-10-CM

## 2021-04-24 RX ORDER — METFORMIN HYDROCHLORIDE 1000 MG/1
1000 TABLET ORAL 2 TIMES DAILY WITH MEALS
Qty: 30 TAB | Refills: 0 | Status: SHIPPED | OUTPATIENT
Start: 2021-04-24 | End: 2021-04-24 | Stop reason: SDUPTHER

## 2021-04-24 RX ORDER — METFORMIN HYDROCHLORIDE 1000 MG/1
1000 TABLET ORAL 2 TIMES DAILY WITH MEALS
Qty: 180 TAB | Refills: 1 | Status: SHIPPED | OUTPATIENT
Start: 2021-04-24 | End: 2021-12-08 | Stop reason: SDUPTHER

## 2021-04-26 DIAGNOSIS — I10 ESSENTIAL (PRIMARY) HYPERTENSION: ICD-10-CM

## 2021-04-26 RX ORDER — ATENOLOL 50 MG/1
TABLET ORAL
Qty: 90 TAB | Refills: 0 | Status: SHIPPED | OUTPATIENT
Start: 2021-04-26 | End: 2021-07-04

## 2021-04-26 RX ORDER — HYDROCHLOROTHIAZIDE 25 MG/1
TABLET ORAL
Qty: 90 TAB | Refills: 0 | Status: SHIPPED | OUTPATIENT
Start: 2021-04-26 | End: 2021-07-04

## 2021-04-26 RX ORDER — GLIPIZIDE 5 MG/1
TABLET ORAL
Qty: 360 TAB | Refills: 0 | Status: SHIPPED | OUTPATIENT
Start: 2021-04-26 | End: 2021-07-04

## 2021-04-26 RX ORDER — CLONIDINE HYDROCHLORIDE 0.2 MG/1
TABLET ORAL
Qty: 180 TAB | Refills: 1 | Status: SHIPPED | OUTPATIENT
Start: 2021-04-26 | End: 2021-09-15

## 2021-04-26 RX ORDER — LISINOPRIL 20 MG/1
TABLET ORAL
Qty: 90 TAB | Refills: 0 | Status: SHIPPED | OUTPATIENT
Start: 2021-04-26 | End: 2021-07-04

## 2021-04-26 RX ORDER — PRAVASTATIN SODIUM 40 MG/1
TABLET ORAL
Qty: 90 TAB | Refills: 0 | Status: SHIPPED | OUTPATIENT
Start: 2021-04-26 | End: 2021-07-04

## 2021-05-04 RX ORDER — ALLOPURINOL 100 MG/1
TABLET ORAL
Qty: 90 TAB | Refills: 0 | Status: SHIPPED | OUTPATIENT
Start: 2021-05-04 | End: 2021-07-02

## 2021-05-11 DIAGNOSIS — J45.20 MILD INTERMITTENT ASTHMA, UNCOMPLICATED: Primary | ICD-10-CM

## 2021-05-11 RX ORDER — ALBUTEROL SULFATE 0.83 MG/ML
SOLUTION RESPIRATORY (INHALATION)
Qty: 300 ML | Refills: 1 | Status: SHIPPED | OUTPATIENT
Start: 2021-05-11 | End: 2021-11-28

## 2021-05-24 DIAGNOSIS — E11.9 TYPE 2 DIABETES MELLITUS WITHOUT COMPLICATION, WITHOUT LONG-TERM CURRENT USE OF INSULIN (HCC): ICD-10-CM

## 2021-05-24 RX ORDER — CALCIUM CITRATE/VITAMIN D3 200MG-6.25
TABLET ORAL
Qty: 100 STRIP | Refills: 1 | Status: SHIPPED | OUTPATIENT
Start: 2021-05-24 | End: 2021-11-19

## 2021-05-25 ENCOUNTER — TELEPHONE (OUTPATIENT)
Dept: PRIMARY CARE CLINIC | Age: 71
End: 2021-05-25

## 2021-05-25 NOTE — TELEPHONE ENCOUNTER
Re:  Christopher Cano    Patient requesting something different to take as Christopher Cano is too expensive.

## 2021-05-26 DIAGNOSIS — E11.9 TYPE 2 DIABETES MELLITUS WITHOUT COMPLICATION, WITHOUT LONG-TERM CURRENT USE OF INSULIN (HCC): Primary | ICD-10-CM

## 2021-05-26 RX ORDER — PIOGLITAZONEHYDROCHLORIDE 30 MG/1
30 TABLET ORAL
Qty: 90 TABLET | Refills: 1 | Status: SHIPPED | OUTPATIENT
Start: 2021-05-26 | End: 2021-08-31 | Stop reason: SDUPTHER

## 2021-05-26 NOTE — TELEPHONE ENCOUNTER
Patient reports she does not want to see another doctor. She wants to try another medication. Thank you, please advise.

## 2021-05-26 NOTE — TELEPHONE ENCOUNTER
Discharge instructions given, patient verbalized their understanding, no other noted or stated problems at this time. Patient will follow up with primary doctor for care.      Madonna Kidd RN  05/09/19 [id number] Patient notified

## 2021-05-26 NOTE — TELEPHONE ENCOUNTER
Recommend referral to Dr. Rosetta East as most of the new medications are expensive. I do not see that she has a history of heart failure or swollen legs. We can try an older medication that will be more affordable but cannot be used if she has a history of heart failure or significant swelling of her legs. If she agrees to see a Diabetic Specialist, I will put an order in.

## 2021-06-01 ENCOUNTER — TELEPHONE (OUTPATIENT)
Dept: PRIMARY CARE CLINIC | Age: 71
End: 2021-06-01

## 2021-06-01 DIAGNOSIS — R82.998 OTHER ABNORMAL FINDINGS IN URINE: ICD-10-CM

## 2021-06-01 DIAGNOSIS — E11.9 TYPE 2 DIABETES MELLITUS WITHOUT COMPLICATION, WITHOUT LONG-TERM CURRENT USE OF INSULIN (HCC): ICD-10-CM

## 2021-06-01 DIAGNOSIS — I10 ESSENTIAL (PRIMARY) HYPERTENSION: Primary | ICD-10-CM

## 2021-06-03 LAB
APPEARANCE UR: CLEAR
BACTERIA #/AREA URNS HPF: NORMAL /[HPF]
BACTERIA UR CULT: NORMAL
BILIRUB UR QL STRIP: NEGATIVE
CASTS URNS QL MICRO: NORMAL /LPF
COLOR UR: YELLOW
EPI CELLS #/AREA URNS HPF: NORMAL /HPF (ref 0–10)
GLUCOSE UR QL: NEGATIVE
HGB UR QL STRIP: NEGATIVE
KETONES UR QL STRIP: NEGATIVE
LEUKOCYTE ESTERASE UR QL STRIP: NEGATIVE
MICRO URNS: ABNORMAL
NITRITE UR QL STRIP: NEGATIVE
PH UR STRIP: 6.5 [PH] (ref 5–7.5)
PROT UR QL STRIP: ABNORMAL
RBC #/AREA URNS HPF: NORMAL /HPF (ref 0–2)
SP GR UR: 1.01 (ref 1–1.03)
UROBILINOGEN UR STRIP-MCNC: 0.2 MG/DL (ref 0.2–1)
WBC #/AREA URNS HPF: NORMAL /HPF (ref 0–5)

## 2021-07-02 DIAGNOSIS — I10 ESSENTIAL (PRIMARY) HYPERTENSION: ICD-10-CM

## 2021-07-04 RX ORDER — GLIPIZIDE 5 MG/1
TABLET ORAL
Qty: 360 TABLET | Refills: 0 | Status: SHIPPED | OUTPATIENT
Start: 2021-07-04 | End: 2021-09-15

## 2021-07-04 RX ORDER — HYDROCHLOROTHIAZIDE 25 MG/1
TABLET ORAL
Qty: 90 TABLET | Refills: 0 | Status: SHIPPED | OUTPATIENT
Start: 2021-07-04 | End: 2021-09-15

## 2021-07-04 RX ORDER — LISINOPRIL 20 MG/1
TABLET ORAL
Qty: 90 TABLET | Refills: 0 | Status: SHIPPED | OUTPATIENT
Start: 2021-07-04 | End: 2021-09-15

## 2021-07-04 RX ORDER — ATENOLOL 50 MG/1
TABLET ORAL
Qty: 90 TABLET | Refills: 0 | Status: SHIPPED | OUTPATIENT
Start: 2021-07-04 | End: 2021-09-15

## 2021-07-04 RX ORDER — PRAVASTATIN SODIUM 40 MG/1
TABLET ORAL
Qty: 90 TABLET | Refills: 0 | Status: SHIPPED | OUTPATIENT
Start: 2021-07-04 | End: 2021-09-15

## 2021-08-03 ENCOUNTER — OFFICE VISIT (OUTPATIENT)
Dept: PRIMARY CARE CLINIC | Age: 71
End: 2021-08-03
Payer: MEDICARE

## 2021-08-03 VITALS
WEIGHT: 238 LBS | TEMPERATURE: 98.2 F | SYSTOLIC BLOOD PRESSURE: 137 MMHG | DIASTOLIC BLOOD PRESSURE: 74 MMHG | OXYGEN SATURATION: 98 % | BODY MASS INDEX: 38.41 KG/M2 | HEART RATE: 76 BPM | RESPIRATION RATE: 20 BRPM

## 2021-08-03 DIAGNOSIS — M15.9 OSTEOARTHRITIS INVOLVING MULTIPLE JOINTS ON BOTH SIDES OF BODY: ICD-10-CM

## 2021-08-03 DIAGNOSIS — E11.69 MIXED HYPERLIPIDEMIA DUE TO TYPE 2 DIABETES MELLITUS (HCC): ICD-10-CM

## 2021-08-03 DIAGNOSIS — J45.20 MILD INTERMITTENT ASTHMA, UNCOMPLICATED: ICD-10-CM

## 2021-08-03 DIAGNOSIS — E66.01 SEVERE OBESITY (HCC): ICD-10-CM

## 2021-08-03 DIAGNOSIS — E11.9 TYPE 2 DIABETES MELLITUS WITHOUT COMPLICATION, WITHOUT LONG-TERM CURRENT USE OF INSULIN (HCC): Primary | ICD-10-CM

## 2021-08-03 DIAGNOSIS — I10 ESSENTIAL (PRIMARY) HYPERTENSION: ICD-10-CM

## 2021-08-03 DIAGNOSIS — E78.2 MIXED HYPERLIPIDEMIA DUE TO TYPE 2 DIABETES MELLITUS (HCC): ICD-10-CM

## 2021-08-03 LAB — HBA1C MFR BLD HPLC: 7.3 %

## 2021-08-03 PROCEDURE — G8536 NO DOC ELDER MAL SCRN: HCPCS | Performed by: FAMILY MEDICINE

## 2021-08-03 PROCEDURE — 99214 OFFICE O/P EST MOD 30 MIN: CPT | Performed by: FAMILY MEDICINE

## 2021-08-03 PROCEDURE — 83036 HEMOGLOBIN GLYCOSYLATED A1C: CPT | Performed by: FAMILY MEDICINE

## 2021-08-03 PROCEDURE — G8510 SCR DEP NEG, NO PLAN REQD: HCPCS | Performed by: FAMILY MEDICINE

## 2021-08-03 PROCEDURE — G9899 SCRN MAM PERF RSLTS DOC: HCPCS | Performed by: FAMILY MEDICINE

## 2021-08-03 PROCEDURE — G8399 PT W/DXA RESULTS DOCUMENT: HCPCS | Performed by: FAMILY MEDICINE

## 2021-08-03 PROCEDURE — 1090F PRES/ABSN URINE INCON ASSESS: CPT | Performed by: FAMILY MEDICINE

## 2021-08-03 PROCEDURE — G8417 CALC BMI ABV UP PARAM F/U: HCPCS | Performed by: FAMILY MEDICINE

## 2021-08-03 PROCEDURE — 3017F COLORECTAL CA SCREEN DOC REV: CPT | Performed by: FAMILY MEDICINE

## 2021-08-03 PROCEDURE — 3051F HG A1C>EQUAL 7.0%<8.0%: CPT | Performed by: FAMILY MEDICINE

## 2021-08-03 PROCEDURE — G8754 DIAS BP LESS 90: HCPCS | Performed by: FAMILY MEDICINE

## 2021-08-03 PROCEDURE — 1101F PT FALLS ASSESS-DOCD LE1/YR: CPT | Performed by: FAMILY MEDICINE

## 2021-08-03 PROCEDURE — G8427 DOCREV CUR MEDS BY ELIG CLIN: HCPCS | Performed by: FAMILY MEDICINE

## 2021-08-03 PROCEDURE — 2022F DILAT RTA XM EVC RTNOPTHY: CPT | Performed by: FAMILY MEDICINE

## 2021-08-03 PROCEDURE — G8752 SYS BP LESS 140: HCPCS | Performed by: FAMILY MEDICINE

## 2021-08-03 RX ORDER — TIMOLOL MALEATE 5 MG/ML
SOLUTION/ DROPS OPHTHALMIC
COMMUNITY
Start: 2021-07-14

## 2021-08-03 RX ORDER — FLUTICASONE PROPIONATE AND SALMETEROL 100; 50 UG/1; UG/1
1 POWDER RESPIRATORY (INHALATION) 2 TIMES DAILY
COMMUNITY
Start: 2021-07-31

## 2021-08-03 NOTE — PROGRESS NOTES
Inge Foley (: 1950) is a 66 y.o. female, established patient, here for evaluation of the following chief complaint(s):  Hypertension, Diabetes, and Labs       ASSESSMENT/PLAN:  Below is the assessment and plan developed based on review of pertinent history, physical exam, labs, studies, and medications. 1. Type 2 diabetes mellitus without complication, without long-term current use of insulin (HCC)  -     AMB POC HEMOGLOBIN A1C  -     MICROALBUMIN, UR, RAND W/ MICROALB/CREAT RATIO  2. Essential (primary) hypertension  -     CBC WITH AUTOMATED DIFF  -     METABOLIC PANEL, COMPREHENSIVE  -     URINALYSIS W/ RFLX MICROSCOPIC  3. Mixed hyperlipidemia due to type 2 diabetes mellitus (HCC)  -     LIPID PANEL  4. Mild intermittent asthma, uncomplicated  5. Osteoarthritis involving multiple joints on both sides of body  6. Severe obesity ([address]      Return in about 6 months (around 2/3/2022) for Follow up of chronic medical conditions, Fasting Lab Appointment. SUBJECTIVE/OBJECTIVE:  This patient comes in for follow-up of her type 2 diabetes mellitus, hypertension, morbid obesity, hyperlipidemia and osteoarthritis. Overall she is doing well but continues to have significant pain in her joints. We talked about her obesity and how this is contributing to her back pain and to her joints. We also talked about ways to work on weight loss and changing her diet. Review of Systems   Constitutional: Negative. Respiratory: Negative. Cardiovascular: Negative. Gastrointestinal: Negative. Endocrine: Negative. Genitourinary: Negative. Musculoskeletal: Positive for arthralgias (Shoulders) and myalgias. Allergic/Immunologic: Negative. Neurological: Negative. Hematological: Negative. Psychiatric/Behavioral: Negative. Physical Exam  Vitals and nursing note reviewed. Constitutional:       Appearance: Normal appearance. She is obese.    HENT:      Head: Normocephalic and atraumatic. Cardiovascular:      Rate and Rhythm: Normal rate and regular rhythm. Heart sounds: Normal heart sounds. Pulmonary:      Effort: Pulmonary effort is normal.      Breath sounds: Normal breath sounds. Abdominal:      General: Abdomen is flat. Bowel sounds are normal.      Palpations: Abdomen is soft. Neurological:      General: No focal deficit present. Mental Status: She is alert. Psychiatric:         Mood and Affect: Mood normal.         Behavior: Behavior normal.         Thought Content: Thought content normal.         Judgment: Judgment normal.       Overall this patient is stable and her last lab work was okay. We will order labs today to see how she is doing and she will continue the current medications for now. Discussed her weight and talked about ways she can make changes in her diet. An electronic signature was used to authenticate this note.   -- Stella Yun MD

## 2021-08-04 LAB
ALBUMIN SERPL-MCNC: 4.6 G/DL (ref 3.7–4.7)
ALBUMIN/CREAT UR: 175 MG/G CREAT (ref 0–29)
ALBUMIN/GLOB SERPL: 1.5 {RATIO} (ref 1.2–2.2)
ALP SERPL-CCNC: 68 IU/L (ref 48–121)
ALT SERPL-CCNC: 14 IU/L (ref 0–32)
APPEARANCE UR: CLEAR
AST SERPL-CCNC: 15 IU/L (ref 0–40)
BACTERIA #/AREA URNS HPF: NORMAL /[HPF]
BASOPHILS # BLD AUTO: 0 X10E3/UL (ref 0–0.2)
BASOPHILS NFR BLD AUTO: 0 %
BILIRUB SERPL-MCNC: 0.2 MG/DL (ref 0–1.2)
BILIRUB UR QL STRIP: NEGATIVE
BUN SERPL-MCNC: 12 MG/DL (ref 8–27)
BUN/CREAT SERPL: 16 (ref 12–28)
CALCIUM SERPL-MCNC: 10 MG/DL (ref 8.7–10.3)
CASTS URNS QL MICRO: NORMAL /LPF
CHLORIDE SERPL-SCNC: 98 MMOL/L (ref 96–106)
CHOLEST SERPL-MCNC: 209 MG/DL (ref 100–199)
CO2 SERPL-SCNC: 29 MMOL/L (ref 20–29)
COLOR UR: YELLOW
CREAT SERPL-MCNC: 0.77 MG/DL (ref 0.57–1)
CREAT UR-MCNC: 68.1 MG/DL
EOSINOPHIL # BLD AUTO: 0.1 X10E3/UL (ref 0–0.4)
EOSINOPHIL NFR BLD AUTO: 2 %
EPI CELLS #/AREA URNS HPF: NORMAL /HPF (ref 0–10)
ERYTHROCYTE [DISTWIDTH] IN BLOOD BY AUTOMATED COUNT: 13.8 % (ref 11.7–15.4)
GLOBULIN SER CALC-MCNC: 3 G/DL (ref 1.5–4.5)
GLUCOSE SERPL-MCNC: 107 MG/DL (ref 65–99)
GLUCOSE UR QL: NEGATIVE
HCT VFR BLD AUTO: 37 % (ref 34–46.6)
HDLC SERPL-MCNC: 61 MG/DL
HGB BLD-MCNC: 12.1 G/DL (ref 11.1–15.9)
HGB UR QL STRIP: NEGATIVE
IMM GRANULOCYTES # BLD AUTO: 0 X10E3/UL (ref 0–0.1)
IMM GRANULOCYTES NFR BLD AUTO: 0 %
KETONES UR QL STRIP: NEGATIVE
LDLC SERPL CALC-MCNC: 126 MG/DL (ref 0–99)
LEUKOCYTE ESTERASE UR QL STRIP: NEGATIVE
LYMPHOCYTES # BLD AUTO: 1.7 X10E3/UL (ref 0.7–3.1)
LYMPHOCYTES NFR BLD AUTO: 27 %
MCH RBC QN AUTO: 28.3 PG (ref 26.6–33)
MCHC RBC AUTO-ENTMCNC: 32.7 G/DL (ref 31.5–35.7)
MCV RBC AUTO: 86 FL (ref 79–97)
MICRO URNS: ABNORMAL
MICROALBUMIN UR-MCNC: 118.9 UG/ML
MONOCYTES # BLD AUTO: 0.5 X10E3/UL (ref 0.1–0.9)
MONOCYTES NFR BLD AUTO: 8 %
NEUTROPHILS # BLD AUTO: 4 X10E3/UL (ref 1.4–7)
NEUTROPHILS NFR BLD AUTO: 63 %
NITRITE UR QL STRIP: NEGATIVE
PH UR STRIP: 7.5 [PH] (ref 5–7.5)
PLATELET # BLD AUTO: 297 X10E3/UL (ref 150–450)
POTASSIUM SERPL-SCNC: 4.2 MMOL/L (ref 3.5–5.2)
PROT SERPL-MCNC: 7.6 G/DL (ref 6–8.5)
PROT UR QL STRIP: ABNORMAL
RBC # BLD AUTO: 4.28 X10E6/UL (ref 3.77–5.28)
RBC #/AREA URNS HPF: NORMAL /HPF (ref 0–2)
SODIUM SERPL-SCNC: 141 MMOL/L (ref 134–144)
SP GR UR: 1.02 (ref 1–1.03)
TRIGL SERPL-MCNC: 124 MG/DL (ref 0–149)
UROBILINOGEN UR STRIP-MCNC: 0.2 MG/DL (ref 0.2–1)
VLDLC SERPL CALC-MCNC: 22 MG/DL (ref 5–40)
WBC # BLD AUTO: 6.4 X10E3/UL (ref 3.4–10.8)
WBC #/AREA URNS HPF: NORMAL /HPF (ref 0–5)

## 2021-08-13 RX ORDER — AMLODIPINE BESYLATE 10 MG/1
TABLET ORAL
Qty: 90 TABLET | Refills: 1 | Status: SHIPPED | OUTPATIENT
Start: 2021-08-13 | End: 2022-04-12 | Stop reason: SDUPTHER

## 2021-08-26 ENCOUNTER — TELEPHONE (OUTPATIENT)
Dept: PRIMARY CARE CLINIC | Age: 71
End: 2021-08-26

## 2021-08-26 NOTE — TELEPHONE ENCOUNTER
Informed patient that a order for mammogram was sent to [facility] on 03/23/2021 and is still good thru the end of 09/2021. She will call them and set up an appointment.

## 2021-08-31 ENCOUNTER — TELEPHONE (OUTPATIENT)
Dept: PRIMARY CARE CLINIC | Age: 71
End: 2021-08-31

## 2021-08-31 DIAGNOSIS — E11.9 TYPE 2 DIABETES MELLITUS WITHOUT COMPLICATION, WITHOUT LONG-TERM CURRENT USE OF INSULIN (HCC): ICD-10-CM

## 2021-08-31 RX ORDER — PIOGLITAZONEHYDROCHLORIDE 30 MG/1
30 TABLET ORAL
Qty: 90 TABLET | Refills: 1 | Status: SHIPPED | OUTPATIENT
Start: 2021-08-31 | End: 2021-09-01 | Stop reason: SDUPTHER

## 2021-08-31 NOTE — TELEPHONE ENCOUNTER
Called [facility] and spoke with Sylvia re: patient was informed she did not have any refills on her Pioglitazone and she only has 5 tablets left. Patient is very upset. Dr. London Arredondo sent in a RX on 05/26/2021 for #90 tablets with #1 refill and patient should be good thru November. After checking, Sylvia stated someone did give the wrong info to patient, she does have 1 refill. She wants me to have patient call her and she will handle this.

## 2021-09-01 ENCOUNTER — TELEPHONE (OUTPATIENT)
Dept: PRIMARY CARE CLINIC | Age: 71
End: 2021-09-01

## 2021-09-01 DIAGNOSIS — E11.9 TYPE 2 DIABETES MELLITUS WITHOUT COMPLICATION, WITHOUT LONG-TERM CURRENT USE OF INSULIN (HCC): ICD-10-CM

## 2021-09-01 RX ORDER — PIOGLITAZONEHYDROCHLORIDE 30 MG/1
30 TABLET ORAL
Qty: 7 TABLET | Refills: 0 | Status: SHIPPED | OUTPATIENT
Start: 2021-09-01 | End: 2022-03-23 | Stop reason: ALTCHOICE

## 2021-09-01 RX ORDER — PIOGLITAZONEHYDROCHLORIDE 30 MG/1
30 TABLET ORAL
Qty: 90 TABLET | Refills: 1 | Status: SHIPPED | OUTPATIENT
Start: 2021-09-01 | End: 2021-09-01 | Stop reason: SDUPTHER

## 2021-09-01 NOTE — TELEPHONE ENCOUNTER
Patient called and has a question about her diabetes medication. She wants to know if Rocio Dinh can give her a week supply today as Dr. Shey Palencia sent hers to mail order. She is a patient of Dr. Shey Palencia but saw Rocio Dinh earlier this year. Patient would like a call back.

## 2021-09-08 ENCOUNTER — TRANSCRIBE ORDER (OUTPATIENT)
Dept: SCHEDULING | Age: 71
End: 2021-09-08

## 2021-09-08 DIAGNOSIS — Z12.31 VISIT FOR SCREENING MAMMOGRAM: Primary | ICD-10-CM

## 2021-09-29 ENCOUNTER — HOSPITAL ENCOUNTER (OUTPATIENT)
Dept: MAMMOGRAPHY | Age: 71
Discharge: HOME | End: 2021-09-29
Payer: MEDICARE

## 2021-09-29 DIAGNOSIS — Z12.31 VISIT FOR SCREENING MAMMOGRAM: ICD-10-CM

## 2021-09-29 PROCEDURE — 77063 BREAST TOMOSYNTHESIS BI: CPT

## 2021-10-04 DIAGNOSIS — R92.8 ABNORMAL MAMMOGRAM OF RIGHT BREAST: Primary | ICD-10-CM

## 2021-10-11 ENCOUNTER — TELEPHONE (OUTPATIENT)
Dept: PRIMARY CARE CLINIC | Age: 71
End: 2021-10-11

## 2021-10-12 NOTE — TELEPHONE ENCOUNTER
JONATHANM stating that Dr. Rekha No approved for patient to get the Booster shot for Covid Vaccine. For questions or concerns, patient is to call the office.

## 2021-10-18 ENCOUNTER — HOSPITAL ENCOUNTER (OUTPATIENT)
Dept: MAMMOGRAPHY | Age: 71
Discharge: HOME | End: 2021-10-18
Attending: FAMILY MEDICINE
Payer: MEDICARE

## 2021-10-18 DIAGNOSIS — R92.8 ABNORMAL MAMMOGRAM OF RIGHT BREAST: ICD-10-CM

## 2021-10-18 PROCEDURE — 76882 US LMTD JT/FCL EVL NVASC XTR: CPT

## 2021-10-18 PROCEDURE — 77065 DX MAMMO INCL CAD UNI: CPT

## 2021-10-26 RX ORDER — ALLOPURINOL 100 MG/1
TABLET ORAL
Qty: 90 TABLET | Refills: 1 | Status: SHIPPED | OUTPATIENT
Start: 2021-10-26 | End: 2022-05-26 | Stop reason: SDUPTHER

## 2021-11-12 NOTE — PROGRESS NOTES
Informed patient of mammogram and ultrasound results. Need for repeat mammogram in 1 year. Patient stated understanding.

## 2021-11-17 DIAGNOSIS — E11.9 TYPE 2 DIABETES MELLITUS WITHOUT COMPLICATION, WITHOUT LONG-TERM CURRENT USE OF INSULIN (HCC): ICD-10-CM

## 2021-11-19 RX ORDER — CALCIUM CITRATE/VITAMIN D3 200MG-6.25
TABLET ORAL
Qty: 100 STRIP | Refills: 1 | Status: SHIPPED | OUTPATIENT
Start: 2021-11-19 | End: 2022-04-22 | Stop reason: SDUPTHER

## 2021-12-03 DIAGNOSIS — E11.9 TYPE 2 DIABETES MELLITUS WITHOUT COMPLICATION, WITHOUT LONG-TERM CURRENT USE OF INSULIN (HCC): ICD-10-CM

## 2021-12-07 DIAGNOSIS — E11.9 TYPE 2 DIABETES MELLITUS WITHOUT COMPLICATION, WITHOUT LONG-TERM CURRENT USE OF INSULIN (HCC): ICD-10-CM

## 2021-12-07 NOTE — TELEPHONE ENCOUNTER
Requested Prescriptions     Pending Prescriptions Disp Refills    metFORMIN (GLUCOPHAGE) 1,000 mg tablet 180 Tablet 1     Sig: Take 1 Tablet by mouth two (2) times daily (with meals).

## 2021-12-08 ENCOUNTER — TELEPHONE (OUTPATIENT)
Dept: PRIMARY CARE CLINIC | Age: 71
End: 2021-12-08

## 2021-12-08 DIAGNOSIS — E11.9 TYPE 2 DIABETES MELLITUS WITHOUT COMPLICATION, WITHOUT LONG-TERM CURRENT USE OF INSULIN (HCC): ICD-10-CM

## 2021-12-08 RX ORDER — METFORMIN HYDROCHLORIDE 1000 MG/1
1000 TABLET ORAL 2 TIMES DAILY WITH MEALS
Qty: 20 TABLET | Refills: 1 | Status: SHIPPED | OUTPATIENT
Start: 2021-12-08 | End: 2021-12-11 | Stop reason: SDUPTHER

## 2021-12-08 NOTE — TELEPHONE ENCOUNTER
Pt called and is worried she is going to run out of her medication before it gets approved for refills-2 request have been put in for her metformin. She would like a call back in regards to this.

## 2021-12-11 RX ORDER — METFORMIN HYDROCHLORIDE 1000 MG/1
1000 TABLET ORAL 2 TIMES DAILY WITH MEALS
Qty: 180 TABLET | Refills: 1 | Status: SHIPPED | OUTPATIENT
Start: 2021-12-11 | End: 2022-04-05 | Stop reason: SDUPTHER

## 2021-12-11 RX ORDER — METFORMIN HYDROCHLORIDE 1000 MG/1
1000 TABLET ORAL 2 TIMES DAILY WITH MEALS
Qty: 180 TABLET | Refills: 1 | OUTPATIENT
Start: 2021-12-11

## 2021-12-31 DIAGNOSIS — J45.20 MILD INTERMITTENT ASTHMA, UNCOMPLICATED: ICD-10-CM

## 2021-12-31 RX ORDER — ALBUTEROL SULFATE 0.83 MG/ML
SOLUTION RESPIRATORY (INHALATION)
Qty: 270 ML | Refills: 1 | Status: SHIPPED | OUTPATIENT
Start: 2021-12-31 | End: 2022-06-22

## 2022-02-07 DIAGNOSIS — E11.69 MIXED HYPERLIPIDEMIA DUE TO TYPE 2 DIABETES MELLITUS (HCC): ICD-10-CM

## 2022-02-07 DIAGNOSIS — E11.9 TYPE 2 DIABETES MELLITUS WITHOUT COMPLICATION, WITHOUT LONG-TERM CURRENT USE OF INSULIN (HCC): Primary | ICD-10-CM

## 2022-02-07 DIAGNOSIS — I10 ESSENTIAL (PRIMARY) HYPERTENSION: ICD-10-CM

## 2022-02-07 DIAGNOSIS — E78.2 MIXED HYPERLIPIDEMIA DUE TO TYPE 2 DIABETES MELLITUS (HCC): ICD-10-CM

## 2022-02-07 RX ORDER — PRAVASTATIN SODIUM 40 MG/1
40 TABLET ORAL DAILY
Qty: 90 TABLET | Refills: 0 | Status: SHIPPED | OUTPATIENT
Start: 2022-02-07 | End: 2022-04-20

## 2022-02-07 RX ORDER — LISINOPRIL 20 MG/1
20 TABLET ORAL DAILY
Qty: 90 TABLET | Refills: 0 | Status: SHIPPED | OUTPATIENT
Start: 2022-02-07 | End: 2022-03-23 | Stop reason: SDUPTHER

## 2022-02-07 RX ORDER — GLIPIZIDE 5 MG/1
10 TABLET ORAL 2 TIMES DAILY
Qty: 360 TABLET | Refills: 0 | Status: SHIPPED | OUTPATIENT
Start: 2022-02-07 | End: 2022-04-20

## 2022-02-07 RX ORDER — CLONIDINE HYDROCHLORIDE 0.2 MG/1
0.2 TABLET ORAL 2 TIMES DAILY
Qty: 180 TABLET | Refills: 0 | Status: SHIPPED | OUTPATIENT
Start: 2022-02-07 | End: 2022-04-20

## 2022-02-07 RX ORDER — HYDROCHLOROTHIAZIDE 25 MG/1
25 TABLET ORAL DAILY
Qty: 90 TABLET | Refills: 0 | Status: SHIPPED | OUTPATIENT
Start: 2022-02-07 | End: 2022-04-20

## 2022-02-08 DIAGNOSIS — I10 ESSENTIAL (PRIMARY) HYPERTENSION: ICD-10-CM

## 2022-02-08 RX ORDER — ATENOLOL 50 MG/1
50 TABLET ORAL DAILY
Qty: 90 TABLET | Refills: 0 | Status: SHIPPED | OUTPATIENT
Start: 2022-02-08 | End: 2022-04-20

## 2022-02-21 ENCOUNTER — OFFICE VISIT (OUTPATIENT)
Dept: PRIMARY CARE CLINIC | Age: 72
End: 2022-02-21
Payer: MEDICARE

## 2022-02-21 VITALS
HEART RATE: 84 BPM | OXYGEN SATURATION: 97 % | HEIGHT: 66 IN | WEIGHT: 257.6 LBS | SYSTOLIC BLOOD PRESSURE: 139 MMHG | RESPIRATION RATE: 20 BRPM | DIASTOLIC BLOOD PRESSURE: 85 MMHG | TEMPERATURE: 97.8 F | BODY MASS INDEX: 41.4 KG/M2

## 2022-02-21 DIAGNOSIS — I10 ESSENTIAL (PRIMARY) HYPERTENSION: ICD-10-CM

## 2022-02-21 DIAGNOSIS — H40.9 GLAUCOMA OF BOTH EYES, UNSPECIFIED GLAUCOMA TYPE: ICD-10-CM

## 2022-02-21 DIAGNOSIS — M15.9 OSTEOARTHRITIS INVOLVING MULTIPLE JOINTS ON BOTH SIDES OF BODY: ICD-10-CM

## 2022-02-21 DIAGNOSIS — E11.9 TYPE 2 DIABETES MELLITUS WITHOUT COMPLICATION, WITHOUT LONG-TERM CURRENT USE OF INSULIN (HCC): Primary | ICD-10-CM

## 2022-02-21 DIAGNOSIS — E78.2 MIXED HYPERLIPIDEMIA DUE TO TYPE 2 DIABETES MELLITUS (HCC): ICD-10-CM

## 2022-02-21 DIAGNOSIS — E11.69 MIXED HYPERLIPIDEMIA DUE TO TYPE 2 DIABETES MELLITUS (HCC): ICD-10-CM

## 2022-02-21 DIAGNOSIS — E66.01 SEVERE OBESITY (HCC): ICD-10-CM

## 2022-02-21 DIAGNOSIS — J45.20 MILD INTERMITTENT ASTHMA, UNCOMPLICATED: ICD-10-CM

## 2022-02-21 DIAGNOSIS — M1A.9XX0 CHRONIC GOUT WITHOUT TOPHUS, UNSPECIFIED CAUSE, UNSPECIFIED SITE: ICD-10-CM

## 2022-02-21 LAB — HBA1C MFR BLD HPLC: 8.2 %

## 2022-02-21 PROCEDURE — G8432 DEP SCR NOT DOC, RNG: HCPCS | Performed by: NURSE PRACTITIONER

## 2022-02-21 PROCEDURE — G9899 SCRN MAM PERF RSLTS DOC: HCPCS | Performed by: NURSE PRACTITIONER

## 2022-02-21 PROCEDURE — 1101F PT FALLS ASSESS-DOCD LE1/YR: CPT | Performed by: NURSE PRACTITIONER

## 2022-02-21 PROCEDURE — G8427 DOCREV CUR MEDS BY ELIG CLIN: HCPCS | Performed by: NURSE PRACTITIONER

## 2022-02-21 PROCEDURE — 3052F HG A1C>EQUAL 8.0%<EQUAL 9.0%: CPT | Performed by: NURSE PRACTITIONER

## 2022-02-21 PROCEDURE — G8536 NO DOC ELDER MAL SCRN: HCPCS | Performed by: NURSE PRACTITIONER

## 2022-02-21 PROCEDURE — 83036 HEMOGLOBIN GLYCOSYLATED A1C: CPT | Performed by: NURSE PRACTITIONER

## 2022-02-21 PROCEDURE — 1090F PRES/ABSN URINE INCON ASSESS: CPT | Performed by: NURSE PRACTITIONER

## 2022-02-21 PROCEDURE — G8417 CALC BMI ABV UP PARAM F/U: HCPCS | Performed by: NURSE PRACTITIONER

## 2022-02-21 PROCEDURE — 3017F COLORECTAL CA SCREEN DOC REV: CPT | Performed by: NURSE PRACTITIONER

## 2022-02-21 PROCEDURE — 2022F DILAT RTA XM EVC RTNOPTHY: CPT | Performed by: NURSE PRACTITIONER

## 2022-02-21 PROCEDURE — 99214 OFFICE O/P EST MOD 30 MIN: CPT | Performed by: NURSE PRACTITIONER

## 2022-02-21 PROCEDURE — G8752 SYS BP LESS 140: HCPCS | Performed by: NURSE PRACTITIONER

## 2022-02-21 PROCEDURE — G8754 DIAS BP LESS 90: HCPCS | Performed by: NURSE PRACTITIONER

## 2022-02-21 PROCEDURE — G8399 PT W/DXA RESULTS DOCUMENT: HCPCS | Performed by: NURSE PRACTITIONER

## 2022-02-21 RX ORDER — DULAGLUTIDE 0.75 MG/.5ML
0.75 INJECTION, SOLUTION SUBCUTANEOUS
Qty: 2 ML | Refills: 0 | Status: SHIPPED | OUTPATIENT
Start: 2022-02-21 | End: 2022-03-23 | Stop reason: SDUPTHER

## 2022-02-21 RX ORDER — ALBUTEROL SULFATE 90 UG/1
2 AEROSOL, METERED RESPIRATORY (INHALATION)
COMMUNITY

## 2022-02-21 RX ORDER — PEN NEEDLE, DIABETIC 31 GX3/16"
NEEDLE, DISPOSABLE MISCELLANEOUS
Qty: 50 PEN NEEDLE | Refills: 2 | Status: SHIPPED | OUTPATIENT
Start: 2022-02-21

## 2022-02-21 NOTE — PROGRESS NOTES
Chief Complaint   Patient presents with    Diabetes    Hypertension     Pt stated that her BP has been running in the 150s at home     1. Have you been to the ER, urgent care clinic since your last visit? Hospitalized since your last visit? No    2. Have you seen or consulted any other health care providers outside of the [address] [place] [address] since your last visit? Include any pap smears or colon screening.  No

## 2022-02-21 NOTE — PROGRESS NOTES
Carole Szymanski is a 74 y.o. female who presents to the office today for the following:    Chief Complaint   Patient presents with    Diabetes    Hypertension       Past Medical History:   Diagnosis Date    Acute sinusitis 11/13/2020    Arthritis     COVID-19 11/22/2020    Cyst of ovary 11/13/2020    Essential (primary) hypertension 9/26/2017    Glaucoma 11/30/2018    Gout 8/9/2019    Hypercholesterolemia 11/13/2020    Impacted cerumen 11/13/2020    Menopause     Mild intermittent asthma, uncomplicated 4/97/0529    Mixed hyperlipidemia due to type 2 diabetes mellitus ([address] 9/26/2017    Painless rectal bleeding 11/30/2018    Type 2 diabetes mellitus without complications ([address] 3/35/4961       Past Surgical History:   Procedure Laterality Date    HX COLONOSCOPY      HX HERNIA REPAIR  Unknown date    HX KNEE ARTHROSCOPY Right 01/01/2013    HX OTHER SURGICAL  05/16/2017    Eye surgery procedure    HX PARTIAL HYSTERECTOMY  Unknown date        Family History   Problem Relation Age of Onset    OSTEOARTHRITIS Mother     Hypertension Other     Heart Disease Other     Diabetes Other         Social History     Tobacco Use    Smoking status: Former Smoker     Types: Cigarettes    Smokeless tobacco: Never Used    Tobacco comment: At least 30 years ago. Vaping Use    Vaping Use: Never used   Substance Use Topics    Alcohol use: Never    Drug use: Never        HPI  Patient here today for follow-up with PMH of asthma, hypertension, hyperlipidemia, glaucoma, gout, type 2 diabetes, obesity and arthritis. Reports taking medicine as noted in chart. Was following with Dr. Zain Fernandes prior to senior living. States that she has been doing okay since last visit but of her pioglitazone in the past 3 to 4 weeks as she feels this is making her gain weight. Was started on this by Dr. Ramon Bashir in 8/2021 due to sugars were going up.   Reports that she has been trying to monitor carbohydrates in diet but does find getting regular exercise difficult due to joint pains. Wants to see if she can be tried on something different from pioglitazone. Current Outpatient Medications on File Prior to Visit   Medication Sig    albuterol (PROVENTIL HFA, VENTOLIN HFA, PROAIR HFA) 90 mcg/actuation inhaler Take 2 Puffs by inhalation every four (4) hours as needed for Wheezing.  atenoloL (TENORMIN) 50 mg tablet Take 1 Tablet by mouth daily.  glipiZIDE (GLUCOTROL) 5 mg tablet Take 2 Tablets by mouth two (2) times a day.  lisinopriL (PRINIVIL, ZESTRIL) 20 mg tablet Take 1 Tablet by mouth daily.  pravastatin (PRAVACHOL) 40 mg tablet Take 1 Tablet by mouth daily.  cloNIDine HCL (CATAPRES) 0.2 mg tablet Take 1 Tablet by mouth two (2) times a day.  hydroCHLOROthiazide (HYDRODIURIL) 25 mg tablet Take 1 Tablet by mouth daily.  albuterol (PROVENTIL VENTOLIN) 2.5 mg /3 mL (0.083 %) nebu INHALE THE CONTENTS OF 1 VIAL VIA NEBULIZER FOUR TIMES DAILY AS NEEDED    metFORMIN (GLUCOPHAGE) 1,000 mg tablet TAKE 1 TAB BY MOUTH TWO (2) TIMES DAILY (WITH MEALS).  True Metrix Glucose Test Strip strip TEST BLOOD SUGAR EVERY DAY    allopurinoL (ZYLOPRIM) 100 mg tablet TAKE 1 TABLET EVERY DAY    amLODIPine (NORVASC) 10 mg tablet TAKE 1 TABLET EVERY DAY    Wixela Inhub 100-50 mcg/dose diskus inhaler Take 1 Puff by inhalation two (2) times a day.  timolol (TIMOPTIC) 0.5 % ophthalmic solution     BD Single Use Swabs Regular padm USE  TO TEST BLOOD SUGAR EVERY DAY    brimonidine (ALPHAGAN) 0.2 % ophthalmic solution Administer 1 Drop to left eye three (3) times daily.  dorzolamide (TRUSOPT) 2 % ophthalmic solution Administer 1 Drop to left eye three (3) times daily.  latanoprost (XALATAN) 0.005 % ophthalmic solution Administer 2 Drops to both eyes three (3) times daily.  TRUEplus Lancets 28 gauge misc USE TO CHECK BLOOD SUGAR DAILY    pioglitazone (ACTOS) 30 mg tablet Take 1 Tablet by mouth nightly.  (Patient not taking: Reported on 2022)     No current facility-administered medications on file prior to visit. Medications Ordered Today   Medications    dulaglutide (Trulicity) 2.18 CY/3.5 mL sub-q pen     Si.5 mL by SubCUTAneous route every seven (7) days for 30 days. Dispense:  2 mL     Refill:  0    Insulin Needles, Disposable, 32 gauge x 5/32\" ndle     Sig: Use pen needle to inject medicine every 7 days     Dispense:  50 Pen Needle     Refill:  2     [facility]#[id number]        Review of Systems   Constitutional: Negative. HENT: Negative. Eyes: Negative. Respiratory: Negative. Cardiovascular: Negative. Gastrointestinal: Negative. Genitourinary: Negative. Musculoskeletal: Positive for back pain, joint pain and myalgias. Negative for falls and neck pain. Skin: Negative. Neurological: Negative. Endo/Heme/Allergies: Negative for polydipsia. Psychiatric/Behavioral: Negative. Visit Vitals  /85 (BP 1 Location: Left upper arm, BP Patient Position: Sitting, BP Cuff Size: Large adult)   Pulse 84   Temp 97.8 °F (36.6 °C) (Temporal)   Resp 20   Ht 5' 6\" (1.676 m)   Wt 257 lb 9.6 oz (116.8 kg)   SpO2 97%   BMI 41.58 kg/m²       Physical Exam  Vitals and nursing note reviewed. Constitutional:       Appearance: Normal appearance. She is obese. Neck:      Vascular: No carotid bruit. Cardiovascular:      Rate and Rhythm: Normal rate and regular rhythm. Pulses: Normal pulses. Heart sounds: Normal heart sounds. Pulmonary:      Effort: Pulmonary effort is normal.      Breath sounds: Normal breath sounds. Abdominal:      General: Bowel sounds are normal.      Palpations: Abdomen is soft. Tenderness: There is no abdominal tenderness. There is no guarding. Musculoskeletal:         General: Normal range of motion. Right lower leg: Edema (trace) present. Left lower leg: Edema (trace) present. Lymphadenopathy:      Cervical: No cervical adenopathy.    Skin: General: Skin is warm and dry. Neurological:      Mental Status: She is alert and oriented to person, place, and time. Mental status is at baseline. Gait: Gait abnormal.   Psychiatric:         Mood and Affect: Mood normal.         Behavior: Behavior normal.            1. Type 2 diabetes mellitus without complication, without long-term current use of insulin ([facility])  Lab Results   Component Value Date/Time    Hemoglobin A1c 10.1 (H) 03/23/2021 12:29 PM    Hemoglobin A1c (POC) 8.2 02/14/2022 02:33 PM    Hemoglobin A1c, External 8.0 02/10/2020 12:00 AM   Currently on Metformin 1000 mg twice daily, glipizide 10 mg twice daily and pioglitazone 30 mg daily (stopped in the past 3 weeks due to she feels this is making her gain weight)  Did not bring meter but reports fasting sugars around 150 or so  States she has been trying to follow a diabetic diet along with increasing her exercise as much as possible (is limited due to pain in her left knee)    She has had a considerable weight gain since the pioglitazone in 8/2021 from Dr. Mehdi Santacruz  Has not been tried on other medications in past per patient  We will continue on the Metformin and glipizide as directed  Start on Trulicity 7.34 mg every 7 days if covered. We reviewed potential side effects and no contraindications present. Continue to encourage following diabetic diet along with getting regular exercise 3-5 times weekly  Check fasting sugars and bring to next appointment  Continue regular eye exams  Checking fasting labs today  - AMB POC HEMOGLOBIN A1C    2. Essential (primary) hypertension  Blood pressure is fairly controlled and continue medication as directed  He will with some weight loss blood pressure will improve also    3.  Mixed hyperlipidemia due to type 2 diabetes mellitus [facility])  Lab Results   Component Value Date/Time    LDL,Direct 90 11/23/2020 08:35 PM    LDL, calculated 126 (H) 08/03/2021 03:42 PM    LDL, calculated 101 (H) 08/10/2020 11:00 AM Essentially stable  Continues on pravastatin as directed    4. Mild intermittent asthma, uncomplicated  Symptoms stable  On Wixela and albuterol as needed    5. Osteoarthritis involving multiple joints on both sides of body  She is having increased pain in her joints and feels this is due to her recent weight gain  Also has told that she may need a knee replacement on the left side  We will continue on Tylenol and topical diclofenac as needed    6. Severe obesity ([address]  She has had weight gain since 8/2021. She is going to work on following diabetic diet along with increasing her exercise as much as possible. Also starting on Trulicity which may help with weight loss    7. Chronic gout without tophus, unspecified cause, unspecified site  Stable and on allopurinol as directed    8. Glaucoma of both eyes, unspecified glaucoma type  She continues on eyedrops as directed and followed by ophthalmology      Patient verbalizes understanding of plan of care as discussed above    Follow-up and Dispositions    · Return in about 4 weeks (around 3/21/2022) for or sooner for worsening symptoms.

## 2022-02-22 LAB
ALBUMIN SERPL-MCNC: 4.4 G/DL (ref 3.7–4.7)
ALBUMIN/CREAT UR: 298 MG/G CREAT (ref 0–29)
ALBUMIN/GLOB SERPL: 1.4 {RATIO} (ref 1.2–2.2)
ALP SERPL-CCNC: 83 IU/L (ref 44–121)
ALT SERPL-CCNC: 17 IU/L (ref 0–32)
APPEARANCE UR: CLEAR
AST SERPL-CCNC: 13 IU/L (ref 0–40)
BACTERIA #/AREA URNS HPF: NORMAL /[HPF]
BASOPHILS # BLD AUTO: 0 X10E3/UL (ref 0–0.2)
BASOPHILS NFR BLD AUTO: 1 %
BILIRUB SERPL-MCNC: 0.2 MG/DL (ref 0–1.2)
BILIRUB UR QL STRIP: NEGATIVE
BUN SERPL-MCNC: 10 MG/DL (ref 8–27)
BUN/CREAT SERPL: 13 (ref 12–28)
CALCIUM SERPL-MCNC: 10.1 MG/DL (ref 8.7–10.3)
CASTS URNS QL MICRO: NORMAL /LPF
CHLORIDE SERPL-SCNC: 97 MMOL/L (ref 96–106)
CHOLEST SERPL-MCNC: 184 MG/DL (ref 100–199)
CO2 SERPL-SCNC: 27 MMOL/L (ref 20–29)
COLOR UR: YELLOW
CREAT SERPL-MCNC: 0.76 MG/DL (ref 0.57–1)
CREAT UR-MCNC: 42.5 MG/DL
EOSINOPHIL # BLD AUTO: 0.2 X10E3/UL (ref 0–0.4)
EOSINOPHIL NFR BLD AUTO: 2 %
EPI CELLS #/AREA URNS HPF: NORMAL /HPF (ref 0–10)
ERYTHROCYTE [DISTWIDTH] IN BLOOD BY AUTOMATED COUNT: 13.3 % (ref 11.7–15.4)
GLOBULIN SER CALC-MCNC: 3.2 G/DL (ref 1.5–4.5)
GLUCOSE SERPL-MCNC: 174 MG/DL (ref 65–99)
GLUCOSE UR QL STRIP: NEGATIVE
HCT VFR BLD AUTO: 38 % (ref 34–46.6)
HDLC SERPL-MCNC: 54 MG/DL
HGB BLD-MCNC: 12.4 G/DL (ref 11.1–15.9)
HGB UR QL STRIP: NEGATIVE
IMM GRANULOCYTES # BLD AUTO: 0 X10E3/UL (ref 0–0.1)
IMM GRANULOCYTES NFR BLD AUTO: 0 %
KETONES UR QL STRIP: NEGATIVE
LDLC SERPL CALC-MCNC: 110 MG/DL (ref 0–99)
LEUKOCYTE ESTERASE UR QL STRIP: NEGATIVE
LYMPHOCYTES # BLD AUTO: 1.6 X10E3/UL (ref 0.7–3.1)
LYMPHOCYTES NFR BLD AUTO: 22 %
MCH RBC QN AUTO: 28.1 PG (ref 26.6–33)
MCHC RBC AUTO-ENTMCNC: 32.6 G/DL (ref 31.5–35.7)
MCV RBC AUTO: 86 FL (ref 79–97)
MICRO URNS: ABNORMAL
MICROALBUMIN UR-MCNC: 126.5 UG/ML
MONOCYTES # BLD AUTO: 0.6 X10E3/UL (ref 0.1–0.9)
MONOCYTES NFR BLD AUTO: 8 %
NEUTROPHILS # BLD AUTO: 4.9 X10E3/UL (ref 1.4–7)
NEUTROPHILS NFR BLD AUTO: 67 %
NITRITE UR QL STRIP: NEGATIVE
PH UR STRIP: 7 [PH] (ref 5–7.5)
PLATELET # BLD AUTO: 316 X10E3/UL (ref 150–450)
POTASSIUM SERPL-SCNC: 4.5 MMOL/L (ref 3.5–5.2)
PROT SERPL-MCNC: 7.6 G/DL (ref 6–8.5)
PROT UR QL STRIP: ABNORMAL
RBC # BLD AUTO: 4.42 X10E6/UL (ref 3.77–5.28)
RBC #/AREA URNS HPF: NORMAL /HPF (ref 0–2)
SODIUM SERPL-SCNC: 140 MMOL/L (ref 134–144)
SP GR UR STRIP: 1.01 (ref 1–1.03)
TRIGL SERPL-MCNC: 109 MG/DL (ref 0–149)
TSH SERPL DL<=0.005 MIU/L-ACNC: 0.81 UIU/ML (ref 0.45–4.5)
UROBILINOGEN UR STRIP-MCNC: 0.2 MG/DL (ref 0.2–1)
VLDLC SERPL CALC-MCNC: 20 MG/DL (ref 5–40)
WBC # BLD AUTO: 7.3 X10E3/UL (ref 3.4–10.8)
WBC #/AREA URNS HPF: NORMAL /HPF (ref 0–5)

## 2022-02-24 ENCOUNTER — TELEPHONE (OUTPATIENT)
Dept: PRIMARY CARE CLINIC | Age: 72
End: 2022-02-24

## 2022-02-24 NOTE — TELEPHONE ENCOUNTER
Patient would like for you to give her a call. Has some questions about the Trulicity that you prescribed. She was reading the information pamphlet  About the side effects. Unsure if she wants to take this medication. Please advise.

## 2022-03-01 NOTE — TELEPHONE ENCOUNTER
Spoke with patient and reports having some nausea and diarrhea the first day she took medicine but not now. Has also stopped the pioglitazone. Did discuss concerns about not taking any thing to replace and that nausea may resolve after takes for few weeks but she declines to take. Wants to work on controlling sugars with diet and exercise along with other medicines she is on (metformin and glipizide). Will reevaluate at her  next appointment 3/23/22.

## 2022-03-18 PROBLEM — Z96.659 HISTORY OF TOTAL KNEE REPLACEMENT: Status: ACTIVE | Noted: 2017-06-30

## 2022-03-18 PROBLEM — E78.00 HYPERCHOLESTEROLEMIA: Status: ACTIVE | Noted: 2020-11-13

## 2022-03-18 PROBLEM — J45.909 ASTHMA: Status: ACTIVE | Noted: 2020-02-10

## 2022-03-19 PROBLEM — I10 ESSENTIAL (PRIMARY) HYPERTENSION: Status: ACTIVE | Noted: 2017-09-26

## 2022-03-19 PROBLEM — E78.2 MIXED HYPERLIPIDEMIA DUE TO TYPE 2 DIABETES MELLITUS (HCC): Status: ACTIVE | Noted: 2017-09-26

## 2022-03-19 PROBLEM — H40.9 GLAUCOMA: Status: ACTIVE | Noted: 2018-11-30

## 2022-03-19 PROBLEM — E11.69 MIXED HYPERLIPIDEMIA DUE TO TYPE 2 DIABETES MELLITUS (HCC): Status: ACTIVE | Noted: 2017-09-26

## 2022-03-19 PROBLEM — M15.9 OSTEOARTHRITIS INVOLVING MULTIPLE JOINTS ON BOTH SIDES OF BODY: Status: ACTIVE | Noted: 2017-08-08

## 2022-03-19 PROBLEM — M10.9 GOUT: Status: ACTIVE | Noted: 2019-08-09

## 2022-03-19 PROBLEM — J45.20 MILD INTERMITTENT ASTHMA, UNCOMPLICATED: Status: ACTIVE | Noted: 2020-02-10

## 2022-03-20 PROBLEM — E11.9 TYPE 2 DIABETES MELLITUS WITHOUT COMPLICATIONS (HCC): Status: ACTIVE | Noted: 2017-09-26

## 2022-03-20 PROBLEM — E66.01 SEVERE OBESITY (HCC): Status: ACTIVE | Noted: 2020-08-10

## 2022-03-23 ENCOUNTER — OFFICE VISIT (OUTPATIENT)
Dept: PRIMARY CARE CLINIC | Age: 72
End: 2022-03-23
Payer: MEDICARE

## 2022-03-23 VITALS
BODY MASS INDEX: 40.82 KG/M2 | RESPIRATION RATE: 20 BRPM | SYSTOLIC BLOOD PRESSURE: 165 MMHG | HEART RATE: 86 BPM | TEMPERATURE: 97.6 F | WEIGHT: 254 LBS | OXYGEN SATURATION: 99 % | HEIGHT: 66 IN | DIASTOLIC BLOOD PRESSURE: 82 MMHG

## 2022-03-23 DIAGNOSIS — I10 ESSENTIAL (PRIMARY) HYPERTENSION: ICD-10-CM

## 2022-03-23 DIAGNOSIS — E11.9 TYPE 2 DIABETES MELLITUS WITHOUT COMPLICATION, WITHOUT LONG-TERM CURRENT USE OF INSULIN (HCC): ICD-10-CM

## 2022-03-23 DIAGNOSIS — I10 ESSENTIAL (PRIMARY) HYPERTENSION: Primary | ICD-10-CM

## 2022-03-23 PROCEDURE — 2022F DILAT RTA XM EVC RTNOPTHY: CPT | Performed by: NURSE PRACTITIONER

## 2022-03-23 PROCEDURE — G9899 SCRN MAM PERF RSLTS DOC: HCPCS | Performed by: NURSE PRACTITIONER

## 2022-03-23 PROCEDURE — G8536 NO DOC ELDER MAL SCRN: HCPCS | Performed by: NURSE PRACTITIONER

## 2022-03-23 PROCEDURE — G8754 DIAS BP LESS 90: HCPCS | Performed by: NURSE PRACTITIONER

## 2022-03-23 PROCEDURE — G8753 SYS BP > OR = 140: HCPCS | Performed by: NURSE PRACTITIONER

## 2022-03-23 PROCEDURE — 99214 OFFICE O/P EST MOD 30 MIN: CPT | Performed by: NURSE PRACTITIONER

## 2022-03-23 PROCEDURE — 1101F PT FALLS ASSESS-DOCD LE1/YR: CPT | Performed by: NURSE PRACTITIONER

## 2022-03-23 PROCEDURE — G8432 DEP SCR NOT DOC, RNG: HCPCS | Performed by: NURSE PRACTITIONER

## 2022-03-23 PROCEDURE — G8399 PT W/DXA RESULTS DOCUMENT: HCPCS | Performed by: NURSE PRACTITIONER

## 2022-03-23 PROCEDURE — 3017F COLORECTAL CA SCREEN DOC REV: CPT | Performed by: NURSE PRACTITIONER

## 2022-03-23 PROCEDURE — G8427 DOCREV CUR MEDS BY ELIG CLIN: HCPCS | Performed by: NURSE PRACTITIONER

## 2022-03-23 PROCEDURE — 3052F HG A1C>EQUAL 8.0%<EQUAL 9.0%: CPT | Performed by: NURSE PRACTITIONER

## 2022-03-23 PROCEDURE — 1090F PRES/ABSN URINE INCON ASSESS: CPT | Performed by: NURSE PRACTITIONER

## 2022-03-23 PROCEDURE — G8417 CALC BMI ABV UP PARAM F/U: HCPCS | Performed by: NURSE PRACTITIONER

## 2022-03-23 RX ORDER — LISINOPRIL 20 MG/1
20 TABLET ORAL DAILY
Qty: 90 TABLET | Refills: 0 | Status: SHIPPED | OUTPATIENT
Start: 2022-03-23 | End: 2022-04-20

## 2022-03-23 RX ORDER — LISINOPRIL 20 MG/1
20 TABLET ORAL DAILY
Qty: 90 TABLET | Refills: 0 | Status: SHIPPED | OUTPATIENT
Start: 2022-03-23 | End: 2022-03-23 | Stop reason: SDUPTHER

## 2022-03-23 RX ORDER — DULAGLUTIDE 0.75 MG/.5ML
0.75 INJECTION, SOLUTION SUBCUTANEOUS
Qty: 6 ML | Refills: 0 | Status: SHIPPED | OUTPATIENT
Start: 2022-03-23 | End: 2022-04-20 | Stop reason: SDUPTHER

## 2022-03-23 RX ORDER — INSULIN GLARGINE 100 [IU]/ML
10 INJECTION, SOLUTION SUBCUTANEOUS DAILY
Qty: 9 ML | Refills: 0 | Status: SHIPPED | OUTPATIENT
Start: 2022-03-23 | End: 2022-06-21

## 2022-03-23 NOTE — PROGRESS NOTES
Chief Complaint   Patient presents with    Medication Evaluation    Hypertension    Pre-op Exam     Pt also here to have form filled out for preop clearance for shoulder surgery     1 month F/u    1. Have you been to the ER, urgent care clinic since your last visit? Hospitalized since your last visit? No    2. Have you seen or consulted any other health care providers outside of the [address] [place] [address] since your last visit? Include any pap smears or colon screening.  No

## 2022-03-23 NOTE — PROGRESS NOTES
Dwayne Lundy is a 74 y.o. female who presents to the office today for the following:    Chief Complaint   Patient presents with    Medication Evaluation    Hypertension       Past Medical History:   Diagnosis Date    Acute sinusitis 11/13/2020    Arthritis     COVID-19 11/22/2020    Cyst of ovary 11/13/2020    Essential (primary) hypertension 9/26/2017    Glaucoma 11/30/2018    Gout 8/9/2019    Hypercholesterolemia 11/13/2020    Impacted cerumen 11/13/2020    Menopause     Mild intermittent asthma, uncomplicated 1/75/3363    Mixed hyperlipidemia due to type 2 diabetes mellitus ([address] 9/26/2017    Painless rectal bleeding 11/30/2018    Type 2 diabetes mellitus without complications ([facility] 4/53/2767       Past Surgical History:   Procedure Laterality Date    HX COLONOSCOPY      HX HERNIA REPAIR  Unknown date    HX KNEE ARTHROSCOPY Right 01/01/2013    HX OTHER SURGICAL  05/16/2017    Eye surgery procedure    HX PARTIAL HYSTERECTOMY  Unknown date        Family History   Problem Relation Age of Onset    OSTEOARTHRITIS Mother     Hypertension Other     Heart Disease Other     Diabetes Other         Social History     Tobacco Use    Smoking status: Former Smoker     Types: Cigarettes    Smokeless tobacco: Never Used    Tobacco comment: At least 30 years ago. Vaping Use    Vaping Use: Never used   Substance Use Topics    Alcohol use: Never    Drug use: Never      HPI  Patient here today for 1 monthfollow-up diabetes and hypertension with PMH of asthma, hypertension, hyperlipidemia, glaucoma, gout, type 2 diabetes, obesity and arthritis. Reports taking medicine as noted in chart. States that sugars have still been elevated since starting the trulicity and coming off pioglitazone. Average fasting around 160s-200. Has only had about 2 doses so far. Went to  Her orthopedic yesterday and was recommended for surgery but told needed to get sugars and blood pressure down.      Current Outpatient Medications on File Prior to Visit   Medication Sig    albuterol (PROVENTIL HFA, VENTOLIN HFA, PROAIR HFA) 90 mcg/actuation inhaler Take 2 Puffs by inhalation every four (4) hours as needed for Wheezing.  Insulin Needles, Disposable, 32 gauge x 5/32\" ndle Use pen needle to inject medicine every 7 days    [DISCONTINUED] dulaglutide (Trulicity) 0.05 HT/4.9 mL sub-q pen 0.5 mL by SubCUTAneous route every seven (7) days for 30 days.  atenoloL (TENORMIN) 50 mg tablet Take 1 Tablet by mouth daily.  glipiZIDE (GLUCOTROL) 5 mg tablet Take 2 Tablets by mouth two (2) times a day.  pravastatin (PRAVACHOL) 40 mg tablet Take 1 Tablet by mouth daily.  cloNIDine HCL (CATAPRES) 0.2 mg tablet Take 1 Tablet by mouth two (2) times a day.  hydroCHLOROthiazide (HYDRODIURIL) 25 mg tablet Take 1 Tablet by mouth daily.  [DISCONTINUED] lisinopriL (PRINIVIL, ZESTRIL) 20 mg tablet Take 1 Tablet by mouth daily.  albuterol (PROVENTIL VENTOLIN) 2.5 mg /3 mL (0.083 %) nebu INHALE THE CONTENTS OF 1 VIAL VIA NEBULIZER FOUR TIMES DAILY AS NEEDED    metFORMIN (GLUCOPHAGE) 1,000 mg tablet TAKE 1 TAB BY MOUTH TWO (2) TIMES DAILY (WITH MEALS).  True Metrix Glucose Test Strip strip TEST BLOOD SUGAR EVERY DAY    allopurinoL (ZYLOPRIM) 100 mg tablet TAKE 1 TABLET EVERY DAY    [DISCONTINUED] pioglitazone (ACTOS) 30 mg tablet Take 1 Tablet by mouth nightly. (Patient not taking: Reported on 2/21/2022)    amLODIPine (NORVASC) 10 mg tablet TAKE 1 TABLET EVERY DAY    Wixela Inhub 100-50 mcg/dose diskus inhaler Take 1 Puff by inhalation two (2) times a day.  timolol (TIMOPTIC) 0.5 % ophthalmic solution     BD Single Use Swabs Regular padm USE  TO TEST BLOOD SUGAR EVERY DAY    brimonidine (ALPHAGAN) 0.2 % ophthalmic solution Administer 1 Drop to left eye three (3) times daily.  dorzolamide (TRUSOPT) 2 % ophthalmic solution Administer 1 Drop to left eye three (3) times daily.     latanoprost (XALATAN) 0.005 % ophthalmic solution Administer 2 Drops to both eyes three (3) times daily.  TRUEplus Lancets 28 gauge misc USE TO CHECK BLOOD SUGAR DAILY     No current facility-administered medications on file prior to visit. Medications Ordered Today   Medications    lisinopriL (PRINIVIL, ZESTRIL) 20 mg tablet     Sig: Take 1 Tablet by mouth daily. Dispense:  90 Tablet     Refill:  0    dulaglutide (Trulicity) 8.09 EV/3.6 mL sub-q pen     Si.5 mL by SubCUTAneous route every seven (7) days for 90 days. Dispense:  6 mL     Refill:  0    insulin glargine (LANTUS,BASAGLAR) 100 unit/mL (3 mL) inpn     Sig: 10 Units by SubCUTAneous route daily for 90 days. Dispense:  9 mL     Refill:  0        Review of Systems   Constitutional: Negative. HENT: Negative. Eyes: Negative. Respiratory: Negative. Cardiovascular: Negative. Gastrointestinal: Negative. Genitourinary: Negative. Musculoskeletal: Positive for back pain, joint pain and myalgias. Negative for falls and neck pain. Skin: Negative. Neurological: Negative. Endo/Heme/Allergies: Negative for polydipsia. Psychiatric/Behavioral: Negative. Visit Vitals  BP (!) 165/82   Pulse 86   Temp 97.6 °F (36.4 °C) (Temporal)   Resp 20   Ht 5' 6\" (1.676 m)   Wt 254 lb (115.2 kg)   SpO2 99%   BMI 41.00 kg/m²       Physical Exam  Vitals and nursing note reviewed. Constitutional:       Appearance: Normal appearance. She is obese. Neck:      Vascular: No carotid bruit. Cardiovascular:      Rate and Rhythm: Normal rate and regular rhythm. Pulses: Normal pulses. Heart sounds: Normal heart sounds. Pulmonary:      Effort: Pulmonary effort is normal.      Breath sounds: Normal breath sounds. Abdominal:      General: Bowel sounds are normal.      Palpations: Abdomen is soft. Tenderness: There is no abdominal tenderness. There is no guarding. Musculoskeletal:         General: Normal range of motion.       Right lower leg: Edema (trace) present. Left lower leg: Edema (trace) present. Lymphadenopathy:      Cervical: No cervical adenopathy. Skin:     General: Skin is warm and dry. Neurological:      Mental Status: She is alert and oriented to person, place, and time. Mental status is at baseline. Gait: Gait abnormal.   Psychiatric:         Mood and Affect: Mood normal.         Behavior: Behavior normal.            1. Type 2 diabetes mellitus without complication, without long-term current use of insulin ([facility])  Lab Results   Component Value Date/Time    Hemoglobin A1c 10.1 (H) 03/23/2021 12:29 PM    Hemoglobin A1c (POC) 8.2 02/14/2022 02:33 PM    Hemoglobin A1c, External 8.0 02/10/2020 12:00 AM   Currently on Metformin 1000 mg twice daily, glipizide 10 mg twice daily and trulicity 5.23LI every 7 days  Did not bring meter but reports fasting sugars around 160-200 s  States she has been trying to follow a diabetic diet along with increasing her exercise as much as possible (is limited due to pain in her left knee)    Stopped pioglitazone due to weight gain and started on trulicity  Sugars have been elevated since change but only had about 2 doses so far  Weight is down 4lbs  We will continue on the Metformin, trulicity and glipizide as directed  I feel sugars will begin to come down as she is on the trulicity consistently but is planning orthopedic surgery in the next 1-2 months. Will add Lantus 10 units nightly and reviewed potential effects and hypoglycemia sx  Continue to encourage following diabetic diet along with getting regular exercise 3-5 times weekly  Check fasting sugars and bring to next appointment  Continue regular eye exams  Will re-evaluate in 4 weeks      2.  Essential (primary) hypertension  Blood pressure is reported as being under 379 systolic but not reflective at office visit  She is on multiple medications for blood pressure and feel anxiety could contribute to higher readings  She will bring meter from home to next visit as checks every morning      Patient verbalizes understanding of plan of care as discussed above    Follow-up and Dispositions    · Return in about 4 weeks (around 4/20/2022) for or sooner for worsening symptoms.

## 2022-03-23 NOTE — TELEPHONE ENCOUNTER
Lisinopril goes to [facility] to pt will repend to you.  The other two go to [facility] which you have already sent  goes to [facility]

## 2022-04-05 ENCOUNTER — TELEPHONE (OUTPATIENT)
Dept: PRIMARY CARE CLINIC | Age: 72
End: 2022-04-05

## 2022-04-05 DIAGNOSIS — E11.9 TYPE 2 DIABETES MELLITUS WITHOUT COMPLICATION, WITHOUT LONG-TERM CURRENT USE OF INSULIN (HCC): ICD-10-CM

## 2022-04-05 RX ORDER — METFORMIN HYDROCHLORIDE 1000 MG/1
1000 TABLET ORAL 2 TIMES DAILY WITH MEALS
Qty: 30 TABLET | Refills: 0 | Status: CANCELLED | OUTPATIENT
Start: 2022-04-05

## 2022-04-05 RX ORDER — METFORMIN HYDROCHLORIDE 1000 MG/1
1000 TABLET ORAL 2 TIMES DAILY WITH MEALS
Qty: 60 TABLET | Refills: 2 | Status: SHIPPED | OUTPATIENT
Start: 2022-04-05 | End: 2022-06-19

## 2022-04-05 NOTE — TELEPHONE ENCOUNTER
Brad 18 left message about patient's metformin which she will not receive until the end of the week. Wanting to know if we could send emergency supply to [facility] as the patient is completely out.

## 2022-04-12 DIAGNOSIS — I10 ESSENTIAL (PRIMARY) HYPERTENSION: Primary | ICD-10-CM

## 2022-04-12 RX ORDER — AMLODIPINE BESYLATE 10 MG/1
TABLET ORAL
Qty: 90 TABLET | Refills: 1 | Status: SHIPPED | OUTPATIENT
Start: 2022-04-12 | End: 2022-10-04

## 2022-04-20 ENCOUNTER — OFFICE VISIT (OUTPATIENT)
Dept: PRIMARY CARE CLINIC | Age: 72
End: 2022-04-20
Payer: MEDICARE

## 2022-04-20 DIAGNOSIS — L28.2 PRURITIC RASH: ICD-10-CM

## 2022-04-20 DIAGNOSIS — E11.9 TYPE 2 DIABETES MELLITUS WITHOUT COMPLICATION, WITHOUT LONG-TERM CURRENT USE OF INSULIN (HCC): ICD-10-CM

## 2022-04-20 DIAGNOSIS — E11.69 MIXED HYPERLIPIDEMIA DUE TO TYPE 2 DIABETES MELLITUS (HCC): ICD-10-CM

## 2022-04-20 DIAGNOSIS — R94.31 ABNORMAL EKG: ICD-10-CM

## 2022-04-20 DIAGNOSIS — E66.01 SEVERE OBESITY (HCC): ICD-10-CM

## 2022-04-20 DIAGNOSIS — I10 ESSENTIAL (PRIMARY) HYPERTENSION: ICD-10-CM

## 2022-04-20 DIAGNOSIS — J45.20 MILD INTERMITTENT ASTHMA, UNCOMPLICATED: ICD-10-CM

## 2022-04-20 DIAGNOSIS — I10 ESSENTIAL (PRIMARY) HYPERTENSION: Primary | ICD-10-CM

## 2022-04-20 DIAGNOSIS — Z01.818 PREOP EXAMINATION: ICD-10-CM

## 2022-04-20 DIAGNOSIS — M1A.9XX0 CHRONIC GOUT WITHOUT TOPHUS, UNSPECIFIED CAUSE, UNSPECIFIED SITE: ICD-10-CM

## 2022-04-20 DIAGNOSIS — E78.2 MIXED HYPERLIPIDEMIA DUE TO TYPE 2 DIABETES MELLITUS (HCC): ICD-10-CM

## 2022-04-20 DIAGNOSIS — H40.9 GLAUCOMA OF BOTH EYES, UNSPECIFIED GLAUCOMA TYPE: ICD-10-CM

## 2022-04-20 DIAGNOSIS — M15.9 OSTEOARTHRITIS INVOLVING MULTIPLE JOINTS ON BOTH SIDES OF BODY: ICD-10-CM

## 2022-04-20 LAB — HBA1C MFR BLD HPLC: 7.6 %

## 2022-04-20 PROCEDURE — G8536 NO DOC ELDER MAL SCRN: HCPCS | Performed by: NURSE PRACTITIONER

## 2022-04-20 PROCEDURE — G8399 PT W/DXA RESULTS DOCUMENT: HCPCS | Performed by: NURSE PRACTITIONER

## 2022-04-20 PROCEDURE — 99214 OFFICE O/P EST MOD 30 MIN: CPT | Performed by: NURSE PRACTITIONER

## 2022-04-20 PROCEDURE — 93000 ELECTROCARDIOGRAM COMPLETE: CPT | Performed by: NURSE PRACTITIONER

## 2022-04-20 PROCEDURE — G8753 SYS BP > OR = 140: HCPCS | Performed by: NURSE PRACTITIONER

## 2022-04-20 PROCEDURE — 3017F COLORECTAL CA SCREEN DOC REV: CPT | Performed by: NURSE PRACTITIONER

## 2022-04-20 PROCEDURE — 83036 HEMOGLOBIN GLYCOSYLATED A1C: CPT | Performed by: NURSE PRACTITIONER

## 2022-04-20 PROCEDURE — G9899 SCRN MAM PERF RSLTS DOC: HCPCS | Performed by: NURSE PRACTITIONER

## 2022-04-20 PROCEDURE — G8417 CALC BMI ABV UP PARAM F/U: HCPCS | Performed by: NURSE PRACTITIONER

## 2022-04-20 PROCEDURE — 3051F HG A1C>EQUAL 7.0%<8.0%: CPT | Performed by: NURSE PRACTITIONER

## 2022-04-20 PROCEDURE — 2022F DILAT RTA XM EVC RTNOPTHY: CPT | Performed by: NURSE PRACTITIONER

## 2022-04-20 PROCEDURE — 1090F PRES/ABSN URINE INCON ASSESS: CPT | Performed by: NURSE PRACTITIONER

## 2022-04-20 PROCEDURE — 1101F PT FALLS ASSESS-DOCD LE1/YR: CPT | Performed by: NURSE PRACTITIONER

## 2022-04-20 PROCEDURE — G8427 DOCREV CUR MEDS BY ELIG CLIN: HCPCS | Performed by: NURSE PRACTITIONER

## 2022-04-20 PROCEDURE — G8432 DEP SCR NOT DOC, RNG: HCPCS | Performed by: NURSE PRACTITIONER

## 2022-04-20 PROCEDURE — G8754 DIAS BP LESS 90: HCPCS | Performed by: NURSE PRACTITIONER

## 2022-04-20 RX ORDER — LISINOPRIL 20 MG/1
TABLET ORAL
Qty: 90 TABLET | Refills: 0 | Status: SHIPPED | OUTPATIENT
Start: 2022-04-20

## 2022-04-20 RX ORDER — HYDROCHLOROTHIAZIDE 25 MG/1
TABLET ORAL
Qty: 90 TABLET | Refills: 0 | Status: SHIPPED | OUTPATIENT
Start: 2022-04-20 | End: 2022-09-07

## 2022-04-20 RX ORDER — ATENOLOL 50 MG/1
TABLET ORAL
Qty: 90 TABLET | Refills: 0 | Status: SHIPPED | OUTPATIENT
Start: 2022-04-20 | End: 2022-09-25 | Stop reason: ALTCHOICE

## 2022-04-20 RX ORDER — TRIAMCINOLONE ACETONIDE 1 MG/G
OINTMENT TOPICAL 2 TIMES DAILY
Qty: 30 G | Refills: 0 | Status: SHIPPED | OUTPATIENT
Start: 2022-04-20 | End: 2022-04-21 | Stop reason: SDUPTHER

## 2022-04-20 RX ORDER — CLONIDINE HYDROCHLORIDE 0.2 MG/1
0.2 TABLET ORAL 3 TIMES DAILY
Qty: 270 TABLET | Refills: 0 | Status: SHIPPED | OUTPATIENT
Start: 2022-04-20 | End: 2022-07-19

## 2022-04-20 RX ORDER — PRAVASTATIN SODIUM 40 MG/1
TABLET ORAL
Qty: 90 TABLET | Refills: 0 | Status: SHIPPED | OUTPATIENT
Start: 2022-04-20 | End: 2022-09-07

## 2022-04-20 RX ORDER — DULAGLUTIDE 0.75 MG/.5ML
0.75 INJECTION, SOLUTION SUBCUTANEOUS
Qty: 6 ML | Refills: 0 | Status: SHIPPED | OUTPATIENT
Start: 2022-04-20 | End: 2022-06-20

## 2022-04-20 RX ORDER — CLONIDINE HYDROCHLORIDE 0.2 MG/1
TABLET ORAL
Qty: 180 TABLET | Refills: 0 | Status: SHIPPED | OUTPATIENT
Start: 2022-04-20 | End: 2022-04-20 | Stop reason: ALTCHOICE

## 2022-04-20 RX ORDER — GLIPIZIDE 5 MG/1
TABLET ORAL
Qty: 360 TABLET | Refills: 0 | Status: SHIPPED | OUTPATIENT
Start: 2022-04-20 | End: 2022-09-07

## 2022-04-20 NOTE — PROGRESS NOTES
Chief Complaint   Patient presents with    Pre-op Exam    Breast Problem     Needs form signed for shoulder surgery, also pt has a spot on her right breast she wants looked at. 1. \"Have you been to the ER, urgent care clinic since your last visit? Hospitalized since your last visit? \" No    2. \"Have you seen or consulted any other health care providers outside of the [address] [place] [address] since your last visit? \" yes [facility]     3. For patients aged 39-70: Has the patient had a colonoscopy / FIT/ Cologuard? Yes - no Care Gap present      If the patient is female:    4. For patients aged 41-77: Has the patient had a mammogram within the past 2 years? NA - based on age or sex      11. For patients aged 21-65: Has the patient had a pap smear?  NA - based on age or sex

## 2022-04-20 NOTE — PROGRESS NOTES
Tammie Flores is a 74 y.o. female who presents to the office today for the following:    Chief Complaint   Patient presents with    Pre-op Exam    Rash       Past Medical History:   Diagnosis Date    Acute sinusitis 11/13/2020    Arthritis     COVID-19 11/22/2020    Cyst of ovary 11/13/2020    Essential (primary) hypertension 9/26/2017    Glaucoma 11/30/2018    Gout 8/9/2019    Hypercholesterolemia 11/13/2020    Impacted cerumen 11/13/2020    Menopause     Mild intermittent asthma, uncomplicated 9/23/5693    Mixed hyperlipidemia due to type 2 diabetes mellitus ([address] 9/26/2017    Painless rectal bleeding 11/30/2018    Type 2 diabetes mellitus without complications ([address] 3/88/7513       Past Surgical History:   Procedure Laterality Date    HX COLONOSCOPY      HX HERNIA REPAIR  Unknown date    HX KNEE ARTHROSCOPY Right 01/01/2013    HX OTHER SURGICAL  05/16/2017    Eye surgery procedure    HX PARTIAL HYSTERECTOMY  Unknown date        Family History   Problem Relation Age of Onset    OSTEOARTHRITIS Mother     Hypertension Other     Heart Disease Other     Diabetes Other         Social History     Tobacco Use    Smoking status: Former Smoker     Types: Cigarettes    Smokeless tobacco: Never Used    Tobacco comment: At least 30 years ago. Vaping Use    Vaping Use: Never used   Substance Use Topics    Alcohol use: Never    Drug use: Never        HPI  Patient here today for follow-up of diabetes and for preop examination with PMH of asthma, hypertension, hyperlipidemia, glaucoma, gout, type 2 diabetes, obesity and arthritis. Reports taking medicine as noted in chart. States that sugars are improved since adding the lantus and staying under 200. Does want to have area on right breast looked at. States she has a small, itchy area that she noticed in the past week. Has procedure anticipated on right shoulder with orthopedic.      Current Outpatient Medications on File Prior to Visit Medication Sig    amLODIPine (NORVASC) 10 mg tablet TAKE 1 TABLET EVERY DAY    metFORMIN (GLUCOPHAGE) 1,000 mg tablet Take 1 Tablet by mouth two (2) times daily (with meals).  insulin glargine (LANTUS,BASAGLAR) 100 unit/mL (3 mL) inpn 10 Units by SubCUTAneous route daily for 90 days.  albuterol (PROVENTIL HFA, VENTOLIN HFA, PROAIR HFA) 90 mcg/actuation inhaler Take 2 Puffs by inhalation every four (4) hours as needed for Wheezing.  Insulin Needles, Disposable, 32 gauge x \" ndle Use pen needle to inject medicine every 7 days    albuterol (PROVENTIL VENTOLIN) 2.5 mg /3 mL (0.083 %) nebu INHALE THE CONTENTS OF 1 VIAL VIA NEBULIZER FOUR TIMES DAILY AS NEEDED    allopurinoL (ZYLOPRIM) 100 mg tablet TAKE 1 TABLET EVERY DAY    Wixela Inhub 100-50 mcg/dose diskus inhaler Take 1 Puff by inhalation two (2) times a day.  timolol (TIMOPTIC) 0.5 % ophthalmic solution     BD Single Use Swabs Regular padm USE  TO TEST BLOOD SUGAR EVERY DAY    brimonidine (ALPHAGAN) 0.2 % ophthalmic solution Administer 1 Drop to left eye three (3) times daily.  dorzolamide (TRUSOPT) 2 % ophthalmic solution Administer 1 Drop to left eye three (3) times daily.  latanoprost (XALATAN) 0.005 % ophthalmic solution Administer 2 Drops to both eyes three (3) times daily.  TRUEplus Lancets 28 gauge misc USE TO CHECK BLOOD SUGAR DAILY     No current facility-administered medications on file prior to visit. Medications Ordered Today   Medications    DISCONTD: triamcinolone acetonide (KENALOG) 0.1 % ointment     Sig: Apply  to affected area two (2) times a day. use thin layer     Dispense:  30 g     Refill:  0    cloNIDine HCL (CATAPRES) 0.2 mg tablet     Sig: Take 1 Tablet by mouth three (3) times daily for 90 days. Dispense:  270 Tablet     Refill:  0    dulaglutide (Trulicity) 9.93 WP/1.1 mL sub-q pen     Si.5 mL by SubCUTAneous route every seven (7) days for 90 days.      Dispense:  6 mL     Refill:  0 Review of Systems   Constitutional: Negative. HENT: Negative. Eyes: Negative. Respiratory: Shortness of breath   Cardiovascular: Negative. Gastrointestinal: Negative. Genitourinary: Negative. Musculoskeletal: Positive for back pain, joint pain and myalgias. Negative for falls and neck pain. Skin: rash to right breast, itching  Neurological: Negative. Endo/Heme/Allergies: Negative for polydipsia. Psychiatric/Behavioral: Negative. Visit Vitals  BP (!) 142/85   Pulse 92   Temp 97.4 °F (36.3 °C) (Temporal)   Resp 18   Ht 5' 6\" (1.676 m)   Wt 254 lb (115.2 kg)   SpO2 98%   BMI 41.00 kg/m²       Physical Exam  Vitals and nursing note reviewed. Constitutional:       Appearance: Normal appearance. She is obese. Neck:      Vascular: No carotid bruit. Cardiovascular:      Rate and Rhythm: Normal rate and regular rhythm. Pulses: Normal pulses. Heart sounds: Normal heart sounds. Pulmonary:      Effort: Pulmonary effort is normal.      Breath sounds: Normal breath sounds. Chest:          Comments: Small patch of erythema  Abdominal:      General: Bowel sounds are normal.      Palpations: Abdomen is soft. Tenderness: There is no abdominal tenderness. There is no guarding. Musculoskeletal:      Right shoulder: Tenderness present. Decreased range of motion. Right knee: Normal.      Left knee: Tenderness present over the medial joint line. Right lower leg: Edema (trace) present. Left lower leg: Edema (trace) present. Lymphadenopathy:      Cervical: No cervical adenopathy. Skin:     General: Skin is warm and dry. Neurological:      Mental Status: She is alert and oriented to person, place, and time. Mental status is at baseline.       Gait: Gait abnormal.   Psychiatric:         Mood and Affect: Mood normal.         Behavior: Behavior normal.           1. Type 2 diabetes mellitus without complication, without long-term current use of insulin ([address]  Lab Results   Component Value Date/Time    Hemoglobin A1c 10.1 (H) 03/23/2021 12:29 PM    Hemoglobin A1c (POC) 7.6 04/20/2022 02:00 PM    Hemoglobin A1c, External 8.0 02/10/2020 12:00 AM   Currently on Metformin 1000 mg twice daily, glipizide 10 mg twice daily and trulicity 0.18WK every 7 days, lantus 10 units nightly  Did not bring meter but reports fasting sugars around 160-180s  States she has been trying to follow a diabetic diet along with increasing her exercise as much as possible (is limited due to pain in her left knee)     Stopped pioglitazone due to weight gain and started on trulicity  We will continue on the Metformin, trulicity and glipizide, lantus 10 units nightly  Will add Lantus 10 units nightly and reviewed potential effects and hypoglycemia sx  Continue to encourage following diabetic diet along with getting regular exercise 3-5 times weekly  Check fasting sugars and bring to next appointment  Continue regular eye exams  - AMB POC HEMOGLOBIN A1C    2. Essential (primary) hypertension  Blood pressure is fairly controlled and continue medication as directed  Will add afternoon dose of clonidine 0.2mg to make three times daily  Check readings at home and have staff contact her in 2 weeks    3. Mixed hyperlipidemia due to type 2 diabetes mellitus ([address]  Lab Results   Component Value Date/Time    LDL,Direct 90 11/23/2020 08:35 PM    LDL, calculated 110 (H) 02/21/2022 03:19 PM    LDL, calculated 101 (H) 08/10/2020 11:00 AM   Essentially stable  Continues on pravastatin as directed    4. Mild intermittent asthma, uncomplicated  Symptoms stable  On Wixela and albuterol as needed    5. Osteoarthritis involving multiple joints on both sides of body  She has pain in multiple joints including shoulders and knees  Has anticipated procedure on right shoulder and told she will knee left knee replacement  We will continue on Tylenol and topical diclofenac as needed    6.  Severe obesity ([address]  Has had weight loss since last visit around 4lb   She is continuing to work on following diabetic diet along with increasing her exercise as much as possible. 7. Chronic gout without tophus, unspecified cause, unspecified site  Stable and on allopurinol as directed    8. Glaucoma of both eyes, unspecified glaucoma type  She continues on eyedrops as directed and followed by ophthalmology    9. Pruritic rash  Will prescribe topical triamcinolone to use x 7 days  If not resolving, follow up with provider    10. Abnormal EKG  EKG shows sinus rhythm with occasional supraventricular premature complexes, non specific t-wave abnormality  Given risk factors and reports shortness of breath with activity, recommend cardiac clearance prior to surgery    11. Preop examination  Feel that she will be acceptable risk for surgery pending cardiac clearance and stable labwork    Patient verbalizes understanding of plan of care as discussed above    Follow-up and Dispositions    · Return in about 3 months (around 7/20/2022) for or sooner for worsening symptoms.

## 2022-04-21 ENCOUNTER — TELEPHONE (OUTPATIENT)
Dept: PRIMARY CARE CLINIC | Age: 72
End: 2022-04-21

## 2022-04-21 DIAGNOSIS — L28.2 PRURITIC RASH: ICD-10-CM

## 2022-04-21 RX ORDER — TRIAMCINOLONE ACETONIDE 1 MG/G
OINTMENT TOPICAL 2 TIMES DAILY
Qty: 30 G | Refills: 1 | Status: SHIPPED | OUTPATIENT
Start: 2022-04-21 | End: 2022-06-03

## 2022-04-21 NOTE — TELEPHONE ENCOUNTER
Re: Triamcinolone acetonide    Was sent to [facility]; needs to be re-sent to [facility] [place] per patient.

## 2022-04-22 ENCOUNTER — TELEPHONE (OUTPATIENT)
Dept: PRIMARY CARE CLINIC | Age: 72
End: 2022-04-22

## 2022-04-22 DIAGNOSIS — E11.9 TYPE 2 DIABETES MELLITUS WITHOUT COMPLICATION, WITHOUT LONG-TERM CURRENT USE OF INSULIN (HCC): ICD-10-CM

## 2022-04-22 RX ORDER — CALCIUM CITRATE/VITAMIN D3 200MG-6.25
TABLET ORAL DAILY
Qty: 50 STRIP | Refills: 5 | Status: SHIPPED | OUTPATIENT
Start: 2022-04-22 | End: 2022-05-13 | Stop reason: ALTCHOICE

## 2022-04-29 VITALS
WEIGHT: 254 LBS | TEMPERATURE: 97.4 F | DIASTOLIC BLOOD PRESSURE: 85 MMHG | BODY MASS INDEX: 40.82 KG/M2 | HEART RATE: 92 BPM | SYSTOLIC BLOOD PRESSURE: 142 MMHG | RESPIRATION RATE: 18 BRPM | HEIGHT: 66 IN | OXYGEN SATURATION: 98 %

## 2022-05-03 ENCOUNTER — TELEPHONE (OUTPATIENT)
Dept: PRIMARY CARE CLINIC | Age: 72
End: 2022-05-03

## 2022-05-03 NOTE — TELEPHONE ENCOUNTER
Contacted patient. Has had stress done. Scheduled for echo on 5/9. Appt with Dr. Blaze Herrera 5/20.    ----- Message from Sarah Kate NP sent at 4/29/2022  5:51 PM EDT -----  Please tell patient to let us know when she is set up with cardiology. Have cleared for procedure once this is done but did have labs ordered that theyy will likely want before surgery.

## 2022-05-13 DIAGNOSIS — E11.9 TYPE 2 DIABETES MELLITUS WITHOUT COMPLICATION, WITHOUT LONG-TERM CURRENT USE OF INSULIN (HCC): Primary | ICD-10-CM

## 2022-05-13 RX ORDER — BLOOD SUGAR DIAGNOSTIC
STRIP MISCELLANEOUS
Qty: 100 STRIP | Refills: 0 | Status: SHIPPED | OUTPATIENT
Start: 2022-05-13 | End: 2022-07-17

## 2022-05-13 RX ORDER — BLOOD-GLUCOSE METER
1 EACH MISCELLANEOUS DAILY
Qty: 1 EACH | Refills: 0 | Status: SHIPPED | OUTPATIENT
Start: 2022-05-13 | End: 2022-06-22

## 2022-05-13 RX ORDER — LANCETS
EACH MISCELLANEOUS
Qty: 100 EACH | Refills: 5 | Status: SHIPPED | OUTPATIENT
Start: 2022-05-13 | End: 2022-06-22

## 2022-05-19 ENCOUNTER — TELEPHONE (OUTPATIENT)
Dept: PRIMARY CARE CLINIC | Age: 72
End: 2022-05-19

## 2022-05-19 DIAGNOSIS — E11.9 TYPE 2 DIABETES MELLITUS WITHOUT COMPLICATION, WITHOUT LONG-TERM CURRENT USE OF INSULIN (HCC): Primary | ICD-10-CM

## 2022-05-19 RX ORDER — ISOPROPYL ALCOHOL 70 ML/100ML
SWAB TOPICAL
Qty: 100 PAD | Refills: 1 | Status: SHIPPED | OUTPATIENT
Start: 2022-05-19 | End: 2022-10-09

## 2022-05-26 DIAGNOSIS — M1A.9XX0 CHRONIC GOUT WITHOUT TOPHUS, UNSPECIFIED CAUSE, UNSPECIFIED SITE: Primary | ICD-10-CM

## 2022-05-26 RX ORDER — ALLOPURINOL 100 MG/1
TABLET ORAL
Qty: 90 TABLET | Refills: 1 | Status: SHIPPED | OUTPATIENT
Start: 2022-05-26

## 2022-06-03 DIAGNOSIS — L28.2 PRURITIC RASH: ICD-10-CM

## 2022-06-03 RX ORDER — TRIAMCINOLONE ACETONIDE 1 MG/G
OINTMENT TOPICAL 2 TIMES DAILY
Qty: 30 G | Refills: 1 | Status: SHIPPED | OUTPATIENT
Start: 2022-06-03

## 2022-06-10 ENCOUNTER — TELEPHONE (OUTPATIENT)
Dept: PRIMARY CARE CLINIC | Age: 72
End: 2022-06-10

## 2022-06-18 DIAGNOSIS — E11.9 TYPE 2 DIABETES MELLITUS WITHOUT COMPLICATION, WITHOUT LONG-TERM CURRENT USE OF INSULIN (HCC): ICD-10-CM

## 2022-06-19 RX ORDER — METFORMIN HYDROCHLORIDE 1000 MG/1
TABLET ORAL
Qty: 180 TABLET | Refills: 1 | Status: SHIPPED | OUTPATIENT
Start: 2022-06-19

## 2022-06-20 DIAGNOSIS — E11.9 TYPE 2 DIABETES MELLITUS WITHOUT COMPLICATION, WITHOUT LONG-TERM CURRENT USE OF INSULIN (HCC): ICD-10-CM

## 2022-06-20 RX ORDER — DULAGLUTIDE 0.75 MG/.5ML
INJECTION, SOLUTION SUBCUTANEOUS
Qty: 12 ML | Refills: 0 | Status: SHIPPED | OUTPATIENT
Start: 2022-06-20

## 2022-06-21 ENCOUNTER — TELEPHONE (OUTPATIENT)
Dept: PRIMARY CARE CLINIC | Age: 72
End: 2022-06-21

## 2022-06-21 NOTE — TELEPHONE ENCOUNTER
[employer] Patient Assistance Forms mailed to [facility]. [address] [place] [address] (Original Forms).

## 2022-06-22 ENCOUNTER — OFFICE VISIT (OUTPATIENT)
Dept: ORTHOPEDIC SURGERY | Age: 72
End: 2022-06-22
Payer: MEDICARE

## 2022-06-22 VITALS — HEIGHT: 66 IN | BODY MASS INDEX: 39.05 KG/M2 | WEIGHT: 243 LBS

## 2022-06-22 DIAGNOSIS — M17.11 PRIMARY OSTEOARTHRITIS OF RIGHT KNEE: ICD-10-CM

## 2022-06-22 DIAGNOSIS — M25.561 RIGHT KNEE PAIN, UNSPECIFIED CHRONICITY: ICD-10-CM

## 2022-06-22 DIAGNOSIS — M25.511 RIGHT SHOULDER PAIN, UNSPECIFIED CHRONICITY: Primary | ICD-10-CM

## 2022-06-22 PROCEDURE — 99203 OFFICE O/P NEW LOW 30 MIN: CPT | Performed by: ORTHOPAEDIC SURGERY

## 2022-06-22 PROCEDURE — G8510 SCR DEP NEG, NO PLAN REQD: HCPCS | Performed by: ORTHOPAEDIC SURGERY

## 2022-06-22 PROCEDURE — G8756 NO BP MEASURE DOC: HCPCS | Performed by: ORTHOPAEDIC SURGERY

## 2022-06-22 PROCEDURE — 20611 DRAIN/INJ JOINT/BURSA W/US: CPT | Performed by: ORTHOPAEDIC SURGERY

## 2022-06-22 PROCEDURE — 1090F PRES/ABSN URINE INCON ASSESS: CPT | Performed by: ORTHOPAEDIC SURGERY

## 2022-06-22 PROCEDURE — G8399 PT W/DXA RESULTS DOCUMENT: HCPCS | Performed by: ORTHOPAEDIC SURGERY

## 2022-06-22 PROCEDURE — 3017F COLORECTAL CA SCREEN DOC REV: CPT | Performed by: ORTHOPAEDIC SURGERY

## 2022-06-22 PROCEDURE — 1101F PT FALLS ASSESS-DOCD LE1/YR: CPT | Performed by: ORTHOPAEDIC SURGERY

## 2022-06-22 PROCEDURE — G8427 DOCREV CUR MEDS BY ELIG CLIN: HCPCS | Performed by: ORTHOPAEDIC SURGERY

## 2022-06-22 PROCEDURE — G9899 SCRN MAM PERF RSLTS DOC: HCPCS | Performed by: ORTHOPAEDIC SURGERY

## 2022-06-22 PROCEDURE — 1123F ACP DISCUSS/DSCN MKR DOCD: CPT | Performed by: ORTHOPAEDIC SURGERY

## 2022-06-22 PROCEDURE — G8417 CALC BMI ABV UP PARAM F/U: HCPCS | Performed by: ORTHOPAEDIC SURGERY

## 2022-06-22 PROCEDURE — G8536 NO DOC ELDER MAL SCRN: HCPCS | Performed by: ORTHOPAEDIC SURGERY

## 2022-06-22 RX ORDER — CARVEDILOL 12.5 MG/1
TABLET ORAL
COMMUNITY
Start: 2022-05-21

## 2022-06-22 RX ORDER — LIDOCAINE HYDROCHLORIDE 10 MG/ML
9 INJECTION INFILTRATION; PERINEURAL ONCE
Status: COMPLETED | OUTPATIENT
Start: 2022-06-22 | End: 2022-06-22

## 2022-06-22 RX ORDER — LANCETS 33 GAUGE
EACH MISCELLANEOUS
COMMUNITY
Start: 2022-05-16

## 2022-06-22 RX ORDER — TRIAMCINOLONE ACETONIDE 40 MG/ML
40 INJECTION, SUSPENSION INTRA-ARTICULAR; INTRAMUSCULAR ONCE
Status: COMPLETED | OUTPATIENT
Start: 2022-06-22 | End: 2022-06-22

## 2022-06-22 RX ORDER — BLOOD-GLUCOSE METER
EACH MISCELLANEOUS
COMMUNITY
Start: 2022-05-16

## 2022-06-22 RX ADMIN — LIDOCAINE HYDROCHLORIDE 9 ML: 10 INJECTION INFILTRATION; PERINEURAL at 14:20

## 2022-06-22 RX ADMIN — TRIAMCINOLONE ACETONIDE 40 MG: 40 INJECTION, SUSPENSION INTRA-ARTICULAR; INTRAMUSCULAR at 14:20

## 2022-06-22 NOTE — PROGRESS NOTES
Name: María Elena Lerner    : 1950     Service Dept: [address] [place] [address] and Sports Medicine    Chief Complaint   Patient presents with    Knee Pain    Shoulder Pain        Visit Vitals  Ht 5' 6\" (1.676 m)   Wt 243 lb (110.2 kg)   BMI 39.22 kg/m²        Allergies   Allergen Reactions    Aspirin Shortness of Breath     Respiratory distress    Codeine Vertigo        Current Outpatient Medications   Medication Sig Dispense Refill    carvediloL (COREG) 12.5 mg tablet TAKE 1 TABLET BY MOUTH TWICE DAILY WITH FOOD      True Metrix Glucose Meter monitoring kit       TRUEplus Lancets 33 gauge misc       Trulicity 3.30 GF/3.2 mL sub-q pen INJECT THE CONTENTS OF 1 PEN (0.5 ML) UNDER THE SKIN ONCE WEEKLY. 12 mL 0    metFORMIN (GLUCOPHAGE) 1,000 mg tablet TAKE 1 TABLET TWICE DAILY WITH MEALS 180 Tablet 1    triamcinolone acetonide (KENALOG) 0.1 % ointment APPLY  TO AFFECTED AREA TWO (2) TIMES A DAY. USE THIN LAYER 30 g 1    allopurinoL (ZYLOPRIM) 100 mg tablet TAKE 1 TABLET EVERY DAY 90 Tablet 1    alcohol swabs (BD Single Use Swabs Regular) padm USE  TO TEST BLOOD SUGAR EVERY  Pad 1    glucose blood VI test strips (Accu-Chek Aubrie Plus test strp) strip USE ONE STRIP TO CHECK GLUCOSE TWICE A  Strip 0    lisinopriL (PRINIVIL, ZESTRIL) 20 mg tablet TAKE 1 TABLET EVERY DAY 90 Tablet 0    pravastatin (PRAVACHOL) 40 mg tablet TAKE 1 TABLET EVERY DAY 90 Tablet 0    glipiZIDE (GLUCOTROL) 5 mg tablet TAKE 2 TABLETS TWICE DAILY 360 Tablet 0    hydroCHLOROthiazide (HYDRODIURIL) 25 mg tablet TAKE 1 TABLET EVERY DAY 90 Tablet 0    atenoloL (TENORMIN) 50 mg tablet TAKE 1 TABLET EVERY DAY 90 Tablet 0    cloNIDine HCL (CATAPRES) 0.2 mg tablet Take 1 Tablet by mouth three (3) times daily for 90 days.  270 Tablet 0    amLODIPine (NORVASC) 10 mg tablet TAKE 1 TABLET EVERY DAY 90 Tablet 1    albuterol (PROVENTIL HFA, VENTOLIN HFA, PROAIR HFA) 90 mcg/actuation inhaler Take 2 Puffs by inhalation every four (4) hours as needed for Wheezing.  Insulin Needles, Disposable, 32 gauge x 5/32\" ndle Use pen needle to inject medicine every 7 days 50 Pen Needle 2    Wixela Inhub 100-50 mcg/dose diskus inhaler Take 1 Puff by inhalation two (2) times a day.  timolol (TIMOPTIC) 0.5 % ophthalmic solution       brimonidine (ALPHAGAN) 0.2 % ophthalmic solution Administer 1 Drop to left eye three (3) times daily.  dorzolamide (TRUSOPT) 2 % ophthalmic solution Administer 1 Drop to left eye three (3) times daily.  latanoprost (XALATAN) 0.005 % ophthalmic solution Administer 2 Drops to both eyes three (3) times daily. Current Facility-Administered Medications   Medication Dose Route Frequency Provider Last Rate Last Admin    lidocaine (XYLOCAINE) 10 mg/mL (1 %) injection 9 mL  9 mL Other ONCE Surya Mooney MD        triamcinolone acetonide (KENALOG-40) 40 mg/mL injection 40 mg  40 mg Intra artICUlar ONCE Marvina Moritz, MD          Patient Active Problem List   Diagnosis Code    Mild intermittent asthma, uncomplicated E41.68    Essential (primary) hypertension I10    Glaucoma H40.9    Gout M10.9    Mixed hyperlipidemia due to type 2 diabetes mellitus (HCC) E11.69, E78.2    Type 2 diabetes mellitus without complications (HCC) Q71.6    Severe obesity (HCC) E66.01    History of total knee replacement Z96.659    Osteoarthritis involving multiple joints on both sides of body M15.9    Asthma J45.909    Hypercholesterolemia E78.00      Family History   Problem Relation Age of Onset    OSTEOARTHRITIS Mother     Hypertension Other     Heart Disease Other     Diabetes Other       Social History     Socioeconomic History    Marital status:    Tobacco Use    Smoking status: Former Smoker     Types: Cigarettes    Smokeless tobacco: Never Used    Tobacco comment: At least 30 years ago.    Vaping Use    Vaping Use: Never used   Substance and Sexual Activity    Alcohol use: Never    Drug use: Never      Past Surgical History:   Procedure Laterality Date    HX COLONOSCOPY      HX HERNIA REPAIR  Unknown date    HX KNEE ARTHROSCOPY Right 01/01/2013    HX OTHER SURGICAL  05/16/2017    Eye surgery procedure    HX PARTIAL HYSTERECTOMY  Unknown date      Past Medical History:   Diagnosis Date    Acute sinusitis 11/13/2020    Arthritis     COVID-19 11/22/2020    Cyst of ovary 11/13/2020    Essential (primary) hypertension 9/26/2017    Glaucoma 11/30/2018    Gout 8/9/2019    Hypercholesterolemia 11/13/2020    Impacted cerumen 11/13/2020    Menopause     Mild intermittent asthma, uncomplicated 7/22/1582    Mixed hyperlipidemia due to type 2 diabetes mellitus ([facility] 9/26/2017    Painless rectal bleeding 11/30/2018    Type 2 diabetes mellitus without complications ([facility] 4/76/5087        I have reviewed and agree with [address] [place] [address] Nw and ROS and intake form in chart and the record furthermore I have reviewed prior medical record(s) regarding this patients care during this appointment. Review of Systems:   Patient is a pleasant appearing individual, appropriately dressed, well hydrated, well nourished, who is alert, appropriately oriented for age, and in no acute distress with a normal gait and normal affect who does not appear to be in any significant pain. Physical Exam:  Right Shoulder - Grossly neurovascularly intact. Range of motion-Decreased actively and passively. No Point tenderness, Strength-weakness with abduction, positive crepitation, No skin lesion are identified, No instabilty is noted, Positive apprehension, No Swelling. Left Shoulder - Grossly neurovascularly intact, Full Range of motion, No point tenderness, No weakness, No skin lesions, No Instability, No apprehension, No swelling.     Right Knee -Decrease range of motion with flexion, Knee arc of greater than 50 degrees, Some crepitation, Grossly neurovascularly intact, Good cap refill, No skin lesion, Moderate swelling, No gross instability, Some quadriceps weakness, Kellgren and Darrin at least grade 3    Left Knee - Full Range of Motion, No crepitation, Grossly neurovascularly intact, Good cap refill, No skin lesion, No swelling, No gross instability, No quadriceps weakness    Procedure Documentation:    I discussed in detail the risks, benefits and complications of an injection which included but are not limited to infection, skin reactions, hot swollen joint, and anaphylaxis with the patient. The patient verbalized understanding and gave informed consent for the injection. The patient's knee was flexed to 90° and the skin prepped using sterile alcohol solution. A sterile needle was inserted into the right knee and the mixture of 9 mL Lidocaine 1%, 1 mL Kenalog 40 mg was injected under sterile technique. The needle was withdrawn and the puncture site sealed with a Band-Aid. Technique: Under sterile conditions a wedgies ultrasound unit with a variable frequency (7.0-14.0 MHz) linear transducer was used to localize the placement of needle into the right knee joint. Findings: Successful needle placement for knee injection. Final images were taken and saved for permanent record. The patient tolerated the injection well. The patient was instructed to call the office immediately if there is any pain, redness, warmth, fever, or chills. Encounter Diagnoses     ICD-10-CM ICD-9-CM   1. Right shoulder pain, unspecified chronicity  M25.511 719.41   2. Right knee pain, unspecified chronicity  M25.561 719.46   3. Primary osteoarthritis of right knee  M17.11 715.16       HPI:  The patient is here with a chief complaint of right shoulder and right knee pain, throbbing, burning pain. Pain is 10/10. Continues to have difficulty with glenohumeral arthritis and of the knee. X-rays are positive for OA. Assessment/Plan:  Plan at this point cortisone injection, right knee.   For the right shoulder I would recommend MRI to assess the rotator cuff and she does have glenohumeral arthritis. As part of continued conservative pain management options the patient was advised to utilize Tylenol or OTC NSAIDS as long as it is not medically contraindicated. Return to Office:    post mri     Scribed by Aniceto Solis as dictated by Sanam Crouch. Magalys Gross MD.  Documentation True and Accepted Surya Gross MD

## 2022-06-22 NOTE — LETTER
Raymon Waddell   1950   [id number]       6/22/2022       I hereby authorize and direct Surya Mckeon MD, Isma Robledo, and whomever he may designate as his associate to perform upon myself the following procedure:    Injection of: Kenalog, rtknee rm 2    If any unforeseen condition arises in the course of the procedure, I further authorize him and his associated and/or assistant(s) to do whatever he/she deems advisable. The nature, purpose, benefits, risks, side effects, likelihood of achieving goals, and potential problems that might occur during recuperation, risks for not receiving the proposed care, treatment and services and alternatives of the procedure have been fully explained to me by my physician including, but not limited to:    Swelling, joint pain, skin pigment changes, worsening of condition, and failure to improve. I acknowledge that no guarantee or assurance has been made to me as to the results that may be obtained or the likelihood of success.                 _______________________________________     Signature of patient or authorized representative                [employer] and Sports Medicine fax: [phone]

## 2022-06-22 NOTE — LETTER
6/24/2022    Patient: Pop Tijerina   YOB: 1950   Date of Visit: 6/22/2022     Ginny Granados NP  [address] [facility]  Via In Basket    Dear Ginny Granados NP,      Thank you for referring Ms. Júnior Serrano to [address] [place] [address] AND SPORTS MEDICINE for evaluation. My notes for this consultation are attached. If you have questions, please do not hesitate to call me. I look forward to following your patient along with you.       Sincerely,    Cait Munoz MD

## 2022-06-22 NOTE — PATIENT INSTRUCTIONS
Knee Arthritis: Care Instructions  Your Care Instructions     Knee arthritis is a breakdown of the cartilage that cushions your knee joint. When the cartilage wears down, your bones rub against each other. This causes pain and stiffness. Knee arthritis tends to get worse with time. Treatment for knee arthritis involves reducing pain, making the leg muscles stronger, and staying at a healthy body weight. The treatment usually does not improve the health of the cartilage, but it can reduce pain and improve how well your knee works. You can take simple measures to protect your knee joints, ease your pain, and help you stay active. Follow-up care is a key part of your treatment and safety. Be sure to make and go to all appointments, and call your doctor if you are having problems. It's also a good idea to know your test results and keep a list of the medicines you take. How can you care for yourself at home? · Know that knee arthritis will cause more pain on some days than on others. · Stay at a healthy weight. Lose weight if you are overweight. When you stand up, the pressure on your knees from every pound of body weight is multiplied four times. So if you lose 10 pounds, you will reduce the pressure on your knees by 40 pounds. · Talk to your doctor or physical therapist about exercises that will help ease joint pain. ? Stretch to help prevent stiffness and to prevent injury before you exercise. You may enjoy gentle forms of yoga to help keep your knee joints and muscles flexible. ? Walk instead of jog.  ? Ride a bike. This makes your thigh muscles stronger and takes pressure off your knee. ? Wear well-fitting and comfortable shoes. ? Exercise in chest-deep water. This can help you exercise longer with less pain. ? Avoid exercises that include squatting or kneeling. They can put a lot of strain on your knees.   ? Talk to your doctor to make sure that the exercise you do is not making the arthritis worse.  · Do not sit for long periods of time. Try to walk once in a while to keep your knee from getting stiff. · Ask your doctor or physical therapist whether shoe inserts may reduce your arthritis pain. · If you can afford it, get new athletic shoes at least every year. This can help reduce the strain on your knees. · Use a device to help you do everyday activities. ? A cane or walking stick can help you keep your balance when you walk. Hold the cane or walking stick in the hand opposite the painful knee. ? If you feel like you may fall when you walk, try using crutches or a front-wheeled walker. These can prevent falls that could cause more damage to your knee. ? A knee brace may help keep your knee stable and prevent pain. ? You also can use other things to make life easier, such as a higher toilet seat and handrails in the bathtub or shower. · Take pain medicines exactly as directed. ? Do not wait until you are in severe pain. You will get better results if you take it sooner. ? If you are not taking a prescription pain medicine, take an over-the-counter medicine such as acetaminophen (Tylenol), ibuprofen (Advil, Motrin), or naproxen (Aleve). Read and follow all instructions on the label. ? Do not take two or more pain medicines at the same time unless the doctor told you to. Many pain medicines have acetaminophen, which is Tylenol. Too much acetaminophen (Tylenol) can be harmful. ? Tell your doctor if you take a blood thinner, have diabetes, or have allergies to shellfish. · Ask your doctor if you might benefit from a shot of steroid medicine into your knee. This may provide pain relief for several months. · Many people take the supplements glucosamine and chondroitin for osteoarthritis. Some people feel they help, but the medical research does not show that they work. Talk to your doctor before you take these supplements. When should you call for help?    Call your doctor now or seek immediate medical care if:    · You have sudden swelling, warmth, or pain in your knee.     · You have knee pain and a fever or rash.     · You have such bad pain that you cannot use your knee. Watch closely for changes in your health, and be sure to contact your doctor if you have any problems. Where can you learn more? Go to http://www.gray.com/  Enter W187 in the search box to learn more about \"Knee Arthritis: Care Instructions. \"  Current as of: December 20, 2021               Content Version: 13.2  © 3494-0199 [employer]. Care instructions adapted under license by [employer] (which disclaims liability or warranty for this information). If you have questions about a medical condition or this instruction, always ask your healthcare professional. [address] any warranty or liability for your use of this information. Shoulder Pain: Care Instructions  Your Care Instructions     You can hurt your shoulder by using it too much during an activity, such as fishing or baseball. It can also happen as part of the everyday wear and tear of getting older. Shoulder injuries can be slow to heal, but your shoulder should get better with time. Your doctor may recommend a sling to rest your shoulder. If you have injured your shoulder, you may need testing and treatment. Follow-up care is a key part of your treatment and safety. Be sure to make and go to all appointments, and call your doctor if you are having problems. It's also a good idea to know your test results and keep a list of the medicines you take. How can you care for yourself at home? · Take pain medicines exactly as directed. ? If the doctor gave you a prescription medicine for pain, take it as prescribed. ? If you are not taking a prescription pain medicine, ask your doctor if you can take an over-the-counter medicine.   ? Do not take two or more pain medicines at the same time unless the doctor told you to. Many pain medicines contain acetaminophen, which is Tylenol. Too much acetaminophen (Tylenol) can be harmful. · If your doctor recommends that you wear a sling, use it as directed. Do not take it off before your doctor tells you to. · Put ice or a cold pack on the sore area for 10 to 20 minutes at a time. Put a thin cloth between the ice and your skin. · If there is no swelling, you can put moist heat, a heating pad, or a warm cloth on your shoulder. Some doctors suggest alternating between hot and cold. · Rest your shoulder for a few days. If your doctor recommends it, you can then begin gentle exercise of the shoulder, but do not lift anything heavy. When should you call for help? Call 911 anytime you think you may need emergency care. For example, call if:    · You have chest pain or pressure. This may occur with:  ? Sweating. ? Shortness of breath. ? Nausea or vomiting. ? Pain that spreads from the chest to the neck, jaw, or one or both shoulders or arms. ? Dizziness or lightheadedness. ? A fast or uneven pulse. After calling 911, chew 1 adult-strength aspirin. Wait for an ambulance. Do not try to drive yourself.     · Your arm or hand is cool or pale or changes color. Call your doctor now or seek immediate medical care if:    · You have signs of infection, such as:  ? Increased pain, swelling, warmth, or redness in your shoulder. ? Red streaks leading from a place on your shoulder. ? Pus draining from an area of your shoulder. ? Swollen lymph nodes in your neck, armpits, or groin. ? A fever. Watch closely for changes in your health, and be sure to contact your doctor if:    · You cannot use your shoulder.     · Your shoulder does not get better as expected. Where can you learn more? Go to http://www.[employer].com/  Enter H996 in the search box to learn more about \"Shoulder Pain: Care Instructions. \"  Current as of: July 1, 2021               Content Version: 13.2  © 9783-4731 [employer]. Care instructions adapted under license by [employer] (which disclaims liability or warranty for this information). If you have questions about a medical condition or this instruction, always ask your healthcare professional. [address] any warranty or liability for your use of this information.

## 2022-06-23 LAB
ALBUMIN SERPL-MCNC: 4.3 G/DL (ref 3.7–4.7)
ALBUMIN/GLOB SERPL: 1.4 {RATIO} (ref 1.2–2.2)
ALP SERPL-CCNC: 76 IU/L (ref 44–121)
ALT SERPL-CCNC: 12 IU/L (ref 0–32)
APPEARANCE UR: CLEAR
AST SERPL-CCNC: 13 IU/L (ref 0–40)
BACTERIA #/AREA URNS HPF: ABNORMAL /[HPF]
BASOPHILS # BLD AUTO: 0 X10E3/UL (ref 0–0.2)
BASOPHILS NFR BLD AUTO: 0 %
BILIRUB SERPL-MCNC: <0.2 MG/DL (ref 0–1.2)
BILIRUB UR QL STRIP: NEGATIVE
BUN SERPL-MCNC: 8 MG/DL (ref 8–27)
BUN/CREAT SERPL: 10 (ref 12–28)
CALCIUM SERPL-MCNC: 10.3 MG/DL (ref 8.7–10.3)
CASTS URNS QL MICRO: ABNORMAL /LPF
CHLORIDE SERPL-SCNC: 100 MMOL/L (ref 96–106)
CO2 SERPL-SCNC: 27 MMOL/L (ref 20–29)
COLOR UR: YELLOW
CREAT SERPL-MCNC: 0.78 MG/DL (ref 0.57–1)
EGFR: 80 ML/MIN/1.73
EOSINOPHIL # BLD AUTO: 0.2 X10E3/UL (ref 0–0.4)
EOSINOPHIL NFR BLD AUTO: 3 %
EPI CELLS #/AREA URNS HPF: ABNORMAL /HPF (ref 0–10)
ERYTHROCYTE [DISTWIDTH] IN BLOOD BY AUTOMATED COUNT: 13.7 % (ref 11.7–15.4)
GLOBULIN SER CALC-MCNC: 3 G/DL (ref 1.5–4.5)
GLUCOSE SERPL-MCNC: 123 MG/DL (ref 65–99)
GLUCOSE UR QL STRIP: NEGATIVE
HCT VFR BLD AUTO: 35.4 % (ref 34–46.6)
HGB BLD-MCNC: 11.6 G/DL (ref 11.1–15.9)
HGB UR QL STRIP: ABNORMAL
IMM GRANULOCYTES # BLD AUTO: 0 X10E3/UL (ref 0–0.1)
IMM GRANULOCYTES NFR BLD AUTO: 0 %
INR PPP: 1 (ref 0.9–1.2)
KETONES UR QL STRIP: NEGATIVE
LEUKOCYTE ESTERASE UR QL STRIP: ABNORMAL
LYMPHOCYTES # BLD AUTO: 2 X10E3/UL (ref 0.7–3.1)
LYMPHOCYTES NFR BLD AUTO: 28 %
MCH RBC QN AUTO: 27.9 PG (ref 26.6–33)
MCHC RBC AUTO-ENTMCNC: 32.8 G/DL (ref 31.5–35.7)
MCV RBC AUTO: 85 FL (ref 79–97)
MICRO URNS: ABNORMAL
MONOCYTES # BLD AUTO: 0.7 X10E3/UL (ref 0.1–0.9)
MONOCYTES NFR BLD AUTO: 10 %
NEUTROPHILS # BLD AUTO: 4.1 X10E3/UL (ref 1.4–7)
NEUTROPHILS NFR BLD AUTO: 59 %
NITRITE UR QL STRIP: NEGATIVE
PH UR STRIP: 6 [PH] (ref 5–7.5)
PLATELET # BLD AUTO: 313 X10E3/UL (ref 150–450)
POTASSIUM SERPL-SCNC: 4 MMOL/L (ref 3.5–5.2)
PROT SERPL-MCNC: 7.3 G/DL (ref 6–8.5)
PROT UR QL STRIP: ABNORMAL
PROTHROMBIN TIME: 10.9 SEC (ref 9.1–12)
RBC # BLD AUTO: 4.16 X10E6/UL (ref 3.77–5.28)
RBC #/AREA URNS HPF: ABNORMAL /HPF (ref 0–2)
SODIUM SERPL-SCNC: 143 MMOL/L (ref 134–144)
SP GR UR STRIP: 1.02 (ref 1–1.03)
UROBILINOGEN UR STRIP-MCNC: 0.2 MG/DL (ref 0.2–1)
WBC # BLD AUTO: 7.1 X10E3/UL (ref 3.4–10.8)
WBC #/AREA URNS HPF: ABNORMAL /HPF (ref 0–5)

## 2022-06-27 DIAGNOSIS — R31.9 URINARY TRACT INFECTION WITH HEMATURIA, SITE UNSPECIFIED: Primary | ICD-10-CM

## 2022-06-27 DIAGNOSIS — N39.0 URINARY TRACT INFECTION WITH HEMATURIA, SITE UNSPECIFIED: Primary | ICD-10-CM

## 2022-06-27 RX ORDER — NITROFURANTOIN 25; 75 MG/1; MG/1
100 CAPSULE ORAL 2 TIMES DAILY
Qty: 10 CAPSULE | Refills: 0 | Status: SHIPPED | OUTPATIENT
Start: 2022-06-27 | End: 2022-07-02

## 2022-06-28 NOTE — PROGRESS NOTES
Informed patient of lab results and recommendations per KAYLAH Mera NP. Patient stated understanding and had no questions at this time.

## 2022-06-28 NOTE — PROGRESS NOTES
Please let patient know that urine was positive for blood, WBC and protein. Recommend treatment with antibiotic and repeat UA once completes. Otherwise labs are stable. If any questions, let me know.

## 2022-06-29 ENCOUNTER — HOSPITAL ENCOUNTER (OUTPATIENT)
Dept: MRI IMAGING | Age: 72
Discharge: HOME | End: 2022-06-29
Payer: MEDICARE

## 2022-06-29 DIAGNOSIS — M25.511 RIGHT SHOULDER PAIN, UNSPECIFIED CHRONICITY: ICD-10-CM

## 2022-06-29 PROCEDURE — 73221 MRI JOINT UPR EXTREM W/O DYE: CPT

## 2022-07-13 ENCOUNTER — OFFICE VISIT (OUTPATIENT)
Dept: ORTHOPEDIC SURGERY | Age: 72
End: 2022-07-13
Payer: MEDICARE

## 2022-07-13 DIAGNOSIS — M25.511 RIGHT SHOULDER PAIN, UNSPECIFIED CHRONICITY: Primary | ICD-10-CM

## 2022-07-13 DIAGNOSIS — M25.511 RIGHT SHOULDER PAIN, UNSPECIFIED CHRONICITY: ICD-10-CM

## 2022-07-13 DIAGNOSIS — M19.011 ARTHRITIS OF RIGHT SHOULDER REGION: ICD-10-CM

## 2022-07-13 PROCEDURE — 99024 POSTOP FOLLOW-UP VISIT: CPT | Performed by: ORTHOPAEDIC SURGERY

## 2022-07-13 NOTE — PATIENT INSTRUCTIONS
Shoulder Pain: Care Instructions  Your Care Instructions     You can hurt your shoulder by using it too much during an activity, such as fishing or baseball. It can also happen as part of the everyday wear and tear of getting older. Shoulder injuries can be slow to heal, but your shoulder should get better with time. Your doctor may recommend a sling to rest your shoulder. If you have injured your shoulder, you may need testing and treatment. Follow-up care is a key part of your treatment and safety. Be sure to make and go to all appointments, and call your doctor if you are having problems. It's also a good idea to know your test results and keep a list of the medicines you take. How can you care for yourself at home? · Take pain medicines exactly as directed. ? If the doctor gave you a prescription medicine for pain, take it as prescribed. ? If you are not taking a prescription pain medicine, ask your doctor if you can take an over-the-counter medicine. ? Do not take two or more pain medicines at the same time unless the doctor told you to. Many pain medicines contain acetaminophen, which is Tylenol. Too much acetaminophen (Tylenol) can be harmful. · If your doctor recommends that you wear a sling, use it as directed. Do not take it off before your doctor tells you to. · Put ice or a cold pack on the sore area for 10 to 20 minutes at a time. Put a thin cloth between the ice and your skin. · If there is no swelling, you can put moist heat, a heating pad, or a warm cloth on your shoulder. Some doctors suggest alternating between hot and cold. · Rest your shoulder for a few days. If your doctor recommends it, you can then begin gentle exercise of the shoulder, but do not lift anything heavy. When should you call for help? Call 911 anytime you think you may need emergency care. For example, call if:    · You have chest pain or pressure. This may occur with:  ? Sweating. ?  Shortness of breath. ? Nausea or vomiting. ? Pain that spreads from the chest to the neck, jaw, or one or both shoulders or arms. ? Dizziness or lightheadedness. ? A fast or uneven pulse. After calling 911, chew 1 adult-strength aspirin. Wait for an ambulance. Do not try to drive yourself.     · Your arm or hand is cool or pale or changes color. Call your doctor now or seek immediate medical care if:    · You have signs of infection, such as:  ? Increased pain, swelling, warmth, or redness in your shoulder. ? Red streaks leading from a place on your shoulder. ? Pus draining from an area of your shoulder. ? Swollen lymph nodes in your neck, armpits, or groin. ? A fever. Watch closely for changes in your health, and be sure to contact your doctor if:    · You cannot use your shoulder.     · Your shoulder does not get better as expected. Where can you learn more? Go to http://www.bill.com/  Enter H996 in the search box to learn more about \"Shoulder Pain: Care Instructions. \"  Current as of: July 1, 2021               Content Version: 13.2  © 9914-6515 [employer]. Care instructions adapted under license by [employer] (which disclaims liability or warranty for this information). If you have questions about a medical condition or this instruction, always ask your healthcare professional. [place] [address] any warranty or liability for your use of this information.

## 2022-07-13 NOTE — PROGRESS NOTES
Name: Melissa Mendoza    : 1950     Service Dept: [address] [place] [address] and Sports Medicine    Chief Complaint   Patient presents with    Shoulder Pain        There were no vitals taken for this visit. Allergies   Allergen Reactions    Aspirin Shortness of Breath     Respiratory distress    Codeine Vertigo        Current Outpatient Medications   Medication Sig Dispense Refill    carvediloL (COREG) 12.5 mg tablet TAKE 1 TABLET BY MOUTH TWICE DAILY WITH FOOD      True Metrix Glucose Meter monitoring kit       TRUEplus Lancets 33 gauge misc       Trulicity 4.22 OQ/2.7 mL sub-q pen INJECT THE CONTENTS OF 1 PEN (0.5 ML) UNDER THE SKIN ONCE WEEKLY. 12 mL 0    metFORMIN (GLUCOPHAGE) 1,000 mg tablet TAKE 1 TABLET TWICE DAILY WITH MEALS 180 Tablet 1    triamcinolone acetonide (KENALOG) 0.1 % ointment APPLY  TO AFFECTED AREA TWO (2) TIMES A DAY. USE THIN LAYER 30 g 1    allopurinoL (ZYLOPRIM) 100 mg tablet TAKE 1 TABLET EVERY DAY 90 Tablet 1    alcohol swabs (BD Single Use Swabs Regular) padm USE  TO TEST BLOOD SUGAR EVERY  Pad 1    glucose blood VI test strips (Accu-Chek Aubrie Plus test strp) strip USE ONE STRIP TO CHECK GLUCOSE TWICE A  Strip 0    lisinopriL (PRINIVIL, ZESTRIL) 20 mg tablet TAKE 1 TABLET EVERY DAY 90 Tablet 0    pravastatin (PRAVACHOL) 40 mg tablet TAKE 1 TABLET EVERY DAY 90 Tablet 0    glipiZIDE (GLUCOTROL) 5 mg tablet TAKE 2 TABLETS TWICE DAILY 360 Tablet 0    hydroCHLOROthiazide (HYDRODIURIL) 25 mg tablet TAKE 1 TABLET EVERY DAY 90 Tablet 0    atenoloL (TENORMIN) 50 mg tablet TAKE 1 TABLET EVERY DAY 90 Tablet 0    cloNIDine HCL (CATAPRES) 0.2 mg tablet Take 1 Tablet by mouth three (3) times daily for 90 days. 270 Tablet 0    amLODIPine (NORVASC) 10 mg tablet TAKE 1 TABLET EVERY DAY 90 Tablet 1    albuterol (PROVENTIL HFA, VENTOLIN HFA, PROAIR HFA) 90 mcg/actuation inhaler Take 2 Puffs by inhalation every four (4) hours as needed for Wheezing.  Insulin Needles, Disposable, 32 gauge x 5/32\" ndle Use pen needle to inject medicine every 7 days 50 Pen Needle 2    Wixela Inhub 100-50 mcg/dose diskus inhaler Take 1 Puff by inhalation two (2) times a day.  timolol (TIMOPTIC) 0.5 % ophthalmic solution       brimonidine (ALPHAGAN) 0.2 % ophthalmic solution Administer 1 Drop to left eye three (3) times daily.  dorzolamide (TRUSOPT) 2 % ophthalmic solution Administer 1 Drop to left eye three (3) times daily.  latanoprost (XALATAN) 0.005 % ophthalmic solution Administer 2 Drops to both eyes three (3) times daily. Patient Active Problem List   Diagnosis Code    Mild intermittent asthma, uncomplicated Y00.24    Essential (primary) hypertension I10    Glaucoma H40.9    Gout M10.9    Mixed hyperlipidemia due to type 2 diabetes mellitus ([address] E11.69, E78.2    Type 2 diabetes mellitus without complications ([address] G22.7    Severe obesity ([address] E66.01    History of total knee replacement Z96.659    Osteoarthritis involving multiple joints on both sides of body M15.9    Asthma J45.909    Hypercholesterolemia E78.00      Family History   Problem Relation Age of Onset    OSTEOARTHRITIS Mother     Hypertension Other     Heart Disease Other     Diabetes Other       Social History     Socioeconomic History    Marital status:    Tobacco Use    Smoking status: Former Smoker     Types: Cigarettes    Smokeless tobacco: Never Used    Tobacco comment: At least 30 years ago.    Vaping Use    Vaping Use: Never used   Substance and Sexual Activity    Alcohol use: Never    Drug use: Never      Past Surgical History:   Procedure Laterality Date    HX COLONOSCOPY      HX HERNIA REPAIR  Unknown date    HX KNEE ARTHROSCOPY Right 01/01/2013    HX OTHER SURGICAL  05/16/2017    Eye surgery procedure    HX PARTIAL HYSTERECTOMY  Unknown date      Past Medical History:   Diagnosis Date    Acute sinusitis 11/13/2020    Arthritis     COVID-19 11/22/2020    Cyst of ovary 11/13/2020    Essential (primary) hypertension 9/26/2017    Glaucoma 11/30/2018    Gout 8/9/2019    Hypercholesterolemia 11/13/2020    Impacted cerumen 11/13/2020    Menopause     Mild intermittent asthma, uncomplicated 1/69/1401    Mixed hyperlipidemia due to type 2 diabetes mellitus ([facility] 9/26/2017    Painless rectal bleeding 11/30/2018    Type 2 diabetes mellitus without complications ([facility] 1/15/5022        I have reviewed and agree with [address] [place] [address] Nw and ROS and intake form in chart and the record furthermore I have reviewed prior medical record(s) regarding this patients care during this appointment. Review of Systems:   Patient is a pleasant appearing individual, appropriately dressed, well hydrated, well nourished, who is alert, appropriately oriented for age, and in no acute distress with a normal gait and normal affect who does not appear to be in any significant pain. Physical Exam:  Right Shoulder - Grossly neurovascularly intact. Range of motion-Decreased actively and passively. No Point tenderness, Strength-weakness with abduction, positive crepitation, No skin lesion are identified, No instabilty is noted, Positive apprehension, No Swelling. Left Shoulder - Grossly neurovascularly intact, Full Range of motion, No point tenderness, No weakness, No skin lesions, No Instability, No apprehension, No swelling. Encounter Diagnoses     ICD-10-CM ICD-9-CM   1. Right shoulder pain, unspecified chronicity  M25.511 719.41   2. Arthritis of right shoulder region  M19.011 716.91       HPI:  The patient is here with a chief complaint of right shoulder pain, throbbing, burning pain. X-rays are positive for severe OA of the right shoulder along with MRI which shows glenohumeral arthritis with no evidence of any rotator cuff pathology. Assessment/Plan:  Plan will be for right shoulder resurfacing, general medical and cardiac clearance.   Risk and benefits of surgery explained to her. We are going to proceed once she gets set up for surgery. As part of continued conservative pain management options the patient was advised to utilize Tylenol or OTC NSAIDS as long as it is not medically contraindicated. Return to Office: Follow-up and Dispositions    · Return for schedule for surgery. Scribed by Jackelyn Simental LPN as dictated by [facility] [place] KAYLAH Paris MD.  Documentation True and Accepted [facility] KAYLAH Paris MD

## 2022-07-13 NOTE — LETTER
7/18/2022    Patient: Marcelino Fernandez   YOB: 1950   Date of Visit: 7/13/2022     Naomi Taylor NP  [id number] [facility]  Via In Basket    Dear Naomi Taylor NP,      Thank you for referring Ms. Lei Mcgovern to [address] [place] [address] AND SPORTS MEDICINE for evaluation. My notes for this consultation are attached. If you have questions, please do not hesitate to call me. I look forward to following your patient along with you.       Sincerely,    Hector Gomez MD

## 2022-07-16 DIAGNOSIS — E11.9 TYPE 2 DIABETES MELLITUS WITHOUT COMPLICATION, WITHOUT LONG-TERM CURRENT USE OF INSULIN (HCC): ICD-10-CM

## 2022-07-17 RX ORDER — CALCIUM CITRATE/VITAMIN D3 200MG-6.25
TABLET ORAL
Qty: 100 STRIP | Refills: 0 | Status: SHIPPED | OUTPATIENT
Start: 2022-07-17 | End: 2022-09-30

## 2022-07-25 DIAGNOSIS — E11.9 TYPE 2 DIABETES MELLITUS WITHOUT COMPLICATION, WITHOUT LONG-TERM CURRENT USE OF INSULIN (HCC): Primary | ICD-10-CM

## 2022-07-25 RX ORDER — INSULIN GLARGINE 100 [IU]/ML
10 INJECTION, SOLUTION SUBCUTANEOUS DAILY
Qty: 9 ML | Refills: 0 | Status: SHIPPED | OUTPATIENT
Start: 2022-07-25 | End: 2022-07-29

## 2022-07-28 ENCOUNTER — TELEPHONE (OUTPATIENT)
Dept: PRIMARY CARE CLINIC | Age: 72
End: 2022-07-28

## 2022-07-29 DIAGNOSIS — E11.9 TYPE 2 DIABETES MELLITUS WITHOUT COMPLICATION, WITHOUT LONG-TERM CURRENT USE OF INSULIN (HCC): ICD-10-CM

## 2022-07-29 RX ORDER — INSULIN GLARGINE 100 [IU]/ML
INJECTION, SOLUTION SUBCUTANEOUS
Qty: 15 ML | Refills: 0 | Status: SHIPPED | OUTPATIENT
Start: 2022-07-29

## 2022-09-06 DIAGNOSIS — I10 ESSENTIAL (PRIMARY) HYPERTENSION: ICD-10-CM

## 2022-09-06 DIAGNOSIS — E11.9 TYPE 2 DIABETES MELLITUS WITHOUT COMPLICATION, WITHOUT LONG-TERM CURRENT USE OF INSULIN (HCC): ICD-10-CM

## 2022-09-06 DIAGNOSIS — E78.2 MIXED HYPERLIPIDEMIA DUE TO TYPE 2 DIABETES MELLITUS (HCC): ICD-10-CM

## 2022-09-06 DIAGNOSIS — E11.69 MIXED HYPERLIPIDEMIA DUE TO TYPE 2 DIABETES MELLITUS (HCC): ICD-10-CM

## 2022-09-07 DIAGNOSIS — Z12.31 SCREENING MAMMOGRAM FOR BREAST CANCER: Primary | ICD-10-CM

## 2022-09-07 RX ORDER — GLIPIZIDE 5 MG/1
TABLET ORAL
Qty: 360 TABLET | Refills: 0 | Status: SHIPPED | OUTPATIENT
Start: 2022-09-07

## 2022-09-07 RX ORDER — HYDROCHLOROTHIAZIDE 25 MG/1
TABLET ORAL
Qty: 90 TABLET | Refills: 0 | Status: SHIPPED | OUTPATIENT
Start: 2022-09-07

## 2022-09-07 RX ORDER — PRAVASTATIN SODIUM 40 MG/1
TABLET ORAL
Qty: 90 TABLET | Refills: 0 | Status: SHIPPED | OUTPATIENT
Start: 2022-09-07

## 2022-09-12 ENCOUNTER — OFFICE VISIT (OUTPATIENT)
Dept: PRIMARY CARE CLINIC | Age: 72
End: 2022-09-12
Payer: MEDICARE

## 2022-09-12 VITALS
HEIGHT: 66 IN | DIASTOLIC BLOOD PRESSURE: 78 MMHG | TEMPERATURE: 97.5 F | OXYGEN SATURATION: 98 % | WEIGHT: 235.4 LBS | RESPIRATION RATE: 18 BRPM | SYSTOLIC BLOOD PRESSURE: 150 MMHG | HEART RATE: 103 BPM | BODY MASS INDEX: 37.83 KG/M2

## 2022-09-12 DIAGNOSIS — Z01.818 PREOP EXAMINATION: ICD-10-CM

## 2022-09-12 DIAGNOSIS — H40.9 GLAUCOMA OF BOTH EYES, UNSPECIFIED GLAUCOMA TYPE: ICD-10-CM

## 2022-09-12 DIAGNOSIS — R06.02 SHORTNESS OF BREATH: ICD-10-CM

## 2022-09-12 DIAGNOSIS — E78.2 MIXED HYPERLIPIDEMIA DUE TO TYPE 2 DIABETES MELLITUS (HCC): ICD-10-CM

## 2022-09-12 DIAGNOSIS — I10 ESSENTIAL (PRIMARY) HYPERTENSION: ICD-10-CM

## 2022-09-12 DIAGNOSIS — E11.69 MIXED HYPERLIPIDEMIA DUE TO TYPE 2 DIABETES MELLITUS (HCC): ICD-10-CM

## 2022-09-12 DIAGNOSIS — M1A.9XX0 CHRONIC GOUT WITHOUT TOPHUS, UNSPECIFIED CAUSE, UNSPECIFIED SITE: ICD-10-CM

## 2022-09-12 DIAGNOSIS — E66.01 SEVERE OBESITY (HCC): ICD-10-CM

## 2022-09-12 DIAGNOSIS — E11.9 TYPE 2 DIABETES MELLITUS WITHOUT COMPLICATION, WITHOUT LONG-TERM CURRENT USE OF INSULIN (HCC): Primary | ICD-10-CM

## 2022-09-12 DIAGNOSIS — J45.20 MILD INTERMITTENT ASTHMA, UNCOMPLICATED: ICD-10-CM

## 2022-09-12 DIAGNOSIS — M15.9 OSTEOARTHRITIS INVOLVING MULTIPLE JOINTS ON BOTH SIDES OF BODY: ICD-10-CM

## 2022-09-12 LAB — HBA1C MFR BLD HPLC: 6.5 %

## 2022-09-12 PROCEDURE — 3017F COLORECTAL CA SCREEN DOC REV: CPT | Performed by: NURSE PRACTITIONER

## 2022-09-12 PROCEDURE — G8753 SYS BP > OR = 140: HCPCS | Performed by: NURSE PRACTITIONER

## 2022-09-12 PROCEDURE — G8417 CALC BMI ABV UP PARAM F/U: HCPCS | Performed by: NURSE PRACTITIONER

## 2022-09-12 PROCEDURE — G8432 DEP SCR NOT DOC, RNG: HCPCS | Performed by: NURSE PRACTITIONER

## 2022-09-12 PROCEDURE — 1090F PRES/ABSN URINE INCON ASSESS: CPT | Performed by: NURSE PRACTITIONER

## 2022-09-12 PROCEDURE — 3044F HG A1C LEVEL LT 7.0%: CPT | Performed by: NURSE PRACTITIONER

## 2022-09-12 PROCEDURE — G9899 SCRN MAM PERF RSLTS DOC: HCPCS | Performed by: NURSE PRACTITIONER

## 2022-09-12 PROCEDURE — 83036 HEMOGLOBIN GLYCOSYLATED A1C: CPT | Performed by: NURSE PRACTITIONER

## 2022-09-12 PROCEDURE — 2022F DILAT RTA XM EVC RTNOPTHY: CPT | Performed by: NURSE PRACTITIONER

## 2022-09-12 PROCEDURE — 1123F ACP DISCUSS/DSCN MKR DOCD: CPT | Performed by: NURSE PRACTITIONER

## 2022-09-12 PROCEDURE — G8427 DOCREV CUR MEDS BY ELIG CLIN: HCPCS | Performed by: NURSE PRACTITIONER

## 2022-09-12 PROCEDURE — 99214 OFFICE O/P EST MOD 30 MIN: CPT | Performed by: NURSE PRACTITIONER

## 2022-09-12 PROCEDURE — G8399 PT W/DXA RESULTS DOCUMENT: HCPCS | Performed by: NURSE PRACTITIONER

## 2022-09-12 PROCEDURE — G8754 DIAS BP LESS 90: HCPCS | Performed by: NURSE PRACTITIONER

## 2022-09-12 PROCEDURE — G8536 NO DOC ELDER MAL SCRN: HCPCS | Performed by: NURSE PRACTITIONER

## 2022-09-12 PROCEDURE — 1101F PT FALLS ASSESS-DOCD LE1/YR: CPT | Performed by: NURSE PRACTITIONER

## 2022-09-12 NOTE — PROGRESS NOTES
Minnie Albright is a 74 y.o. female who presents to the office today for the following:    Chief Complaint   Patient presents with    Pre-op Exam       Past Medical History:   Diagnosis Date    Acute sinusitis 11/13/2020    Arthritis     COVID-19 11/22/2020    Cyst of ovary 11/13/2020    Essential (primary) hypertension 9/26/2017    Glaucoma 11/30/2018    Gout 8/9/2019    Hypercholesterolemia 11/13/2020    Impacted cerumen 11/13/2020    Menopause     Mild intermittent asthma, uncomplicated 8/54/0769    Mixed hyperlipidemia due to type 2 diabetes mellitus ([address] 9/26/2017    Painless rectal bleeding 11/30/2018    Type 2 diabetes mellitus without complications ([address] 8/44/4439       Past Surgical History:   Procedure Laterality Date    HX COLONOSCOPY      HX HERNIA REPAIR  Unknown date    HX KNEE ARTHROSCOPY Right 01/01/2013    HX OTHER SURGICAL  05/16/2017    Eye surgery procedure    HX PARTIAL HYSTERECTOMY  Unknown date        Family History   Problem Relation Age of Onset    OSTEOARTHRITIS Mother     Hypertension Other     Heart Disease Other     Diabetes Other         Social History     Tobacco Use    Smoking status: Former     Types: Cigarettes    Smokeless tobacco: Never    Tobacco comments: At least 30 years ago. Vaping Use    Vaping Use: Never used   Substance Use Topics    Alcohol use: Never    Drug use: Never        HPI  Patient here today for follow-up of diabetes and for preop examination with PMH of asthma, hypertension, hyperlipidemia, glaucoma, gout, type 2 diabetes, obesity and arthritis. Reports taking medicine as noted in chart. Has no specific new concerns today. Has been following with cardiology but told that recent testing does not indicate any cardiac concern at this time. Is anticipated to have surgery around 10/6/22 on right shoulder.      Current Outpatient Medications on File Prior to Visit   Medication Sig    cloNIDine HCL (CATAPRES) 0.2 mg tablet Take 1 Tablet by mouth three (3) times daily.    pravastatin (PRAVACHOL) 40 mg tablet TAKE 1 TABLET EVERY DAY    glipiZIDE (GLUCOTROL) 5 mg tablet TAKE 2 TABLETS TWICE DAILY    hydroCHLOROthiazide (HYDRODIURIL) 25 mg tablet TAKE 1 TABLET EVERY DAY    Lantus Solostar U-100 Insulin 100 unit/mL (3 mL) inpn INJECT 10 UNITS SUBCUTANEOUSLY ONCE DAILY    glucose blood VI test strips (True Metrix Glucose Test Strip) strip TEST BLOOD SUGAR TWICE DAILY    carvediloL (COREG) 12.5 mg tablet TAKE 1 TABLET BY MOUTH TWICE DAILY WITH FOOD    True Metrix Glucose Meter monitoring kit     TRUEplus Lancets 33 gauge misc     Trulicity 9.54 SZ/5.9 mL sub-q pen INJECT THE CONTENTS OF 1 PEN (0.5 ML) UNDER THE SKIN ONCE WEEKLY. metFORMIN (GLUCOPHAGE) 1,000 mg tablet TAKE 1 TABLET TWICE DAILY WITH MEALS    triamcinolone acetonide (KENALOG) 0.1 % ointment APPLY  TO AFFECTED AREA TWO (2) TIMES A DAY. USE THIN LAYER    allopurinoL (ZYLOPRIM) 100 mg tablet TAKE 1 TABLET EVERY DAY    alcohol swabs (BD Single Use Swabs Regular) padm USE  TO TEST BLOOD SUGAR EVERY DAY    lisinopriL (PRINIVIL, ZESTRIL) 20 mg tablet TAKE 1 TABLET EVERY DAY    [DISCONTINUED] atenoloL (TENORMIN) 50 mg tablet TAKE 1 TABLET EVERY DAY    amLODIPine (NORVASC) 10 mg tablet TAKE 1 TABLET EVERY DAY    albuterol (PROVENTIL HFA, VENTOLIN HFA, PROAIR HFA) 90 mcg/actuation inhaler Take 2 Puffs by inhalation every four (4) hours as needed for Wheezing. Insulin Needles, Disposable, 32 gauge x 5/32\" ndle Use pen needle to inject medicine every 7 days    Wixela Inhub 100-50 mcg/dose diskus inhaler Take 1 Puff by inhalation two (2) times a day. timolol (TIMOPTIC) 0.5 % ophthalmic solution     brimonidine (ALPHAGAN) 0.2 % ophthalmic solution Administer 1 Drop to left eye three (3) times daily. dorzolamide (TRUSOPT) 2 % ophthalmic solution Administer 1 Drop to left eye three (3) times daily. latanoprost (XALATAN) 0.005 % ophthalmic solution Administer 2 Drops to both eyes three (3) times daily.      No current facility-administered medications on file prior to visit. No orders of the defined types were placed in this encounter. Review of Systems   Constitutional: Negative. HENT: Negative. Eyes: Negative. Respiratory: Shortness of breath   Cardiovascular: Negative. Gastrointestinal: Negative. Genitourinary: Negative. Musculoskeletal: Positive for back pain, joint pain and myalgias. Negative for falls and neck pain. Skin: rash to right breast, itching  Neurological: Negative. Endo/Heme/Allergies: Negative for polydipsia. Psychiatric/Behavioral: Negative. Visit Vitals  BP (!) 150/78 (BP 1 Location: Left upper arm, BP Patient Position: Sitting, BP Cuff Size: Large adult)   Pulse (!) 103   Temp 97.5 °F (36.4 °C) (Temporal)   Resp 18   Ht 5' 6\" (1.676 m)   Wt 235 lb 6.4 oz (106.8 kg)   SpO2 98%   BMI 37.99 kg/m²       Physical Exam  Vitals and nursing note reviewed. Constitutional:       Appearance: Normal appearance. She is obese. Neck:      Vascular: No carotid bruit. Cardiovascular:      Rate and Rhythm: Normal rate and regular rhythm. Pulses: Normal pulses. Heart sounds: Normal heart sounds. Pulmonary:      Effort: Pulmonary effort is normal.      Breath sounds: Normal breath sounds. Chest:       Abdominal:      General: Bowel sounds are normal.      Palpations: Abdomen is soft. Tenderness: There is no abdominal tenderness. There is no guarding. Musculoskeletal:      Right shoulder: Tenderness present. Decreased range of motion. Right knee: Normal.      Left knee: Tenderness present over the medial joint line. Right lower leg: Edema (trace) present. Left lower leg: Edema (trace) present. Lymphadenopathy:      Cervical: No cervical adenopathy. Skin:     General: Skin is warm and dry. Neurological:      Mental Status: She is alert and oriented to person, place, and time. Mental status is at baseline.       Gait: Gait abnormal. Psychiatric:         Mood and Affect: Mood normal.         Behavior: Behavior normal.         1. Type 2 diabetes mellitus without complication, without long-term current use of insulin ([facility])  Lab Results   Component Value Date/Time    Hemoglobin A1c 10.1 (H) 03/23/2021 12:29 PM    Hemoglobin A1c (POC) 6.5 09/12/2022 11:11 AM    Hemoglobin A1c, External 8.0 02/10/2020 12:00 AM   Currently on Metformin 1000 mg twice daily, glipizide 10 mg twice daily and trulicity 9.22BN every 7 days, lantus 10 units nightly  Did not bring meter but reports fasting sugars around 160-180s  States she has been trying to follow a diabetic diet along with increasing her exercise as much as possible (is limited due to pain in her left knee)     Glucose is now well controlled  Continue medications as directed  Continue to encourage following diabetic diet along with getting regular exercise 3-5 times weekly   Check fasting sugars and bring to next appointment  Continue regular eye exams  Check feet daily and notify provider immediately if wound develops  - AMB POC HEMOGLOBIN A1C    2. Essential (primary) hypertension  Blood pressure is elevated today but reports she just took medication  Will have her continue medications as directed as well as clarify home bottles (discrepancy on the carvedilol and clonidine)  She will RTO in 2 days for repeat blood pressure as well as monitor at home to bring to nurse visit      3. Mixed hyperlipidemia due to type 2 diabetes mellitus ([facility]  Lab Results   Component Value Date/Time    LDL,Direct 90 11/23/2020 08:35 PM    LDL, calculated 110 (H) 02/21/2022 03:19 PM    LDL, calculated 101 (H) 08/10/2020 11:00 AM   Essentially stable  Continues on pravastatin as directed    4. Mild intermittent asthma, uncomplicated  Symptoms stable  On Wixela and albuterol as needed    5.  Osteoarthritis involving multiple joints on both sides of body  She has pain in multiple joints including shoulders and knees  Has anticipated procedure on right shoulder and told she will knee left knee replacement  We will continue on Tylenol and topical diclofenac as needed    6. Severe obesity ([address]  Has had weight loss since last visit at around 10lb  She is continuing to work on following diabetic diet along with increasing her exercise as much as possible. 7. Chronic gout without tophus, unspecified cause, unspecified site  Stable and on allopurinol as directed    8. Glaucoma of both eyes, unspecified glaucoma type  She continues on eyedrops as directed and followed by ophthalmology    9. Shortness of breath  Have reviewed recent cardiology notes with recent cardiac testing ok  Appears weight related versus asthma  Does have CXR ordered in 7/2022 and advised to have done prior to surgery     9. Preop examination  Feel that she will be acceptable risk for surgery pending cardiac clearance and stable labwork  She has orders for the labs from Dr. Nicolasa Guzman and she will have these done in the next few days along with cxr. Patient verbalizes understanding of plan of care as discussed above    Follow-up and Dispositions    Return in about 3 months (around 12/12/2022), or 2 days repeat blood pressure, for or sooner for worsening symptoms.

## 2022-09-12 NOTE — PROGRESS NOTES
/  Chief Complaint   Patient presents with    Pre-op Exam     Need  written clearance, labs order already and ekg has been done by dr Currie [facility]     1. \"Have you been to the ER, urgent care clinic since your last visit? Hospitalized since your last visit? \" No    2. \"Have you seen or consulted any other health care providers outside of the [address] [place] [address] since your last visit? \" No     3. For patients aged 39-70: Has the patient had a colonoscopy / FIT/ Cologuard? Yes - no Care Gap present      If the patient is female:    4. For patients aged 41-77: Has the patient had a mammogram within the past 2 years? NA - based on age or sex      11. For patients aged 21-65: Has the patient had a pap smear?  NA - based on age or sex

## 2022-09-25 ENCOUNTER — TELEPHONE (OUTPATIENT)
Dept: PRIMARY CARE CLINIC | Age: 72
End: 2022-09-25

## 2022-09-25 RX ORDER — CLONIDINE HYDROCHLORIDE 0.2 MG/1
1 TABLET ORAL 3 TIMES DAILY
COMMUNITY

## 2022-09-25 NOTE — TELEPHONE ENCOUNTER
Please contact patient. She has not had the labs done or cxr. Also need repeat blood pressure at nurse visit. Want to clarify from last note that she is not on atenolol and is taking the carvedilol or which one if she has these at home . Cardiology was also questioning per last note. Also clarify the clonidine.

## 2022-09-26 ENCOUNTER — TELEPHONE (OUTPATIENT)
Dept: PRIMARY CARE CLINIC | Age: 72
End: 2022-09-26

## 2022-09-26 NOTE — TELEPHONE ENCOUNTER
Patient stated she was going to do lab work and have CXR done today but her right eye became infected and she had to go to [facility] to see eye doctor. She called and cancelled surgery with Dr. Genesis Caldwell. Had EKG done by Cardiology. Will have labs and CXR done when surgery is scheduled again. Has to go back to eye doctor again on Friday.

## 2022-09-26 NOTE — TELEPHONE ENCOUNTER
Pt cld & std to advise you her shoulder surgery is postponed due to she has eye infection.  She will reschedule when needed for the pre op

## 2022-09-30 DIAGNOSIS — E11.9 TYPE 2 DIABETES MELLITUS WITHOUT COMPLICATION, WITHOUT LONG-TERM CURRENT USE OF INSULIN (HCC): ICD-10-CM

## 2022-09-30 RX ORDER — CALCIUM CITRATE/VITAMIN D3 200MG-6.25
TABLET ORAL
Qty: 200 STRIP | Refills: 4 | Status: SHIPPED | OUTPATIENT
Start: 2022-09-30

## 2022-10-04 DIAGNOSIS — I10 ESSENTIAL (PRIMARY) HYPERTENSION: ICD-10-CM

## 2022-10-04 RX ORDER — AMLODIPINE BESYLATE 10 MG/1
TABLET ORAL
Qty: 90 TABLET | Refills: 1 | Status: SHIPPED | OUTPATIENT
Start: 2022-10-04

## 2022-10-09 DIAGNOSIS — E11.9 TYPE 2 DIABETES MELLITUS WITHOUT COMPLICATION, WITHOUT LONG-TERM CURRENT USE OF INSULIN (HCC): ICD-10-CM

## 2022-10-09 RX ORDER — ISOPROPYL ALCOHOL 70 ML/100ML
SWAB TOPICAL
Qty: 100 PAD | Refills: 5 | Status: SHIPPED | OUTPATIENT
Start: 2022-10-09

## 2022-10-10 ENCOUNTER — HOSPITAL ENCOUNTER (OUTPATIENT)
Dept: MAMMOGRAPHY | Age: 72
Discharge: HOME | End: 2022-10-10
Attending: NURSE PRACTITIONER
Payer: MEDICARE

## 2022-10-10 DIAGNOSIS — Z12.31 SCREENING MAMMOGRAM FOR BREAST CANCER: ICD-10-CM

## 2022-10-10 PROCEDURE — 77063 BREAST TOMOSYNTHESIS BI: CPT

## 2022-10-13 NOTE — PROGRESS NOTES
FINDINGS: Bilateral digital screening mammography was performed and is  interpreted in conjunction with a computer assisted detection (CAD) system. Additionally, tomosynthesis of both breasts in the CC and MLO projections was  performed. No suspicious masses or calcifications are identified. There has been  no significant change.     IMPRESSION  BI-RADS 1: Negative. No mammographic evidence of malignancy.     RECOMMENDATIONS:  Next screening mammogram is recommended in one year.    Notify provider if any changes in breast noted sooner on self exam

## 2022-10-14 NOTE — PROGRESS NOTES
Informed patient of negative mammogram, need for repeat in 1 year, continue self breast exams and notify patient of any changes. Patient stated understanding.

## 2022-10-26 ENCOUNTER — TELEPHONE (OUTPATIENT)
Dept: PRIMARY CARE CLINIC | Age: 72
End: 2022-10-26

## 2022-10-26 NOTE — TELEPHONE ENCOUNTER
Pt cld & request that you give her a call. Wanted an appt but advised her we had no appts available.   Call her at [phone] or [phone]

## 2022-10-31 ENCOUNTER — OFFICE VISIT (OUTPATIENT)
Dept: PRIMARY CARE CLINIC | Age: 72
End: 2022-10-31
Payer: MEDICARE

## 2022-10-31 VITALS
WEIGHT: 231 LBS | HEART RATE: 95 BPM | BODY MASS INDEX: 37.12 KG/M2 | SYSTOLIC BLOOD PRESSURE: 164 MMHG | TEMPERATURE: 97.4 F | OXYGEN SATURATION: 100 % | HEIGHT: 66 IN | DIASTOLIC BLOOD PRESSURE: 96 MMHG | RESPIRATION RATE: 18 BRPM

## 2022-10-31 DIAGNOSIS — F41.1 GENERALIZED ANXIETY DISORDER: Primary | ICD-10-CM

## 2022-10-31 DIAGNOSIS — I10 PRIMARY HYPERTENSION: ICD-10-CM

## 2022-10-31 DIAGNOSIS — H54.61 VISION LOSS OF RIGHT EYE: ICD-10-CM

## 2022-10-31 PROCEDURE — 1090F PRES/ABSN URINE INCON ASSESS: CPT | Performed by: NURSE PRACTITIONER

## 2022-10-31 PROCEDURE — G8536 NO DOC ELDER MAL SCRN: HCPCS | Performed by: NURSE PRACTITIONER

## 2022-10-31 PROCEDURE — 1123F ACP DISCUSS/DSCN MKR DOCD: CPT | Performed by: NURSE PRACTITIONER

## 2022-10-31 PROCEDURE — G8427 DOCREV CUR MEDS BY ELIG CLIN: HCPCS | Performed by: NURSE PRACTITIONER

## 2022-10-31 PROCEDURE — G8399 PT W/DXA RESULTS DOCUMENT: HCPCS | Performed by: NURSE PRACTITIONER

## 2022-10-31 PROCEDURE — 99214 OFFICE O/P EST MOD 30 MIN: CPT | Performed by: NURSE PRACTITIONER

## 2022-10-31 PROCEDURE — G8755 DIAS BP > OR = 90: HCPCS | Performed by: NURSE PRACTITIONER

## 2022-10-31 PROCEDURE — G9899 SCRN MAM PERF RSLTS DOC: HCPCS | Performed by: NURSE PRACTITIONER

## 2022-10-31 PROCEDURE — 3078F DIAST BP <80 MM HG: CPT | Performed by: NURSE PRACTITIONER

## 2022-10-31 PROCEDURE — G8753 SYS BP > OR = 140: HCPCS | Performed by: NURSE PRACTITIONER

## 2022-10-31 PROCEDURE — 3017F COLORECTAL CA SCREEN DOC REV: CPT | Performed by: NURSE PRACTITIONER

## 2022-10-31 PROCEDURE — G8432 DEP SCR NOT DOC, RNG: HCPCS | Performed by: NURSE PRACTITIONER

## 2022-10-31 PROCEDURE — G8417 CALC BMI ABV UP PARAM F/U: HCPCS | Performed by: NURSE PRACTITIONER

## 2022-10-31 PROCEDURE — 1101F PT FALLS ASSESS-DOCD LE1/YR: CPT | Performed by: NURSE PRACTITIONER

## 2022-10-31 PROCEDURE — 3074F SYST BP LT 130 MM HG: CPT | Performed by: NURSE PRACTITIONER

## 2022-10-31 RX ORDER — PREDNISOLONE ACETATE 10 MG/ML
SUSPENSION/ DROPS OPHTHALMIC
COMMUNITY
Start: 2022-10-26

## 2022-10-31 RX ORDER — OFLOXACIN 3 MG/ML
SOLUTION/ DROPS OPHTHALMIC
COMMUNITY
Start: 2022-09-22

## 2022-10-31 RX ORDER — HYDROXYZINE 25 MG/1
25 TABLET, FILM COATED ORAL
Qty: 90 TABLET | Refills: 1 | Status: SHIPPED | OUTPATIENT
Start: 2022-10-31 | End: 2022-11-10

## 2022-10-31 RX ORDER — VENLAFAXINE HYDROCHLORIDE 37.5 MG/1
37.5 CAPSULE, EXTENDED RELEASE ORAL DAILY
Qty: 90 CAPSULE | Refills: 1 | Status: SHIPPED | OUTPATIENT
Start: 2022-10-31 | End: 2023-01-29

## 2022-10-31 NOTE — PROGRESS NOTES
Jamaal Bernal is a 74 y.o. female who presents to the office today for the following:    Chief Complaint   Patient presents with    Anxiety       Past Medical History:   Diagnosis Date    Acute sinusitis 11/13/2020    Arthritis     COVID-19 11/22/2020    Cyst of ovary 11/13/2020    Essential (primary) hypertension 9/26/2017    Glaucoma 11/30/2018    Gout 8/9/2019    Hypercholesterolemia 11/13/2020    Impacted cerumen 11/13/2020    Menopause     Mild intermittent asthma, uncomplicated 0/96/1853    Mixed hyperlipidemia due to type 2 diabetes mellitus ([address] 9/26/2017    Painless rectal bleeding 11/30/2018    Type 2 diabetes mellitus without complications ([address] 3/16/6742       Past Surgical History:   Procedure Laterality Date    HX COLONOSCOPY      HX HERNIA REPAIR  Unknown date    HX KNEE ARTHROSCOPY Right 01/01/2013    HX OTHER SURGICAL  05/16/2017    Eye surgery procedure    HX PARTIAL HYSTERECTOMY  Unknown date        Family History   Problem Relation Age of Onset    OSTEOARTHRITIS Mother     Hypertension Other     Heart Disease Other     Diabetes Other         Social History     Tobacco Use    Smoking status: Former     Types: Cigarettes    Smokeless tobacco: Never    Tobacco comments: At least 30 years ago. Vaping Use    Vaping Use: Never used   Substance Use Topics    Alcohol use: Never    Drug use: Never        HPI  Patient with PMH of asthma, hypertension, hyperlipidemia, glaucoma, gout, type 2 diabetes, obesity and arthritis who presents with concerns regarding anxiety. States that she has been having symtpoms off and on but worsened since eye surgery. Has been told that vision in right eye cannot be improved due to permanent nerve damage. Has been feeling overwhelmed and had panic attack few days ago. Sleep has also been difficult. Some mild depression but feels mostly it has been anxiety.     Current Outpatient Medications on File Prior to Visit   Medication Sig    prednisoLONE acetate (PRED FORTE) 1 % ophthalmic suspension INSTILL 1 DROP IN RIGHT EYE EVERY 2 HOURS WHILE AWAKE    ofloxacin (OCUFLOX) 0.3 % ophthalmic solution INSTILL 1 DROP INTO RIGHT EYE 4 TIMES DAILY    DropSafe Alcohol Prep Pads padm USE  TO TEST BLOOD SUGAR EVERY DAY    amLODIPine (NORVASC) 10 mg tablet TAKE 1 TABLET EVERY DAY    True Metrix Glucose Test Strip strip TEST BLOOD SUGAR TWICE DAILY    cloNIDine HCL (CATAPRES) 0.2 mg tablet Take 1 Tablet by mouth three (3) times daily. pravastatin (PRAVACHOL) 40 mg tablet TAKE 1 TABLET EVERY DAY    glipiZIDE (GLUCOTROL) 5 mg tablet TAKE 2 TABLETS TWICE DAILY    hydroCHLOROthiazide (HYDRODIURIL) 25 mg tablet TAKE 1 TABLET EVERY DAY    Lantus Solostar U-100 Insulin 100 unit/mL (3 mL) inpn INJECT 10 UNITS SUBCUTANEOUSLY ONCE DAILY    carvediloL (COREG) 12.5 mg tablet TAKE 1 TABLET BY MOUTH TWICE DAILY WITH FOOD    True Metrix Glucose Meter monitoring kit     TRUEplus Lancets 33 gauge misc     Trulicity 0.97 DO/9.6 mL sub-q pen INJECT THE CONTENTS OF 1 PEN (0.5 ML) UNDER THE SKIN ONCE WEEKLY. metFORMIN (GLUCOPHAGE) 1,000 mg tablet TAKE 1 TABLET TWICE DAILY WITH MEALS    triamcinolone acetonide (KENALOG) 0.1 % ointment APPLY  TO AFFECTED AREA TWO (2) TIMES A DAY. USE THIN LAYER    allopurinoL (ZYLOPRIM) 100 mg tablet TAKE 1 TABLET EVERY DAY    lisinopriL (PRINIVIL, ZESTRIL) 20 mg tablet TAKE 1 TABLET EVERY DAY    albuterol (PROVENTIL HFA, VENTOLIN HFA, PROAIR HFA) 90 mcg/actuation inhaler Take 2 Puffs by inhalation every four (4) hours as needed for Wheezing. Insulin Needles, Disposable, 32 gauge x 5/32\" ndle Use pen needle to inject medicine every 7 days    Wixela Inhub 100-50 mcg/dose diskus inhaler Take 1 Puff by inhalation two (2) times a day. timolol (TIMOPTIC) 0.5 % ophthalmic solution     brimonidine (ALPHAGAN) 0.2 % ophthalmic solution Administer 1 Drop to left eye three (3) times daily.     dorzolamide (TRUSOPT) 2 % ophthalmic solution Administer 1 Drop to left eye three (3) times daily.    latanoprost (XALATAN) 0.005 % ophthalmic solution Administer 2 Drops to both eyes three (3) times daily. No current facility-administered medications on file prior to visit. Medications Ordered Today   Medications    venlafaxine-SR (EFFEXOR-XR) 37.5 mg capsule     Sig: Take 1 Capsule by mouth daily for 90 days. Dispense:  90 Capsule     Refill:  1    hydrOXYzine HCL (ATARAX) 25 mg tablet     Sig: Take 1 Tablet by mouth three (3) times daily as needed for Anxiety for up to 10 days. Dispense:  90 Tablet     Refill:  1        Review of Systems   Constitutional: Negative. HENT: Negative. Eyes: Negative. Respiratory: Negative. Cardiovascular: Negative. Gastrointestinal: Negative. Genitourinary: Negative. Musculoskeletal:  Positive for back pain, joint pain and myalgias. Negative for falls and neck pain. Skin: Negative. Neurological: Negative. Endo/Heme/Allergies:  Negative for polydipsia. Psychiatric/Behavioral:  Positive for depression. Negative for hallucinations, memory loss, substance abuse and suicidal ideas. The patient is nervous/anxious and has insomnia. Visit Vitals  BP (!) 164/96 (BP 1 Location: Left upper arm, BP Patient Position: Sitting, BP Cuff Size: Large adult)   Pulse 95   Temp 97.4 °F (36.3 °C) (Temporal)   Resp 18   Ht 5' 6\" (1.676 m)   Wt 231 lb (104.8 kg)   SpO2 100%   BMI 37.28 kg/m²       Physical Exam  Vitals and nursing note reviewed. Constitutional:       Appearance: Normal appearance. Eyes:      Pupils: Pupils are equal, round, and reactive to light. Cardiovascular:      Rate and Rhythm: Normal rate and regular rhythm. Pulses: Normal pulses. Heart sounds: Normal heart sounds. Pulmonary:      Effort: Pulmonary effort is normal.      Breath sounds: Normal breath sounds. Abdominal:      General: Bowel sounds are normal.      Palpations: Abdomen is soft. Musculoskeletal:         General: Normal range of motion. Skin:     General: Skin is warm and dry. Neurological:      Mental Status: She is alert and oriented to person, place, and time. Mental status is at baseline. Psychiatric:         Mood and Affect: Mood is anxious. Behavior: Behavior normal.          1. Primary hypertension  Blood pressure not at goal  She reports that she is taking at home and readings under 873 systolic  Continue amlodipine, carvedilol, HCTZ and lisinopril as directed  Reports she takes her blood pressure meds at 12pm and have advised that she take at couple earlier so that blood pressure not spiking  Bring home readings to next visit in 4 weeks     2. Generalized anxiety disorder  Start on effexor 37.5mg daily and reviewed use/side effects  Requesting something to use prn for anxiety and will send hydroxyzine to use prn  Reviewed use and side effects. Do not drive/operate machinery when taking  Declines counseling for now and wants to try medication  Re-evaluate in 4 weeks or sooner if needed  - venlafaxine-SR (EFFEXOR-XR) 37.5 mg capsule; Take 1 Capsule by mouth daily for 90 days. Dispense: 90 Capsule; Refill: 1  - hydrOXYzine HCL (ATARAX) 25 mg tablet; Take 1 Tablet by mouth three (3) times daily as needed for Anxiety for up to 10 days. Dispense: 90 Tablet; Refill: 1    3. Vision loss of right eye  Recent surgery and vision cannot be restored further  Continues care with opthalmology        We discussed the expected course, resolution and complications of the diagnosis(es) in detail. Medication risks, benefits, costs, interactions, and alternatives were discussed as indicated. I advised her to contact the office if her condition worsens, changes or fails to improve as anticipated. She expressed understanding with the diagnosis(es) and plan. Follow-up and Dispositions    Return in about 4 weeks (around 11/28/2022) for or sooner for worsening symptoms.

## 2022-10-31 NOTE — PROGRESS NOTES
Chief Complaint   Patient presents with    Anxiety     Pt thinks she is having some anxiety, started after she had eye surgery. Pt just took her BP meds when she got to the office     1. Have you been to the ER, urgent care clinic since your last visit? Hospitalized since your last visit? No    2. Have you seen or consulted any other health care providers outside of the [address] [place] [address] since your last visit? Include any pap smears or colon screening.  No

## 2022-12-01 DIAGNOSIS — M1A.9XX0 CHRONIC GOUT WITHOUT TOPHUS, UNSPECIFIED CAUSE, UNSPECIFIED SITE: ICD-10-CM

## 2022-12-01 RX ORDER — ALLOPURINOL 100 MG/1
TABLET ORAL
Qty: 90 TABLET | Refills: 1 | Status: SHIPPED | OUTPATIENT
Start: 2022-12-01

## 2022-12-19 RX ORDER — LANCETS 33 GAUGE
EACH MISCELLANEOUS
Qty: 200 EACH | Refills: 0 | Status: SHIPPED | OUTPATIENT
Start: 2022-12-19

## 2023-01-26 DIAGNOSIS — E11.9 TYPE 2 DIABETES MELLITUS WITHOUT COMPLICATION, WITHOUT LONG-TERM CURRENT USE OF INSULIN (HCC): ICD-10-CM

## 2023-01-26 RX ORDER — METFORMIN HYDROCHLORIDE 1000 MG/1
TABLET ORAL
Qty: 180 TABLET | Refills: 1 | Status: SHIPPED | OUTPATIENT
Start: 2023-01-26

## 2023-02-09 DIAGNOSIS — L28.2 PRURITIC RASH: ICD-10-CM

## 2023-02-09 RX ORDER — TRIAMCINOLONE ACETONIDE 1 MG/G
OINTMENT TOPICAL 2 TIMES DAILY
Qty: 30 G | Refills: 1 | Status: SHIPPED | OUTPATIENT
Start: 2023-02-09

## 2023-02-20 DIAGNOSIS — E11.9 TYPE 2 DIABETES MELLITUS WITHOUT COMPLICATION, WITHOUT LONG-TERM CURRENT USE OF INSULIN (HCC): ICD-10-CM

## 2023-02-20 DIAGNOSIS — E11.69 MIXED HYPERLIPIDEMIA DUE TO TYPE 2 DIABETES MELLITUS (HCC): ICD-10-CM

## 2023-02-20 DIAGNOSIS — E78.2 MIXED HYPERLIPIDEMIA DUE TO TYPE 2 DIABETES MELLITUS (HCC): ICD-10-CM

## 2023-02-20 DIAGNOSIS — I10 ESSENTIAL (PRIMARY) HYPERTENSION: ICD-10-CM

## 2023-02-20 RX ORDER — GLIPIZIDE 5 MG/1
TABLET ORAL
Qty: 360 TABLET | Refills: 0 | Status: SHIPPED | OUTPATIENT
Start: 2023-02-20

## 2023-02-20 RX ORDER — PRAVASTATIN SODIUM 40 MG/1
TABLET ORAL
Qty: 90 TABLET | Refills: 0 | Status: SHIPPED | OUTPATIENT
Start: 2023-02-20

## 2023-02-20 RX ORDER — HYDROCHLOROTHIAZIDE 25 MG/1
TABLET ORAL
Qty: 90 TABLET | Refills: 0 | Status: SHIPPED | OUTPATIENT
Start: 2023-02-20

## 2023-03-10 DIAGNOSIS — E11.9 TYPE 2 DIABETES MELLITUS WITHOUT COMPLICATION, WITHOUT LONG-TERM CURRENT USE OF INSULIN (HCC): ICD-10-CM

## 2023-03-10 RX ORDER — CALCIUM CITRATE/VITAMIN D3 200MG-6.25
TABLET ORAL
Qty: 50 STRIP | Refills: 5 | Status: SHIPPED | OUTPATIENT
Start: 2023-03-10

## 2023-04-02 RX ORDER — INSULIN PUMP SYRINGE, 3 ML
EACH MISCELLANEOUS
Qty: 1 KIT | Refills: 0 | Status: SHIPPED | OUTPATIENT
Start: 2023-04-02

## 2023-05-24 RX ORDER — AMLODIPINE BESYLATE 10 MG/1
TABLET ORAL
Qty: 90 TABLET | Refills: 1 | Status: SHIPPED | OUTPATIENT
Start: 2023-05-24

## 2023-05-25 RX ORDER — INSULIN GLARGINE 100 [IU]/ML
INJECTION, SOLUTION SUBCUTANEOUS
Qty: 9 ML | Refills: 0 | Status: SHIPPED | OUTPATIENT
Start: 2023-05-25

## 2023-06-05 ENCOUNTER — OFFICE VISIT (OUTPATIENT)
Facility: CLINIC | Age: 73
End: 2023-06-05
Payer: MEDICARE

## 2023-06-05 VITALS
HEIGHT: 66 IN | DIASTOLIC BLOOD PRESSURE: 82 MMHG | TEMPERATURE: 97.4 F | HEART RATE: 93 BPM | RESPIRATION RATE: 20 BRPM | WEIGHT: 234.6 LBS | SYSTOLIC BLOOD PRESSURE: 148 MMHG | OXYGEN SATURATION: 98 % | BODY MASS INDEX: 37.7 KG/M2

## 2023-06-05 DIAGNOSIS — M15.9 PRIMARY OSTEOARTHRITIS INVOLVING MULTIPLE JOINTS: ICD-10-CM

## 2023-06-05 DIAGNOSIS — Z12.11 SCREENING FOR MALIGNANT NEOPLASM OF COLON: ICD-10-CM

## 2023-06-05 DIAGNOSIS — J45.20 MILD INTERMITTENT ASTHMA WITHOUT COMPLICATION: ICD-10-CM

## 2023-06-05 DIAGNOSIS — L28.2 PRURITIC RASH: ICD-10-CM

## 2023-06-05 DIAGNOSIS — I51.89 DIASTOLIC DYSFUNCTION: ICD-10-CM

## 2023-06-05 DIAGNOSIS — I10 ESSENTIAL (PRIMARY) HYPERTENSION: ICD-10-CM

## 2023-06-05 DIAGNOSIS — Z00.00 MEDICARE ANNUAL WELLNESS VISIT, SUBSEQUENT: ICD-10-CM

## 2023-06-05 DIAGNOSIS — E78.2 MIXED HYPERLIPIDEMIA: ICD-10-CM

## 2023-06-05 DIAGNOSIS — E11.9 TYPE 2 DIABETES MELLITUS WITHOUT COMPLICATION, WITHOUT LONG-TERM CURRENT USE OF INSULIN (HCC): Primary | ICD-10-CM

## 2023-06-05 DIAGNOSIS — E66.01 MORBID (SEVERE) OBESITY DUE TO EXCESS CALORIES (HCC): ICD-10-CM

## 2023-06-05 DIAGNOSIS — M1A.9XX0 CHRONIC GOUT WITHOUT TOPHUS, UNSPECIFIED CAUSE, UNSPECIFIED SITE: ICD-10-CM

## 2023-06-05 DIAGNOSIS — H40.9 GLAUCOMA, UNSPECIFIED GLAUCOMA TYPE, UNSPECIFIED LATERALITY: ICD-10-CM

## 2023-06-05 PROBLEM — M17.12 PRIMARY OSTEOARTHRITIS OF LEFT KNEE: Status: ACTIVE | Noted: 2017-08-08

## 2023-06-05 PROBLEM — M19.011 OSTEOARTHRITIS OF RIGHT GLENOHUMERAL JOINT: Status: ACTIVE | Noted: 2022-03-23

## 2023-06-05 PROBLEM — M19.90 ARTHRITIS: Status: ACTIVE | Noted: 2021-09-10

## 2023-06-05 LAB
BASOPHILS # BLD AUTO: 0 X10E3/UL (ref 0–0.2)
BASOPHILS NFR BLD AUTO: 0 %
EOSINOPHIL # BLD AUTO: 0 X10E3/UL (ref 0–0.4)
EOSINOPHIL NFR BLD AUTO: 1 %
ERYTHROCYTE [DISTWIDTH] IN BLOOD BY AUTOMATED COUNT: 13.9 % (ref 11.7–15.4)
HBA1C MFR BLD: 7.9 %
HCT VFR BLD AUTO: 36.5 % (ref 34–46.6)
HGB BLD-MCNC: 12.4 G/DL (ref 11.1–15.9)
IMM GRANULOCYTES # BLD AUTO: 0 X10E3/UL (ref 0–0.1)
IMM GRANULOCYTES NFR BLD AUTO: 0 %
LYMPHOCYTES # BLD AUTO: 1.5 X10E3/UL (ref 0.7–3.1)
LYMPHOCYTES NFR BLD AUTO: 26 %
MCH RBC QN AUTO: 29.4 PG (ref 26.6–33)
MCHC RBC AUTO-ENTMCNC: 34 G/DL (ref 31.5–35.7)
MCV RBC AUTO: 87 FL (ref 79–97)
MONOCYTES # BLD AUTO: 0.5 X10E3/UL (ref 0.1–0.9)
MONOCYTES NFR BLD AUTO: 9 %
NEUTROPHILS # BLD AUTO: 3.8 X10E3/UL (ref 1.4–7)
NEUTROPHILS NFR BLD AUTO: 64 %
PLATELET # BLD AUTO: 283 X10E3/UL (ref 150–450)
RBC # BLD AUTO: 4.22 X10E6/UL (ref 3.77–5.28)
WBC # BLD AUTO: 5.9 X10E3/UL (ref 3.4–10.8)

## 2023-06-05 PROCEDURE — 3077F SYST BP >= 140 MM HG: CPT | Performed by: NURSE PRACTITIONER

## 2023-06-05 PROCEDURE — 3046F HEMOGLOBIN A1C LEVEL >9.0%: CPT | Performed by: NURSE PRACTITIONER

## 2023-06-05 PROCEDURE — 3079F DIAST BP 80-89 MM HG: CPT | Performed by: NURSE PRACTITIONER

## 2023-06-05 PROCEDURE — G8417 CALC BMI ABV UP PARAM F/U: HCPCS | Performed by: NURSE PRACTITIONER

## 2023-06-05 PROCEDURE — G8427 DOCREV CUR MEDS BY ELIG CLIN: HCPCS | Performed by: NURSE PRACTITIONER

## 2023-06-05 PROCEDURE — 3017F COLORECTAL CA SCREEN DOC REV: CPT | Performed by: NURSE PRACTITIONER

## 2023-06-05 PROCEDURE — 99214 OFFICE O/P EST MOD 30 MIN: CPT | Performed by: NURSE PRACTITIONER

## 2023-06-05 PROCEDURE — G0439 PPPS, SUBSEQ VISIT: HCPCS | Performed by: NURSE PRACTITIONER

## 2023-06-05 PROCEDURE — G8399 PT W/DXA RESULTS DOCUMENT: HCPCS | Performed by: NURSE PRACTITIONER

## 2023-06-05 PROCEDURE — 1036F TOBACCO NON-USER: CPT | Performed by: NURSE PRACTITIONER

## 2023-06-05 PROCEDURE — 1123F ACP DISCUSS/DSCN MKR DOCD: CPT | Performed by: NURSE PRACTITIONER

## 2023-06-05 PROCEDURE — 1090F PRES/ABSN URINE INCON ASSESS: CPT | Performed by: NURSE PRACTITIONER

## 2023-06-05 PROCEDURE — 2022F DILAT RTA XM EVC RTNOPTHY: CPT | Performed by: NURSE PRACTITIONER

## 2023-06-05 PROCEDURE — 83036 HEMOGLOBIN GLYCOSYLATED A1C: CPT | Performed by: NURSE PRACTITIONER

## 2023-06-05 RX ORDER — DULAGLUTIDE 1.5 MG/.5ML
1.5 INJECTION, SOLUTION SUBCUTANEOUS WEEKLY
Qty: 6 ML | Refills: 1 | Status: SHIPPED | OUTPATIENT
Start: 2023-06-05

## 2023-06-05 RX ORDER — DIPHENHYDRAMINE HCL 25 MG
TABLET ORAL
COMMUNITY
Start: 2023-04-03 | End: 2023-06-05 | Stop reason: ALTCHOICE

## 2023-06-05 RX ORDER — CLONIDINE HYDROCHLORIDE 0.2 MG/1
0.2 TABLET ORAL 3 TIMES DAILY
Qty: 270 TABLET | Refills: 0 | Status: SHIPPED | OUTPATIENT
Start: 2023-06-05

## 2023-06-05 RX ORDER — GLUCOSAM/CHON-MSM1/C/MANG/BOSW 500-416.6
TABLET ORAL
COMMUNITY
Start: 2023-03-03

## 2023-06-05 RX ORDER — CALCIUM CITRATE/VITAMIN D3 200MG-6.25
TABLET ORAL
COMMUNITY
Start: 2023-04-30 | End: 2023-06-05 | Stop reason: ALTCHOICE

## 2023-06-05 RX ORDER — INSULIN GLARGINE 100 [IU]/ML
12 INJECTION, SOLUTION SUBCUTANEOUS NIGHTLY
Qty: 15 ML | Refills: 1 | Status: SHIPPED | OUTPATIENT
Start: 2023-06-05

## 2023-06-05 SDOH — ECONOMIC STABILITY: FOOD INSECURITY: WITHIN THE PAST 12 MONTHS, YOU WORRIED THAT YOUR FOOD WOULD RUN OUT BEFORE YOU GOT MONEY TO BUY MORE.: OFTEN TRUE

## 2023-06-05 SDOH — ECONOMIC STABILITY: TRANSPORTATION INSECURITY
IN THE PAST 12 MONTHS, HAS LACK OF TRANSPORTATION KEPT YOU FROM MEETINGS, WORK, OR FROM GETTING THINGS NEEDED FOR DAILY LIVING?: NO

## 2023-06-05 SDOH — ECONOMIC STABILITY: FOOD INSECURITY: WITHIN THE PAST 12 MONTHS, THE FOOD YOU BOUGHT JUST DIDN'T LAST AND YOU DIDN'T HAVE MONEY TO GET MORE.: SOMETIMES TRUE

## 2023-06-05 SDOH — ECONOMIC STABILITY: INCOME INSECURITY: IN THE LAST 12 MONTHS, WAS THERE A TIME WHEN YOU WERE NOT ABLE TO PAY THE MORTGAGE OR RENT ON TIME?: NO

## 2023-06-05 SDOH — ECONOMIC STABILITY: HOUSING INSECURITY
IN THE LAST 12 MONTHS, WAS THERE A TIME WHEN YOU DID NOT HAVE A STEADY PLACE TO SLEEP OR SLEPT IN A SHELTER (INCLUDING NOW)?: NO

## 2023-06-05 SDOH — ECONOMIC STABILITY: TRANSPORTATION INSECURITY
IN THE PAST 12 MONTHS, HAS THE LACK OF TRANSPORTATION KEPT YOU FROM MEDICAL APPOINTMENTS OR FROM GETTING MEDICATIONS?: NO

## 2023-06-05 ASSESSMENT — ENCOUNTER SYMPTOMS
PHOTOPHOBIA: 0
WHEEZING: 0
COLOR CHANGE: 0
ABDOMINAL DISTENTION: 0
CHEST TIGHTNESS: 0
COUGH: 0
EYE PAIN: 0
VOMITING: 0
EYE REDNESS: 0
CONSTIPATION: 0
NAUSEA: 0
APNEA: 0
BLOOD IN STOOL: 0
SHORTNESS OF BREATH: 0
EYE ITCHING: 0
EYE DISCHARGE: 0
CHOKING: 0
DIARRHEA: 0
TROUBLE SWALLOWING: 0
SINUS PAIN: 0
SORE THROAT: 0
FACIAL SWELLING: 0
STRIDOR: 0
ABDOMINAL PAIN: 0

## 2023-06-05 ASSESSMENT — PATIENT HEALTH QUESTIONNAIRE - PHQ9
2. FEELING DOWN, DEPRESSED OR HOPELESS: 0
SUM OF ALL RESPONSES TO PHQ9 QUESTIONS 1 & 2: 0
SUM OF ALL RESPONSES TO PHQ QUESTIONS 1-9: 0
1. LITTLE INTEREST OR PLEASURE IN DOING THINGS: 0
SUM OF ALL RESPONSES TO PHQ QUESTIONS 1-9: 0

## 2023-06-05 ASSESSMENT — SOCIAL DETERMINANTS OF HEALTH (SDOH): HOW HARD IS IT FOR YOU TO PAY FOR THE VERY BASICS LIKE FOOD, HOUSING, MEDICAL CARE, AND HEATING?: NOT VERY HARD

## 2023-06-05 ASSESSMENT — LIFESTYLE VARIABLES
HOW OFTEN DO YOU HAVE A DRINK CONTAINING ALCOHOL: NEVER
HOW MANY STANDARD DRINKS CONTAINING ALCOHOL DO YOU HAVE ON A TYPICAL DAY: PATIENT DOES NOT DRINK

## 2023-06-05 NOTE — PROGRESS NOTES
Chief Complaint   Patient presents with    Medicare AW     Wellness    Pt did not bring in meds brought a list went over list  in chart pt confirmed     Pt would like for you to look at a rash on her left arm   No other c/o yan concerns    1. Have you been to the ER, urgent care clinic since your last visit? Hospitalized since your last visit? No    2. Have you seen or consulted any other health care providers outside of the [address] [place] [address] since your last visit? Include any pap smears or colon screening.  No
polyuria. Genitourinary:  Negative for difficulty urinating, dysuria, flank pain, frequency, genital sores, hematuria and urgency. Musculoskeletal:  Positive for arthralgias, gait problem and myalgias. Skin:  Negative for color change. Neurological:  Negative for dizziness, weakness, light-headedness and headaches. Hematological:  Negative for adenopathy. Does not bruise/bleed easily. Psychiatric/Behavioral:  Negative for agitation, behavioral problems, confusion, decreased concentration, dysphoric mood and suicidal ideas. The patient is not hyperactive. Objective    Vitals:    06/05/23 1050   BP: (!) 148/82   Pulse:    Resp:    Temp:    SpO2:        Physical Exam  Vitals and nursing note reviewed. Constitutional:       Appearance: Normal appearance. She is obese. HENT:      Head: Normocephalic and atraumatic. Right Ear: Tympanic membrane normal.      Left Ear: Tympanic membrane normal.      Mouth/Throat:      Mouth: Mucous membranes are moist.      Pharynx: Oropharynx is clear. Eyes:      Pupils: Pupils are equal, round, and reactive to light. Neck:      Vascular: No carotid bruit. Cardiovascular:      Rate and Rhythm: Normal rate and regular rhythm. Pulses: Normal pulses. Heart sounds: Normal heart sounds. Pulmonary:      Effort: Pulmonary effort is normal.      Breath sounds: Normal breath sounds. Abdominal:      General: Bowel sounds are normal.      Palpations: Abdomen is soft. Tenderness: There is no abdominal tenderness. There is no guarding. Musculoskeletal:      Right knee: No swelling. Decreased range of motion. Tenderness present over the medial joint line. Left knee: No swelling. Decreased range of motion. Tenderness present over the medial joint line. Right lower leg: No edema. Left lower leg: No edema. Skin:     General: Skin is warm and dry.    Neurological:      Mental Status: She is alert and oriented to person, place, and

## 2023-06-05 NOTE — PATIENT INSTRUCTIONS
have a serious illness that gets worse over time or can't be cured? What are you most afraid of that might happen? (Maybe you're afraid of having pain, losing your independence, or being kept alive by machines.)  Where would you prefer to die? (Your home? A hospital? A nursing home?)  Do you want to donate your organs when you die? Do you want certain Sabianism practices performed before you die? When should you call for help? Be sure to contact your doctor if you have any questions. Where can you learn more? Go to http://www.mcknight.com/ and enter R264 to learn more about \"Advance Directives: Care Instructions. \"  Current as of: June 16, 2022               Content Version: 13.6  © 2006-2023 Healthwise, [employer]. Care instructions adapted under license by [facility] ([facility]). If you have questions about a medical condition or this instruction, always ask your healthcare professional. [place] [address] any warranty or liability for your use of this information. Starting a Weight Loss Plan: Care Instructions  Overview     If you're thinking about losing weight, it can be hard to know where to start. Your doctor can help you set up a weight loss plan that best meets your needs. You may want to take a class on nutrition or exercise, or you could join a weight loss support group. If you have questions about how to make changes to your eating or exercise habits, ask your doctor about seeing a registered dietitian or an exercise specialist.  It can be a big challenge to lose weight. But you don't have to make huge changes at once. Make small changes, and stick with them. When those changes become habit, add a few more changes. If you don't think you're ready to make changes right now, try to pick a date in the future. Make an appointment to see your doctor to discuss whether the time is right for you to start a plan. Follow-up care is a key part of your treatment and safety.  Be

## 2023-06-06 LAB
ALBUMIN SERPL-MCNC: 4.5 G/DL (ref 3.7–4.7)
ALBUMIN/CREAT UR: 150 MG/G CREAT (ref 0–29)
ALBUMIN/GLOB SERPL: 1.3 {RATIO} (ref 1.2–2.2)
ALP SERPL-CCNC: 72 IU/L (ref 44–121)
ALT SERPL-CCNC: 19 IU/L (ref 0–32)
AST SERPL-CCNC: 14 IU/L (ref 0–40)
BILIRUB SERPL-MCNC: 0.2 MG/DL (ref 0–1.2)
BUN SERPL-MCNC: 9 MG/DL (ref 8–27)
BUN/CREAT SERPL: 13 (ref 12–28)
CALCIUM SERPL-MCNC: 9.9 MG/DL (ref 8.7–10.3)
CHLORIDE SERPL-SCNC: 99 MMOL/L (ref 96–106)
CHOLEST SERPL-MCNC: 161 MG/DL (ref 100–199)
CO2 SERPL-SCNC: 29 MMOL/L (ref 20–29)
CREAT SERPL-MCNC: 0.67 MG/DL (ref 0.57–1)
CREAT UR-MCNC: 35.4 MG/DL
EGFRCR SERPLBLD CKD-EPI 2021: 91 ML/MIN/1.73
GLOBULIN SER CALC-MCNC: 3.4 G/DL (ref 1.5–4.5)
GLUCOSE SERPL-MCNC: 192 MG/DL (ref 70–99)
HDLC SERPL-MCNC: 52 MG/DL
LDLC SERPL CALC-MCNC: 86 MG/DL (ref 0–99)
MICROALBUMIN UR-MCNC: 53 UG/ML
POTASSIUM SERPL-SCNC: 4.2 MMOL/L (ref 3.5–5.2)
PROT SERPL-MCNC: 7.9 G/DL (ref 6–8.5)
SODIUM SERPL-SCNC: 142 MMOL/L (ref 134–144)
TRIGL SERPL-MCNC: 131 MG/DL (ref 0–149)
TSH SERPL DL<=0.005 MIU/L-ACNC: 0.97 UIU/ML (ref 0.45–4.5)
URATE SERPL-MCNC: 6.8 MG/DL (ref 3.1–7.9)
VLDLC SERPL CALC-MCNC: 23 MG/DL (ref 5–40)

## 2023-06-29 ENCOUNTER — TELEPHONE (OUTPATIENT)
Facility: CLINIC | Age: 73
End: 2023-06-29

## 2023-07-05 ENCOUNTER — TELEPHONE (OUTPATIENT)
Facility: CLINIC | Age: 73
End: 2023-07-05

## 2023-07-05 NOTE — TELEPHONE ENCOUNTER
Patient called in regards to his CT scan results, His wife was called and given results previously. Advised of possible cirrhosis of liver, and referral to specialist. Patient states also Tirso Fernandez was supposed to call medication in for angela loss.

## 2023-07-17 RX ORDER — GLUCOSAM/CHON-MSM1/C/MANG/BOSW 500-416.6
TABLET ORAL
Qty: 200 EACH | Refills: 0 | Status: SHIPPED | OUTPATIENT
Start: 2023-07-17

## 2023-07-19 RX ORDER — HYDROCHLOROTHIAZIDE 25 MG/1
TABLET ORAL
Qty: 90 TABLET | Refills: 1 | Status: SHIPPED | OUTPATIENT
Start: 2023-07-19

## 2023-07-19 RX ORDER — LISINOPRIL 20 MG/1
TABLET ORAL
Qty: 90 TABLET | Refills: 1 | Status: SHIPPED | OUTPATIENT
Start: 2023-07-19

## 2023-07-19 RX ORDER — GLIPIZIDE 5 MG/1
TABLET ORAL
Qty: 360 TABLET | Refills: 1 | Status: SHIPPED | OUTPATIENT
Start: 2023-07-19

## 2023-07-19 RX ORDER — PRAVASTATIN SODIUM 40 MG
TABLET ORAL
Qty: 90 TABLET | Refills: 1 | Status: SHIPPED | OUTPATIENT
Start: 2023-07-19

## 2023-07-19 RX ORDER — ALLOPURINOL 100 MG/1
TABLET ORAL
Qty: 90 TABLET | Refills: 1 | Status: SHIPPED | OUTPATIENT
Start: 2023-07-19

## 2023-09-04 RX ORDER — DIPHENHYDRAMINE HCL 25 MG
TABLET ORAL
Qty: 1 KIT | Refills: 0 | Status: SHIPPED | OUTPATIENT
Start: 2023-09-04

## 2023-09-07 ENCOUNTER — OFFICE VISIT (OUTPATIENT)
Age: 73
End: 2023-09-07

## 2023-09-07 DIAGNOSIS — E11.9 TYPE 2 DIABETES MELLITUS WITHOUT COMPLICATION, WITHOUT LONG-TERM CURRENT USE OF INSULIN (HCC): ICD-10-CM

## 2023-09-07 DIAGNOSIS — Z01.818 PRE-OP TESTING: ICD-10-CM

## 2023-09-07 DIAGNOSIS — M25.562 LEFT KNEE PAIN, UNSPECIFIED CHRONICITY: Primary | ICD-10-CM

## 2023-09-07 DIAGNOSIS — M17.12 OSTEOARTHRITIS OF LEFT KNEE, UNSPECIFIED OSTEOARTHRITIS TYPE: ICD-10-CM

## 2023-09-07 RX ORDER — TRIAMCINOLONE ACETONIDE 40 MG/ML
40 INJECTION, SUSPENSION INTRA-ARTICULAR; INTRAMUSCULAR ONCE
Status: COMPLETED | OUTPATIENT
Start: 2023-09-07 | End: 2023-09-07

## 2023-09-07 RX ORDER — LIDOCAINE HYDROCHLORIDE 10 MG/ML
9 INJECTION, SOLUTION INFILTRATION; PERINEURAL ONCE
Status: COMPLETED | OUTPATIENT
Start: 2023-09-07 | End: 2023-09-07

## 2023-09-07 RX ORDER — CALCIUM CITRATE/VITAMIN D3 200MG-6.25
TABLET ORAL
COMMUNITY
Start: 2023-08-10

## 2023-09-07 RX ORDER — FLUTICASONE PROPIONATE AND SALMETEROL 250; 50 UG/1; UG/1
POWDER RESPIRATORY (INHALATION)
COMMUNITY
Start: 2023-07-27

## 2023-09-07 RX ORDER — FLUTICASONE PROPIONATE 50 MCG
SPRAY, SUSPENSION (ML) NASAL
COMMUNITY
Start: 2023-07-27

## 2023-09-07 RX ADMIN — LIDOCAINE HYDROCHLORIDE 9 ML: 10 INJECTION, SOLUTION INFILTRATION; PERINEURAL at 14:22

## 2023-09-07 RX ADMIN — TRIAMCINOLONE ACETONIDE 40 MG: 40 INJECTION, SUSPENSION INTRA-ARTICULAR; INTRAMUSCULAR at 14:22

## 2023-09-07 NOTE — PROGRESS NOTES
Name: Ana Paula Fine    :      [facility]  [facility]  [address], [address] [facility] [id number]  Dept: [phone]  Dept Fax: [phone]     Chief Complaint   Patient presents with    Knee Pain        There were no vitals taken for this visit. Allergies   Allergen Reactions    Aspirin Shortness Of Breath     Respiratory distress    Codeine Dizziness or Vertigo        Current Outpatient Medications   Medication Sig Dispense Refill    fluticasone (FLONASE) 50 MCG/ACT nasal spray       TRUE METRIX BLOOD GLUCOSE TEST strip       fluticasone-salmeterol (ADVAIR) 250-50 MCG/ACT AEPB diskus inhaler INHALE 1 DOSE BY MOUTH TWICE DAILY GARGLE MOUTH WITH WATER AFTER EACH USE      Blood Glucose Monitoring Suppl (TRUE METRIX AIR GLUCOSE METER) w/Device KIT USE AS DIRECTED 1 kit 0    hydroCHLOROthiazide (HYDRODIURIL) 25 MG tablet TAKE 1 TABLET EVERY DAY 90 tablet 1    pravastatin (PRAVACHOL) 40 MG tablet TAKE 1 TABLET EVERY DAY 90 tablet 1    allopurinol (ZYLOPRIM) 100 MG tablet TAKE 1 TABLET EVERY DAY 90 tablet 1    lisinopril (PRINIVIL;ZESTRIL) 20 MG tablet TAKE 1 TABLET EVERY DAY 90 tablet 1    glipiZIDE (GLUCOTROL) 5 MG tablet TAKE 2 TABLETS TWICE DAILY 360 tablet 1    TRUEplus Lancets 33G MISC USE TO CHECK GLUCOSE TWO TIMES DAILY 200 each 0    triamcinolone (KENALOG) 0.1 % ointment APPLY TO AFFECTED AREA 2 TIMES A DAY.  USE THIN LAYER 30 g 0    cloNIDine (CATAPRES) 0.2 MG tablet Take 1 tablet by mouth 3 times daily 270 tablet 0    LANTUS SOLOSTAR 100 UNIT/ML injection pen Inject 12 Units into the skin nightly 15 mL 1    dulaglutide (TRULICITY) 1.5 YY/2.7HT SC injection Inject 0.5 mLs into the skin once a week 6 mL 1    amLODIPine (NORVASC) 10 MG tablet TAKE 1 TABLET EVERY DAY 90 tablet 1    albuterol sulfate HFA (PROVENTIL;VENTOLIN;PROAIR) 108 (90 Base) MCG/ACT inhaler Inhale 2 puffs into the lungs every 4 hours as needed

## 2023-09-08 RX ORDER — INSULIN GLARGINE 100 [IU]/ML
INJECTION, SOLUTION SUBCUTANEOUS
Qty: 9 ML | Refills: 0 | Status: SHIPPED | OUTPATIENT
Start: 2023-09-08

## 2023-09-16 DIAGNOSIS — I10 ESSENTIAL (PRIMARY) HYPERTENSION: ICD-10-CM

## 2023-09-17 RX ORDER — CLONIDINE HYDROCHLORIDE 0.2 MG/1
0.2 TABLET ORAL 3 TIMES DAILY
Qty: 270 TABLET | Refills: 0 | Status: SHIPPED | OUTPATIENT
Start: 2023-09-17

## 2023-09-19 ENCOUNTER — OFFICE VISIT (OUTPATIENT)
Facility: CLINIC | Age: 73
End: 2023-09-19
Payer: MEDICARE

## 2023-09-19 VITALS
TEMPERATURE: 97.6 F | HEART RATE: 97 BPM | DIASTOLIC BLOOD PRESSURE: 85 MMHG | RESPIRATION RATE: 18 BRPM | HEIGHT: 66 IN | OXYGEN SATURATION: 97 % | BODY MASS INDEX: 37.45 KG/M2 | SYSTOLIC BLOOD PRESSURE: 138 MMHG | WEIGHT: 233 LBS

## 2023-09-19 DIAGNOSIS — H40.9 GLAUCOMA, UNSPECIFIED GLAUCOMA TYPE, UNSPECIFIED LATERALITY: ICD-10-CM

## 2023-09-19 DIAGNOSIS — I10 ESSENTIAL (PRIMARY) HYPERTENSION: ICD-10-CM

## 2023-09-19 DIAGNOSIS — I51.89 DIASTOLIC DYSFUNCTION: ICD-10-CM

## 2023-09-19 DIAGNOSIS — E11.9 TYPE 2 DIABETES MELLITUS WITHOUT COMPLICATION, WITHOUT LONG-TERM CURRENT USE OF INSULIN (HCC): Primary | ICD-10-CM

## 2023-09-19 DIAGNOSIS — J45.20 MILD INTERMITTENT ASTHMA WITHOUT COMPLICATION: ICD-10-CM

## 2023-09-19 DIAGNOSIS — M1A.9XX0 CHRONIC GOUT WITHOUT TOPHUS, UNSPECIFIED CAUSE, UNSPECIFIED SITE: ICD-10-CM

## 2023-09-19 DIAGNOSIS — E78.2 MIXED HYPERLIPIDEMIA: ICD-10-CM

## 2023-09-19 DIAGNOSIS — M15.9 PRIMARY OSTEOARTHRITIS INVOLVING MULTIPLE JOINTS: ICD-10-CM

## 2023-09-19 DIAGNOSIS — E66.01 MORBID (SEVERE) OBESITY DUE TO EXCESS CALORIES (HCC): ICD-10-CM

## 2023-09-19 LAB — HBA1C MFR BLD: 9 %

## 2023-09-19 PROCEDURE — 3046F HEMOGLOBIN A1C LEVEL >9.0%: CPT | Performed by: NURSE PRACTITIONER

## 2023-09-19 PROCEDURE — G8399 PT W/DXA RESULTS DOCUMENT: HCPCS | Performed by: NURSE PRACTITIONER

## 2023-09-19 PROCEDURE — 3079F DIAST BP 80-89 MM HG: CPT | Performed by: NURSE PRACTITIONER

## 2023-09-19 PROCEDURE — 1090F PRES/ABSN URINE INCON ASSESS: CPT | Performed by: NURSE PRACTITIONER

## 2023-09-19 PROCEDURE — G8427 DOCREV CUR MEDS BY ELIG CLIN: HCPCS | Performed by: NURSE PRACTITIONER

## 2023-09-19 PROCEDURE — 3075F SYST BP GE 130 - 139MM HG: CPT | Performed by: NURSE PRACTITIONER

## 2023-09-19 PROCEDURE — 1036F TOBACCO NON-USER: CPT | Performed by: NURSE PRACTITIONER

## 2023-09-19 PROCEDURE — 99214 OFFICE O/P EST MOD 30 MIN: CPT | Performed by: NURSE PRACTITIONER

## 2023-09-19 PROCEDURE — 1123F ACP DISCUSS/DSCN MKR DOCD: CPT | Performed by: NURSE PRACTITIONER

## 2023-09-19 PROCEDURE — 83036 HEMOGLOBIN GLYCOSYLATED A1C: CPT | Performed by: NURSE PRACTITIONER

## 2023-09-19 PROCEDURE — 3017F COLORECTAL CA SCREEN DOC REV: CPT | Performed by: NURSE PRACTITIONER

## 2023-09-19 PROCEDURE — G8417 CALC BMI ABV UP PARAM F/U: HCPCS | Performed by: NURSE PRACTITIONER

## 2023-09-19 PROCEDURE — 2022F DILAT RTA XM EVC RTNOPTHY: CPT | Performed by: NURSE PRACTITIONER

## 2023-09-19 RX ORDER — INSULIN GLARGINE 100 [IU]/ML
18 INJECTION, SOLUTION SUBCUTANEOUS NIGHTLY
Qty: 9 ML | Refills: 0 | Status: SHIPPED | OUTPATIENT
Start: 2023-09-19

## 2023-09-19 RX ORDER — DULAGLUTIDE 3 MG/.5ML
3 INJECTION, SOLUTION SUBCUTANEOUS WEEKLY
Qty: 12 ADJUSTABLE DOSE PRE-FILLED PEN SYRINGE | Refills: 1 | Status: SHIPPED | OUTPATIENT
Start: 2023-09-19

## 2023-09-19 ASSESSMENT — ENCOUNTER SYMPTOMS
BLOOD IN STOOL: 0
CONSTIPATION: 0
SHORTNESS OF BREATH: 0
ABDOMINAL PAIN: 0
STRIDOR: 0
CHEST TIGHTNESS: 0
APNEA: 0
COUGH: 0
PHOTOPHOBIA: 0
CHOKING: 0
EYE DISCHARGE: 0
TROUBLE SWALLOWING: 0
WHEEZING: 0
DIARRHEA: 0
COLOR CHANGE: 0
ABDOMINAL DISTENTION: 0
VOMITING: 0
NAUSEA: 0
EYE PAIN: 0
FACIAL SWELLING: 0
EYE ITCHING: 0
SINUS PAIN: 0
SORE THROAT: 0
EYE REDNESS: 0

## 2023-09-20 ENCOUNTER — TELEPHONE (OUTPATIENT)
Facility: CLINIC | Age: 73
End: 2023-09-20

## 2023-09-20 NOTE — TELEPHONE ENCOUNTER
[id number] -[phone] F [phone] P    Increasing trulicity -needs to be sent to above pharmacy

## 2023-10-18 ENCOUNTER — OFFICE VISIT (OUTPATIENT)
Facility: CLINIC | Age: 73
End: 2023-10-18
Payer: MEDICARE

## 2023-10-18 VITALS
SYSTOLIC BLOOD PRESSURE: 134 MMHG | BODY MASS INDEX: 38.02 KG/M2 | OXYGEN SATURATION: 98 % | WEIGHT: 236.6 LBS | TEMPERATURE: 97.6 F | HEIGHT: 66 IN | RESPIRATION RATE: 18 BRPM | DIASTOLIC BLOOD PRESSURE: 84 MMHG | HEART RATE: 103 BPM

## 2023-10-18 DIAGNOSIS — E11.65 UNCONTROLLED TYPE 2 DIABETES MELLITUS WITH HYPERGLYCEMIA (HCC): Primary | ICD-10-CM

## 2023-10-18 PROCEDURE — 1123F ACP DISCUSS/DSCN MKR DOCD: CPT | Performed by: NURSE PRACTITIONER

## 2023-10-18 PROCEDURE — 3079F DIAST BP 80-89 MM HG: CPT | Performed by: NURSE PRACTITIONER

## 2023-10-18 PROCEDURE — 2022F DILAT RTA XM EVC RTNOPTHY: CPT | Performed by: NURSE PRACTITIONER

## 2023-10-18 PROCEDURE — 1090F PRES/ABSN URINE INCON ASSESS: CPT | Performed by: NURSE PRACTITIONER

## 2023-10-18 PROCEDURE — G8484 FLU IMMUNIZE NO ADMIN: HCPCS | Performed by: NURSE PRACTITIONER

## 2023-10-18 PROCEDURE — G8399 PT W/DXA RESULTS DOCUMENT: HCPCS | Performed by: NURSE PRACTITIONER

## 2023-10-18 PROCEDURE — 3017F COLORECTAL CA SCREEN DOC REV: CPT | Performed by: NURSE PRACTITIONER

## 2023-10-18 PROCEDURE — 99214 OFFICE O/P EST MOD 30 MIN: CPT | Performed by: NURSE PRACTITIONER

## 2023-10-18 PROCEDURE — G8427 DOCREV CUR MEDS BY ELIG CLIN: HCPCS | Performed by: NURSE PRACTITIONER

## 2023-10-18 PROCEDURE — 3075F SYST BP GE 130 - 139MM HG: CPT | Performed by: NURSE PRACTITIONER

## 2023-10-18 PROCEDURE — G8417 CALC BMI ABV UP PARAM F/U: HCPCS | Performed by: NURSE PRACTITIONER

## 2023-10-18 PROCEDURE — 1036F TOBACCO NON-USER: CPT | Performed by: NURSE PRACTITIONER

## 2023-10-18 PROCEDURE — 3046F HEMOGLOBIN A1C LEVEL >9.0%: CPT | Performed by: NURSE PRACTITIONER

## 2023-10-18 ASSESSMENT — ENCOUNTER SYMPTOMS
EYE REDNESS: 0
ABDOMINAL DISTENTION: 0
APNEA: 0
ABDOMINAL PAIN: 0
NAUSEA: 0
EYE PAIN: 0
SINUS PAIN: 0
CONSTIPATION: 0
BLOOD IN STOOL: 0
COUGH: 0
WHEEZING: 0
SORE THROAT: 0
CHOKING: 0
CHEST TIGHTNESS: 0
COLOR CHANGE: 0
TROUBLE SWALLOWING: 0
SHORTNESS OF BREATH: 0
STRIDOR: 0
DIARRHEA: 0
EYE DISCHARGE: 0
PHOTOPHOBIA: 0
EYE ITCHING: 0
VOMITING: 0
FACIAL SWELLING: 0

## 2023-10-18 NOTE — PROGRESS NOTES
Chief Complaint   Patient presents with    Diabetes     Follow up     No other c/o          1. Have you been to the ER, urgent care clinic since your last visit? Hospitalized since your last visit? No    2. Have you seen or consulted any other health care providers outside of the [address] [place] [address] Avenue since your last visit? Include any pap smears or colon screening.  No
goal of < 7.0  Currently on Metformin 1000 mg twice daily, glipizide 10 mg twice daily and trulicity 3.2GD every 7 days, lantus 18 units nightly  Did not increase trulicity to 3mg due to cost  Did not bring meter but brought written home readings averaging . No hypoglycemia. States she has been trying to follow a diabetic diet along with increasing her exercise as much as possible (is limited due to pain in her left knee)      Stopped pioglitazone due to weight gain and started on trulicity previously  Glucose readings improved and also had a cortisone shot prior to last a1c. Will continue on trulicity to 1.5 mg every 7 days and continue metformin, lantus and glipizide at current dose. Continue to encourage following diabetic diet along with getting regular exercise 3-5 times weekly  Check fasting sugars and bring to next appointment. Notify provider if fasting > 250 or < 70  Continue regular eye exams  Check feet daily and notify provider immediately if wound develops          Aspects of this note have been generated using voice recognition software. Despite editing, there may be some syntax errors. Medication risks/benefits/costs/interactions/alternatives discussed with patient. Advised patient to call back or return to office if symptoms worsen/change/persist. If patient cannot reach us or should anything more severe/urgent arise she should proceed directly to the nearest emergency department. Discussed expected course/resolution/complications of diagnosis in detail with patient. Patient given a written after visit summary which includes her diagnoses, current medications and vitals. If referral was completed or labs during visit, advised to notify provider if they do not receive appointment or results within 1-2 weeks. Patient expressed understanding with the diagnosis and plan. Return in about 8 weeks (around 12/13/2023), or if symptoms worsen or fail to improve.      [address],

## 2023-11-08 RX ORDER — TRIAMCINOLONE ACETONIDE 1 MG/G
OINTMENT TOPICAL
Qty: 30 G | Refills: 10 | Status: SHIPPED | OUTPATIENT
Start: 2023-11-08

## 2023-11-15 ENCOUNTER — TRANSCRIBE ORDERS (OUTPATIENT)
Facility: HOSPITAL | Age: 73
End: 2023-11-15

## 2023-11-15 DIAGNOSIS — Z12.31 VISIT FOR SCREENING MAMMOGRAM: Primary | ICD-10-CM

## 2023-11-22 ENCOUNTER — HOSPITAL ENCOUNTER (OUTPATIENT)
Facility: HOSPITAL | Age: 73
Discharge: HOME | End: 2023-11-25
Payer: MEDICARE

## 2023-11-22 DIAGNOSIS — Z12.31 VISIT FOR SCREENING MAMMOGRAM: ICD-10-CM

## 2023-11-22 PROCEDURE — 77063 BREAST TOMOSYNTHESIS BI: CPT

## 2023-11-29 RX ORDER — CALCIUM CITRATE/VITAMIN D3 200MG-6.25
TABLET ORAL
Qty: 200 STRIP | Refills: 3 | Status: SHIPPED | OUTPATIENT
Start: 2023-11-29

## 2023-12-14 DIAGNOSIS — E11.9 TYPE 2 DIABETES MELLITUS WITHOUT COMPLICATION, WITHOUT LONG-TERM CURRENT USE OF INSULIN (HCC): ICD-10-CM

## 2023-12-14 RX ORDER — INSULIN GLARGINE 100 [IU]/ML
18 INJECTION, SOLUTION SUBCUTANEOUS NIGHTLY
Qty: 15 ML | Refills: 3 | Status: SHIPPED | OUTPATIENT
Start: 2023-12-14

## 2024-01-08 ENCOUNTER — OFFICE VISIT (OUTPATIENT)
Facility: CLINIC | Age: 74
End: 2024-01-08
Payer: MEDICARE

## 2024-01-08 VITALS
WEIGHT: 236.8 LBS | OXYGEN SATURATION: 98 % | DIASTOLIC BLOOD PRESSURE: 85 MMHG | SYSTOLIC BLOOD PRESSURE: 139 MMHG | TEMPERATURE: 98.1 F | HEART RATE: 102 BPM | BODY MASS INDEX: 38.06 KG/M2 | HEIGHT: 66 IN | RESPIRATION RATE: 20 BRPM

## 2024-01-08 DIAGNOSIS — M15.9 PRIMARY OSTEOARTHRITIS INVOLVING MULTIPLE JOINTS: ICD-10-CM

## 2024-01-08 DIAGNOSIS — J45.20 MILD INTERMITTENT ASTHMA WITHOUT COMPLICATION: ICD-10-CM

## 2024-01-08 DIAGNOSIS — E78.2 MIXED HYPERLIPIDEMIA: ICD-10-CM

## 2024-01-08 DIAGNOSIS — Z12.11 SCREENING FOR MALIGNANT NEOPLASM OF COLON: ICD-10-CM

## 2024-01-08 DIAGNOSIS — H40.9 GLAUCOMA, UNSPECIFIED GLAUCOMA TYPE, UNSPECIFIED LATERALITY: ICD-10-CM

## 2024-01-08 DIAGNOSIS — M1A.9XX0 CHRONIC GOUT WITHOUT TOPHUS, UNSPECIFIED CAUSE, UNSPECIFIED SITE: ICD-10-CM

## 2024-01-08 DIAGNOSIS — E66.01 MORBID (SEVERE) OBESITY DUE TO EXCESS CALORIES (HCC): ICD-10-CM

## 2024-01-08 DIAGNOSIS — I10 ESSENTIAL (PRIMARY) HYPERTENSION: ICD-10-CM

## 2024-01-08 DIAGNOSIS — I51.89 DIASTOLIC DYSFUNCTION: ICD-10-CM

## 2024-01-08 DIAGNOSIS — E11.9 TYPE 2 DIABETES MELLITUS WITHOUT COMPLICATION, WITHOUT LONG-TERM CURRENT USE OF INSULIN (HCC): Primary | ICD-10-CM

## 2024-01-08 LAB — HBA1C MFR BLD: 8.2 %

## 2024-01-08 PROCEDURE — 3017F COLORECTAL CA SCREEN DOC REV: CPT | Performed by: NURSE PRACTITIONER

## 2024-01-08 PROCEDURE — 3075F SYST BP GE 130 - 139MM HG: CPT | Performed by: NURSE PRACTITIONER

## 2024-01-08 PROCEDURE — 3079F DIAST BP 80-89 MM HG: CPT | Performed by: NURSE PRACTITIONER

## 2024-01-08 PROCEDURE — 2022F DILAT RTA XM EVC RTNOPTHY: CPT | Performed by: NURSE PRACTITIONER

## 2024-01-08 PROCEDURE — 1123F ACP DISCUSS/DSCN MKR DOCD: CPT | Performed by: NURSE PRACTITIONER

## 2024-01-08 PROCEDURE — G8484 FLU IMMUNIZE NO ADMIN: HCPCS | Performed by: NURSE PRACTITIONER

## 2024-01-08 PROCEDURE — 83036 HEMOGLOBIN GLYCOSYLATED A1C: CPT | Performed by: NURSE PRACTITIONER

## 2024-01-08 PROCEDURE — 1036F TOBACCO NON-USER: CPT | Performed by: NURSE PRACTITIONER

## 2024-01-08 PROCEDURE — G8417 CALC BMI ABV UP PARAM F/U: HCPCS | Performed by: NURSE PRACTITIONER

## 2024-01-08 PROCEDURE — 3046F HEMOGLOBIN A1C LEVEL >9.0%: CPT | Performed by: NURSE PRACTITIONER

## 2024-01-08 PROCEDURE — 99214 OFFICE O/P EST MOD 30 MIN: CPT | Performed by: NURSE PRACTITIONER

## 2024-01-08 PROCEDURE — 1090F PRES/ABSN URINE INCON ASSESS: CPT | Performed by: NURSE PRACTITIONER

## 2024-01-08 PROCEDURE — G8427 DOCREV CUR MEDS BY ELIG CLIN: HCPCS | Performed by: NURSE PRACTITIONER

## 2024-01-08 PROCEDURE — G8399 PT W/DXA RESULTS DOCUMENT: HCPCS | Performed by: NURSE PRACTITIONER

## 2024-01-08 ASSESSMENT — PATIENT HEALTH QUESTIONNAIRE - PHQ9
SUM OF ALL RESPONSES TO PHQ QUESTIONS 1-9: 0
SUM OF ALL RESPONSES TO PHQ9 QUESTIONS 1 & 2: 0
1. LITTLE INTEREST OR PLEASURE IN DOING THINGS: 0
SUM OF ALL RESPONSES TO PHQ QUESTIONS 1-9: 0

## 2024-01-08 NOTE — PROGRESS NOTES
Subjective  Chief Complaint   Patient presents with    Diabetes     HPI:  Beata Bhatia is a 73 y.o. female  here today for follow-up of chronic conditions with PMH of asthma, hypertension, hyperlipidemia, glaucoma, gout, type 2 diabetes, diastolic dysfunction,obesity and arthritis.  Reports taking medicine as noted in chart. Following with orthopedic. No changes or new concerns. Fasting glucose has been under 200 when checking but did not bring home readings.    Past Medical History:   Diagnosis Date    Acute sinusitis 11/13/2020    Arthritis     COVID-19 11/22/2020    Cyst of ovary 11/13/2020    Essential (primary) hypertension 9/26/2017    Glaucoma 11/30/2018    Gout 8/9/2019    Hypercholesterolemia 11/13/2020    Impacted cerumen 11/13/2020    Menopause     Mild intermittent asthma, uncomplicated 2/10/2020    Mixed hyperlipidemia due to type 2 diabetes mellitus (HCC) 9/26/2017    Painless rectal bleeding 11/30/2018    Type 2 diabetes mellitus without complications (HCC) 9/26/2017        Past Surgical History:   Procedure Laterality Date    COLONOSCOPY      HERNIA REPAIR  Unknown date    KNEE ARTHROSCOPY Right 01/01/2013    OTHER SURGICAL HISTORY  05/16/2017    Eye surgery procedure    PARTIAL HYSTERECTOMY (CERVIX NOT REMOVED)  Unknown date        Family History   Problem Relation Age of Onset    Osteoarthritis Mother     Hypertension Other     Heart Disease Other     Diabetes Other         Social History     Socioeconomic History    Marital status:      Spouse name: None    Number of children: None    Years of education: None    Highest education level: None   Tobacco Use    Smoking status: Former    Smokeless tobacco: Never    Tobacco comments:     Quit smoking: At least 30 years ago.   Substance and Sexual Activity    Alcohol use: Never    Drug use: Never    Sexual activity: Not Currently     Social Determinants of Health     Financial Resource Strain: Low Risk  (6/5/2023)    Overall Financial Resource

## 2024-01-08 NOTE — PROGRESS NOTES
Chief Complaint   Patient presents with    Diabetes     Follow up     Pt did not bring meds, went over list, pt confirmed   No other c/o        1. Have you been to the ER, urgent care clinic since your last visit?  Hospitalized since your last visit?No    2. Have you seen or consulted any other health care providers outside of the [facility] since your last visit?  Include any pap smears or colon screening. No

## 2024-01-10 RX ORDER — GLUCOSAM/CHON-MSM1/C/MANG/BOSW 500-416.6
TABLET ORAL
Qty: 200 EACH | Refills: 3 | Status: SHIPPED | OUTPATIENT
Start: 2024-01-10

## 2024-01-11 ENCOUNTER — TELEPHONE (OUTPATIENT)
Facility: CLINIC | Age: 74
End: 2024-01-11

## 2024-01-11 NOTE — TELEPHONE ENCOUNTER
Pt is requesting a call back in ref to a shot she is suppose to get-she stated it was written down for her but she lost the paper. Please review and advise pt [facility]

## 2024-02-26 DIAGNOSIS — I10 ESSENTIAL (PRIMARY) HYPERTENSION: ICD-10-CM

## 2024-02-26 RX ORDER — CLONIDINE HYDROCHLORIDE 0.2 MG/1
0.2 TABLET ORAL 3 TIMES DAILY
Qty: 270 TABLET | Refills: 1 | Status: SHIPPED | OUTPATIENT
Start: 2024-02-26

## 2024-02-26 RX ORDER — AMLODIPINE BESYLATE 10 MG/1
TABLET ORAL
Qty: 90 TABLET | Refills: 1 | Status: SHIPPED | OUTPATIENT
Start: 2024-02-26

## 2024-03-19 RX ORDER — HYDROCHLOROTHIAZIDE 25 MG/1
TABLET ORAL
Qty: 90 TABLET | Refills: 1 | Status: SHIPPED | OUTPATIENT
Start: 2024-03-19

## 2024-03-19 RX ORDER — GLIPIZIDE 5 MG/1
TABLET ORAL
Qty: 360 TABLET | Refills: 1 | Status: SHIPPED | OUTPATIENT
Start: 2024-03-19

## 2024-03-19 RX ORDER — PRAVASTATIN SODIUM 40 MG
TABLET ORAL
Qty: 90 TABLET | Refills: 1 | Status: SHIPPED | OUTPATIENT
Start: 2024-03-19

## 2024-03-19 RX ORDER — LISINOPRIL 20 MG/1
TABLET ORAL
Qty: 90 TABLET | Refills: 1 | Status: SHIPPED | OUTPATIENT
Start: 2024-03-19

## 2024-03-19 RX ORDER — ALLOPURINOL 100 MG/1
TABLET ORAL
Qty: 90 TABLET | Refills: 1 | Status: SHIPPED | OUTPATIENT
Start: 2024-03-19

## 2024-04-08 ENCOUNTER — TELEPHONE (OUTPATIENT)
Facility: CLINIC | Age: 74
End: 2024-04-08

## 2024-04-08 ENCOUNTER — OFFICE VISIT (OUTPATIENT)
Facility: CLINIC | Age: 74
End: 2024-04-08
Payer: MEDICARE

## 2024-04-08 VITALS
HEIGHT: 66 IN | TEMPERATURE: 98.1 F | RESPIRATION RATE: 18 BRPM | WEIGHT: 237 LBS | BODY MASS INDEX: 38.09 KG/M2 | OXYGEN SATURATION: 98 % | HEART RATE: 87 BPM | SYSTOLIC BLOOD PRESSURE: 139 MMHG | DIASTOLIC BLOOD PRESSURE: 74 MMHG

## 2024-04-08 DIAGNOSIS — I51.89 DIASTOLIC DYSFUNCTION: ICD-10-CM

## 2024-04-08 DIAGNOSIS — J30.1 SEASONAL ALLERGIC RHINITIS DUE TO POLLEN: ICD-10-CM

## 2024-04-08 DIAGNOSIS — E66.01 MORBID (SEVERE) OBESITY DUE TO EXCESS CALORIES (HCC): ICD-10-CM

## 2024-04-08 DIAGNOSIS — M1A.9XX0 CHRONIC GOUT WITHOUT TOPHUS, UNSPECIFIED CAUSE, UNSPECIFIED SITE: ICD-10-CM

## 2024-04-08 DIAGNOSIS — E11.9 TYPE 2 DIABETES MELLITUS WITHOUT COMPLICATION, WITHOUT LONG-TERM CURRENT USE OF INSULIN (HCC): Primary | ICD-10-CM

## 2024-04-08 DIAGNOSIS — Z12.11 SCREENING FOR MALIGNANT NEOPLASM OF COLON: ICD-10-CM

## 2024-04-08 DIAGNOSIS — I10 ESSENTIAL (PRIMARY) HYPERTENSION: ICD-10-CM

## 2024-04-08 DIAGNOSIS — E78.2 MIXED HYPERLIPIDEMIA: ICD-10-CM

## 2024-04-08 DIAGNOSIS — J45.20 MILD INTERMITTENT ASTHMA WITHOUT COMPLICATION: ICD-10-CM

## 2024-04-08 DIAGNOSIS — M15.9 PRIMARY OSTEOARTHRITIS INVOLVING MULTIPLE JOINTS: ICD-10-CM

## 2024-04-08 DIAGNOSIS — H40.9 GLAUCOMA, UNSPECIFIED GLAUCOMA TYPE, UNSPECIFIED LATERALITY: ICD-10-CM

## 2024-04-08 LAB — HBA1C MFR BLD: 9 %

## 2024-04-08 PROCEDURE — G8427 DOCREV CUR MEDS BY ELIG CLIN: HCPCS | Performed by: NURSE PRACTITIONER

## 2024-04-08 PROCEDURE — 3075F SYST BP GE 130 - 139MM HG: CPT | Performed by: NURSE PRACTITIONER

## 2024-04-08 PROCEDURE — G8399 PT W/DXA RESULTS DOCUMENT: HCPCS | Performed by: NURSE PRACTITIONER

## 2024-04-08 PROCEDURE — 99214 OFFICE O/P EST MOD 30 MIN: CPT | Performed by: NURSE PRACTITIONER

## 2024-04-08 PROCEDURE — 3078F DIAST BP <80 MM HG: CPT | Performed by: NURSE PRACTITIONER

## 2024-04-08 PROCEDURE — 83036 HEMOGLOBIN GLYCOSYLATED A1C: CPT | Performed by: NURSE PRACTITIONER

## 2024-04-08 PROCEDURE — 1090F PRES/ABSN URINE INCON ASSESS: CPT | Performed by: NURSE PRACTITIONER

## 2024-04-08 PROCEDURE — G8417 CALC BMI ABV UP PARAM F/U: HCPCS | Performed by: NURSE PRACTITIONER

## 2024-04-08 PROCEDURE — 1036F TOBACCO NON-USER: CPT | Performed by: NURSE PRACTITIONER

## 2024-04-08 PROCEDURE — 1123F ACP DISCUSS/DSCN MKR DOCD: CPT | Performed by: NURSE PRACTITIONER

## 2024-04-08 RX ORDER — FEXOFENADINE HCL 180 MG/1
180 TABLET ORAL DAILY
Qty: 30 TABLET | Refills: 3 | Status: SHIPPED | OUTPATIENT
Start: 2024-04-08 | End: 2024-05-08

## 2024-04-08 RX ORDER — TIRZEPATIDE 7.5 MG/.5ML
7.5 INJECTION, SOLUTION SUBCUTANEOUS WEEKLY
Qty: 6 ML | Refills: 1 | Status: SHIPPED | OUTPATIENT
Start: 2024-04-08 | End: 2024-04-08 | Stop reason: SDUPTHER

## 2024-04-08 RX ORDER — TIRZEPATIDE 7.5 MG/.5ML
7.5 INJECTION, SOLUTION SUBCUTANEOUS WEEKLY
Qty: 6 ML | Refills: 1 | Status: SHIPPED | OUTPATIENT
Start: 2024-04-08 | End: 2024-07-07

## 2024-04-08 RX ORDER — INSULIN GLARGINE 100 [IU]/ML
22 INJECTION, SOLUTION SUBCUTANEOUS NIGHTLY
Qty: 22 ML | Refills: 0 | Status: SHIPPED | OUTPATIENT
Start: 2024-04-08 | End: 2024-07-07

## 2024-04-08 ASSESSMENT — ENCOUNTER SYMPTOMS
SORE THROAT: 0
BLOOD IN STOOL: 0
VOMITING: 0
SHORTNESS OF BREATH: 0
COLOR CHANGE: 0
EYE DISCHARGE: 0
CHEST TIGHTNESS: 0
APNEA: 0
EYE PAIN: 0
CONSTIPATION: 0
STRIDOR: 0
PHOTOPHOBIA: 0
ABDOMINAL PAIN: 0
EYE REDNESS: 0
COUGH: 0
SINUS PAIN: 0
ABDOMINAL DISTENTION: 0
TROUBLE SWALLOWING: 0
DIARRHEA: 0
FACIAL SWELLING: 0
EYE ITCHING: 1
NAUSEA: 0
WHEEZING: 0
CHOKING: 0

## 2024-04-08 NOTE — PROGRESS NOTES
Chief Complaint   Patient presents with    Diabetes     Follow up     Pt brought in meds went over list in chart, pt confirmed     Pt would like recommendation for OTC allergy medication     \"Have you been to the ER, urgent care clinic since your last visit?  Hospitalized since your last visit?\"    NO    “Have you seen or consulted any other health care providers outside of [facility] since your last visit?”    NO            Click Here for Release of Records Request        
knee)       Stopped pioglitazone due to weight gain    Not meeting A1c goal with Trulicity 3mg every 7 days and going to switch to mounjaro 7.5mg every 7 days.   Checked with insurance and this is affordable for patient.   Continue metformin and glipizide at current dose.   Increase lantus to 22 units nightly.   Continue to encourage following diabetic diet along with getting regular exercise 3-5 times weekly   Check  glucose twice daily and bring to next appointment. Notify provider if fasting > 250 or < 70.   Continue regular eye exams   Check feet daily and notify provider immediately if wound develops  - AMB POC HEMOGLOBIN A1C  - Tirzepatide (MOUNJARO) 7.5 MG/0.5ML SOPN SC injection; Inject 0.5 mLs into the skin once a week  Dispense: 6 mL; Refill: 1  - LANTUS SOLOSTAR 100 UNIT/ML injection pen; Inject 22 Units into the skin nightly  Dispense: 22 mL; Refill: 0  - Lipid Panel  - Thyroid Cascade Profile  - Comprehensive Metabolic Panel  - Microalbumin / Creatinine Urine Ratio  - CBC with Auto Differential  - Urinalysis with Microscopic      2. Essential (primary) hypertension   Blood pressure is at goal    Continue clonidine 0.2mg TID, lisinopril 20mg daily, HCTZ 25mg daily and amlodipine 10mg daily   Check readings at home and notify provider if > 140/90      3. Mixed hyperlipidemia due to type 2 diabetes mellitus (HCC)     Lab Results   Component Value Date    LDLCALC 86 06/05/2023    LDLDIRECT 90 11/23/2020       Essentially stable   Continues on pravastatin 40 mg daily as directed      4. Mild intermittent asthma, uncomplicated   Symptoms stable   On Wixela and albuterol as needed      5. Osteoarthritis involving multiple joints on both sides of body   She has pain in multiple joints including shoulders and knees  Told she will knee left knee replacement but has been receiving cortisone injections. Last 9/2023.    We will continue on Tylenol and topical diclofenac as needed   Encourage range of

## 2024-04-09 LAB
ALBUMIN SERPL-MCNC: 4.4 G/DL (ref 3.8–4.8)
ALBUMIN/CREAT UR: 232 MG/G CREAT (ref 0–29)
ALBUMIN/GLOB SERPL: 1.4 {RATIO} (ref 1.2–2.2)
ALP SERPL-CCNC: 82 IU/L (ref 44–121)
ALT SERPL-CCNC: 19 IU/L (ref 0–32)
APPEARANCE UR: CLEAR
AST SERPL-CCNC: 12 IU/L (ref 0–40)
BACTERIA #/AREA URNS HPF: ABNORMAL /[HPF]
BASOPHILS # BLD AUTO: 0 X10E3/UL (ref 0–0.2)
BASOPHILS NFR BLD AUTO: 0 %
BILIRUB SERPL-MCNC: 0.2 MG/DL (ref 0–1.2)
BILIRUB UR QL STRIP: NEGATIVE
BUN SERPL-MCNC: 10 MG/DL (ref 8–27)
BUN/CREAT SERPL: 13 (ref 12–28)
CALCIUM SERPL-MCNC: 10.3 MG/DL (ref 8.7–10.3)
CASTS URNS QL MICRO: ABNORMAL /LPF
CHLORIDE SERPL-SCNC: 98 MMOL/L (ref 96–106)
CHOLEST SERPL-MCNC: 171 MG/DL (ref 100–199)
CO2 SERPL-SCNC: 25 MMOL/L (ref 20–29)
COLOR UR: YELLOW
CREAT SERPL-MCNC: 0.78 MG/DL (ref 0.57–1)
CREAT UR-MCNC: 129.1 MG/DL
CRYSTALS URNS MICRO: ABNORMAL
EGFRCR SERPLBLD CKD-EPI 2021: 79 ML/MIN/1.73
EOSINOPHIL # BLD AUTO: 0.1 X10E3/UL (ref 0–0.4)
EOSINOPHIL NFR BLD AUTO: 1 %
EPI CELLS #/AREA URNS HPF: ABNORMAL /HPF (ref 0–10)
ERYTHROCYTE [DISTWIDTH] IN BLOOD BY AUTOMATED COUNT: 13.1 % (ref 11.7–15.4)
GLOBULIN SER CALC-MCNC: 3.1 G/DL (ref 1.5–4.5)
GLUCOSE SERPL-MCNC: 180 MG/DL (ref 70–99)
GLUCOSE UR QL STRIP: NEGATIVE
HCT VFR BLD AUTO: 37.5 % (ref 34–46.6)
HDLC SERPL-MCNC: 55 MG/DL
HGB BLD-MCNC: 12.4 G/DL (ref 11.1–15.9)
HGB UR QL STRIP: ABNORMAL
IMM GRANULOCYTES # BLD AUTO: 0 X10E3/UL (ref 0–0.1)
IMM GRANULOCYTES NFR BLD AUTO: 0 %
KETONES UR QL STRIP: NEGATIVE
LDLC SERPL CALC-MCNC: 96 MG/DL (ref 0–99)
LEUKOCYTE ESTERASE UR QL STRIP: ABNORMAL
LYMPHOCYTES # BLD AUTO: 1.9 X10E3/UL (ref 0.7–3.1)
LYMPHOCYTES NFR BLD AUTO: 32 %
MCH RBC QN AUTO: 29.2 PG (ref 26.6–33)
MCHC RBC AUTO-ENTMCNC: 33.1 G/DL (ref 31.5–35.7)
MCV RBC AUTO: 88 FL (ref 79–97)
MICRO URNS: ABNORMAL
MICROALBUMIN UR-MCNC: 299.2 UG/ML
MONOCYTES # BLD AUTO: 0.5 X10E3/UL (ref 0.1–0.9)
MONOCYTES NFR BLD AUTO: 9 %
NEUTROPHILS # BLD AUTO: 3.4 X10E3/UL (ref 1.4–7)
NEUTROPHILS NFR BLD AUTO: 58 %
NITRITE UR QL STRIP: NEGATIVE
PH UR STRIP: 5 [PH] (ref 5–7.5)
PLATELET # BLD AUTO: 298 X10E3/UL (ref 150–450)
POTASSIUM SERPL-SCNC: 4.4 MMOL/L (ref 3.5–5.2)
PROT SERPL-MCNC: 7.5 G/DL (ref 6–8.5)
PROT UR QL STRIP: ABNORMAL
RBC # BLD AUTO: 4.25 X10E6/UL (ref 3.77–5.28)
RBC #/AREA URNS HPF: ABNORMAL /HPF (ref 0–2)
SODIUM SERPL-SCNC: 140 MMOL/L (ref 134–144)
SP GR UR STRIP: 1.02 (ref 1–1.03)
TRIGL SERPL-MCNC: 114 MG/DL (ref 0–149)
TSH SERPL DL<=0.005 MIU/L-ACNC: 1.25 UIU/ML (ref 0.45–4.5)
UNIDENT CRYS URNS QL MICRO: PRESENT
URATE SERPL-MCNC: 6.4 MG/DL (ref 3.1–7.9)
UROBILINOGEN UR STRIP-MCNC: 0.2 MG/DL (ref 0.2–1)
VLDLC SERPL CALC-MCNC: 20 MG/DL (ref 5–40)
WBC # BLD AUTO: 5.9 X10E3/UL (ref 3.4–10.8)
WBC #/AREA URNS HPF: ABNORMAL /HPF (ref 0–5)

## 2024-04-17 ENCOUNTER — TELEPHONE (OUTPATIENT)
Facility: CLINIC | Age: 74
End: 2024-04-17

## 2024-04-18 ENCOUNTER — TELEPHONE (OUTPATIENT)
Age: 74
End: 2024-04-18

## 2024-04-18 DIAGNOSIS — Z12.11 ENCOUNTER FOR SCREENING FOR MALIGNANT NEOPLASM OF COLON: Primary | ICD-10-CM

## 2024-04-18 NOTE — TELEPHONE ENCOUNTER
I spoke with david, and reviewed CS questionnaire. She is ok to schedule. She choose Colonoscopy screening is sched for 5/8/24 at 9:00am  [employer] AUTH PENDING

## 2024-04-19 RX ORDER — POLYETHYLENE GLYCOL 3350 17 G/17G
POWDER, FOR SOLUTION ORAL
Qty: 510 G | Refills: 0 | Status: SHIPPED | OUTPATIENT
Start: 2024-04-19

## 2024-04-24 ENCOUNTER — PREP FOR PROCEDURE (OUTPATIENT)
Age: 74
End: 2024-04-24

## 2024-04-24 DIAGNOSIS — Z12.12 ENCOUNTER FOR COLORECTAL CANCER SCREENING: ICD-10-CM

## 2024-04-24 DIAGNOSIS — Z12.11 ENCOUNTER FOR COLORECTAL CANCER SCREENING: ICD-10-CM

## 2024-05-08 ENCOUNTER — ANESTHESIA EVENT (OUTPATIENT)
Facility: HOSPITAL | Age: 74
End: 2024-05-08
Payer: MEDICARE

## 2024-05-08 ENCOUNTER — ANESTHESIA (OUTPATIENT)
Facility: HOSPITAL | Age: 74
End: 2024-05-08
Payer: MEDICARE

## 2024-05-08 ENCOUNTER — HOSPITAL ENCOUNTER (OUTPATIENT)
Facility: HOSPITAL | Age: 74
Setting detail: OUTPATIENT SURGERY
Discharge: HOME | End: 2024-05-08
Attending: INTERNAL MEDICINE | Admitting: INTERNAL MEDICINE
Payer: MEDICARE

## 2024-05-08 VITALS
HEIGHT: 66 IN | RESPIRATION RATE: 18 BRPM | BODY MASS INDEX: 37.88 KG/M2 | HEART RATE: 68 BPM | OXYGEN SATURATION: 95 % | SYSTOLIC BLOOD PRESSURE: 138 MMHG | WEIGHT: 235.7 LBS | TEMPERATURE: 97.1 F | DIASTOLIC BLOOD PRESSURE: 79 MMHG

## 2024-05-08 LAB
GLUCOSE BLD STRIP.AUTO-MCNC: 169 MG/DL (ref 65–100)
PERFORMED BY:: ABNORMAL

## 2024-05-08 PROCEDURE — 2500000003 HC RX 250 WO HCPCS: Performed by: NURSE ANESTHETIST, CERTIFIED REGISTERED

## 2024-05-08 PROCEDURE — 88305 TISSUE EXAM BY PATHOLOGIST: CPT

## 2024-05-08 PROCEDURE — 2580000003 HC RX 258: Performed by: INTERNAL MEDICINE

## 2024-05-08 PROCEDURE — 6360000002 HC RX W HCPCS: Performed by: NURSE ANESTHETIST, CERTIFIED REGISTERED

## 2024-05-08 PROCEDURE — 3700000001 HC ADD 15 MINUTES (ANESTHESIA): Performed by: INTERNAL MEDICINE

## 2024-05-08 PROCEDURE — 3600007502: Performed by: INTERNAL MEDICINE

## 2024-05-08 PROCEDURE — 7100000011 HC PHASE II RECOVERY - ADDTL 15 MIN: Performed by: INTERNAL MEDICINE

## 2024-05-08 PROCEDURE — 82962 GLUCOSE BLOOD TEST: CPT

## 2024-05-08 PROCEDURE — 7100000010 HC PHASE II RECOVERY - FIRST 15 MIN: Performed by: INTERNAL MEDICINE

## 2024-05-08 PROCEDURE — 3600007512: Performed by: INTERNAL MEDICINE

## 2024-05-08 PROCEDURE — 2709999900 HC NON-CHARGEABLE SUPPLY: Performed by: INTERNAL MEDICINE

## 2024-05-08 PROCEDURE — 3700000000 HC ANESTHESIA ATTENDED CARE: Performed by: INTERNAL MEDICINE

## 2024-05-08 RX ORDER — SODIUM CHLORIDE 9 MG/ML
25 INJECTION, SOLUTION INTRAVENOUS PRN
Status: DISCONTINUED | OUTPATIENT
Start: 2024-05-08 | End: 2024-05-08 | Stop reason: HOSPADM

## 2024-05-08 RX ORDER — GLYCOPYRROLATE 0.2 MG/ML
INJECTION INTRAMUSCULAR; INTRAVENOUS PRN
Status: DISCONTINUED | OUTPATIENT
Start: 2024-05-08 | End: 2024-05-08 | Stop reason: SDUPTHER

## 2024-05-08 RX ORDER — LIDOCAINE HYDROCHLORIDE 20 MG/ML
INJECTION, SOLUTION EPIDURAL; INFILTRATION; INTRACAUDAL; PERINEURAL PRN
Status: DISCONTINUED | OUTPATIENT
Start: 2024-05-08 | End: 2024-05-08 | Stop reason: SDUPTHER

## 2024-05-08 RX ORDER — SODIUM CHLORIDE 9 MG/ML
INJECTION, SOLUTION INTRAVENOUS CONTINUOUS
Status: DISCONTINUED | OUTPATIENT
Start: 2024-05-08 | End: 2024-05-08 | Stop reason: HOSPADM

## 2024-05-08 RX ORDER — PROPOFOL 10 MG/ML
INJECTION, EMULSION INTRAVENOUS PRN
Status: DISCONTINUED | OUTPATIENT
Start: 2024-05-08 | End: 2024-05-08 | Stop reason: SDUPTHER

## 2024-05-08 RX ADMIN — LIDOCAINE HYDROCHLORIDE 60 MG: 20 INJECTION, SOLUTION EPIDURAL; INFILTRATION; INTRACAUDAL; PERINEURAL at 10:01

## 2024-05-08 RX ADMIN — SODIUM CHLORIDE: 9 INJECTION, SOLUTION INTRAVENOUS at 08:36

## 2024-05-08 RX ADMIN — GLYCOPYRROLATE 0.2 MG: 0.2 INJECTION INTRAMUSCULAR; INTRAVENOUS at 10:01

## 2024-05-08 RX ADMIN — PROPOFOL 50 MG: 10 INJECTION, EMULSION INTRAVENOUS at 10:14

## 2024-05-08 RX ADMIN — PROPOFOL 50 MG: 10 INJECTION, EMULSION INTRAVENOUS at 09:58

## 2024-05-08 RX ADMIN — PROPOFOL 50 MG: 10 INJECTION, EMULSION INTRAVENOUS at 10:04

## 2024-05-08 ASSESSMENT — PAIN - FUNCTIONAL ASSESSMENT
PAIN_FUNCTIONAL_ASSESSMENT: 0-10

## 2024-05-08 NOTE — INTERVAL H&P NOTE
Update History & Physical    The patient's History and Physical of May 8, 2024 was reviewed with the patient and I examined the patient. There was no change. The surgical site was confirmed by the patient and me.     Plan: The risks, benefits, expected outcome, and alternative to the recommended procedure have been discussed with the patient. Patient understands and wants to proceed with the procedure.     Electronically signed by Harris Mccormick Jr, MD on 5/8/2024 at 9:45 AM

## 2024-05-08 NOTE — DISCHARGE INSTRUCTIONS
For the next 12 hours you should not:   1. drive   2. drink alcohol   3. operate any machinery   4. engage in activities that require mental sharpness or manual dexterity such as     cooking   5. take any drugs other than those prescribed by a physician   6. make any legal or financial decisions    Call your doctor's office immediately, if there is is anything unusual:   1. increased and continuing rectal bleeding   2. fever   3. Unusual abdominal pain    Take it easy today and resume normal activity tomorrow.It is common to have gas and mild bloating for a few hours. Pain is NOT normal. You may be groggy off and on for a few hours.    Resume previous diet.        Harris Mccormick Jr, MD  5/8/2024  10:27 AM

## 2024-05-08 NOTE — ANESTHESIA PRE PROCEDURE
Department of Anesthesiology  Preprocedure Note       Name:  Beata Bhatia   Age:  74 y.o.  :  1950                                          MRN:  [id number]         Date:  2024      Surgeon: Surgeon(s):  Harris Mccormick Jr., MD    Procedure: Procedure(s):  COLONOSCOPY DIAGNOSTIC    Medications prior to admission:   Prior to Admission medications    Medication Sig Start Date End Date Taking? Authorizing Provider   polyethylene glycol (GLYCOLAX) 17 GM/SCOOP powder USE AS DIRECTED BY PHYSICIAN FOR COLONOSCOPY PREP 24   Harris Mccormick Jr., MD   Tirzepatide (MOUNJARO) 7.5 MG/0.5ML SOPN SC injection Inject 0.5 mLs into the skin once a week  Patient not taking: Reported on 2024  Danni Jaime APRN - NP   LANTUS SOLOSTAR 100 UNIT/ML injection pen Inject 22 Units into the skin nightly 24  Danni Jaime APRN - NP   fexofenadine (ALLEGRA) 180 MG tablet Take 1 tablet by mouth daily  Patient not taking: Reported on 2024  Danni Jaime APRN - NP   allopurinol (ZYLOPRIM) 100 MG tablet TAKE 1 TABLET EVERY DAY 3/19/24   Danni Jaime APRN - NP   hydroCHLOROthiazide (HYDRODIURIL) 25 MG tablet TAKE 1 TABLET EVERY DAY 3/19/24   Danni Jaime APRN - NP   pravastatin (PRAVACHOL) 40 MG tablet TAKE 1 TABLET EVERY DAY 3/19/24   Danni Jaime APRN - NP   lisinopril (PRINIVIL;ZESTRIL) 20 MG tablet TAKE 1 TABLET EVERY DAY 3/19/24   Danni Jaime APRN - NP   glipiZIDE (GLUCOTROL) 5 MG tablet TAKE 2 TABLETS TWICE DAILY 3/19/24   Danni Jaime APRN - NP   amLODIPine (NORVASC) 10 MG tablet TAKE 1 TABLET EVERY DAY 24   Danni Jaime APRN - NP   cloNIDine (CATAPRES) 0.2 MG tablet TAKE 1 TABLET THREE TIMES DAILY 24   Danni Jaime APRN - NP   metFORMIN (GLUCOPHAGE) 1000 MG tablet TAKE 1 TABLET TWICE DAILY WITH MEALS 24   Danni Jaime APRN - NP   TRUEplus Lancets 33G

## 2024-05-08 NOTE — ANESTHESIA POSTPROCEDURE EVALUATION
Department of Anesthesiology  Postprocedure Note    Patient: Beata Bhatia  MRN: [id number]  YOB: 1950  Date of evaluation: 5/8/2024    Procedure Summary       Date: 05/08/24 Room / Location: [facility] ENDO 01 / [facility] ENDOSCOPY    Anesthesia Start: 0950 Anesthesia Stop: 1025    Procedure: COLONOSCOPY BIOPSY (Anus) Diagnosis:       Encounter for colorectal cancer screening      (Encounter for colorectal cancer screening [Z12.11, Z12.12])    Surgeons: Harris Mccormick Jr., MD Responsible Provider: Abram Loaiza APRN - CRNA    Anesthesia Type: MAC ASA Status: 3            Anesthesia Type: MAC    Kole Phase I: Kole Score: 10    Kole Phase II:      Anesthesia Post Evaluation    Patient location during evaluation: bedside  Patient participation: complete - patient participated  Level of consciousness: sleepy but conscious  Pain score: 0  Airway patency: patent  Nausea & Vomiting: no vomiting and no nausea  Cardiovascular status: blood pressure returned to baseline  Respiratory status: room air  Hydration status: stable    No notable events documented.

## 2024-05-08 NOTE — OP NOTE
Colonoscopy Procedure Note      Patient: Beata Bhatia MRN: [id number]  SSN: [id number]    YOB: 1950  Age: 74 y.o.  Sex: female      Date of Procedure: 5/8/2024  Date/Time:  5/8/2024 10:24 AM       IMPRESSION:     1.  Ascending colon polyp   2.  Right-sided diverticulosis         RECOMMENDATIONS:     1) Check biopsy results.  2) Await pathology report. Call me in 2 weeks if you have not received any information from my office regarding your results.  3) Repeat colonoscopy in 2 to 3 years for as determined by the pathology report.       INDICATION: Colon screening    PROCEDURE PERFORMED: Colonoscopy with cold biopsy     DESCRIPTION OF PROCEDURE: An informed consent was obtained.  The patient was placed in left lateral position.  Perianal inspection and a digital rectal exam was performed.  Video colonoscope was introduced into the rectum and advanced under direct vision up to the terminal ileum.  With adequate insufflation and maneuvering of the withdrawing scope, the colonic mucosa was visualized carefully.  Retroflexion was performed in the rectum to see the anorectum and also in the ascending colon to look behind the folds.  Vital signs, pulse oximetry, single lead cardiac monitor were monitored throughout the procedure as the sedation was titrated to the desired effect ensuring patient comfort and safety.  The patient tolerated the procedure very well and was transferred to the recovery area. Following is the summary of findings: A polyp measuring 0.6 cm in the proximal ascending colon was removed via multiple cold biopsies.  Diverticulosis was noted in the ascending colon.        ENDOSCOPIST: Harris Mccormick Jr, MD     ENDOSCOPE: Olympus video colonoscope     ASSISTANT:Circulator: Eder Grant RN              Scrub Person First: Teresita Dwyer     ANESTHESIA: TIVA      QUALITY OF PREPARATION: Good      FINDINGS:   Ascending colon polyp  Right-Sided  Diverticulosis        Complication: None         EBL: Minimal     SPECIMENS:   ID Type Source Tests Collected by Time Destination   1 : Ascending colon polyp Tissue Colon-Ascending SURGICAL PATHOLOGY Harris Mccormick Jr., MD 5/8/2024 1009              Harris Mccormick Jr, MD  May 8, 2024  10:24 AM

## 2024-05-24 PROBLEM — Z12.12 ENCOUNTER FOR COLORECTAL CANCER SCREENING: Status: RESOLVED | Noted: 2024-04-24 | Resolved: 2024-05-24

## 2024-05-24 PROBLEM — Z12.11 ENCOUNTER FOR COLORECTAL CANCER SCREENING: Status: RESOLVED | Noted: 2024-04-24 | Resolved: 2024-05-24

## 2024-07-19 ENCOUNTER — TELEPHONE (OUTPATIENT)
Facility: CLINIC | Age: 74
End: 2024-07-19

## 2024-07-19 ENCOUNTER — OFFICE VISIT (OUTPATIENT)
Facility: CLINIC | Age: 74
End: 2024-07-19
Payer: MEDICARE

## 2024-07-19 VITALS
WEIGHT: 237.8 LBS | SYSTOLIC BLOOD PRESSURE: 127 MMHG | HEART RATE: 83 BPM | RESPIRATION RATE: 18 BRPM | DIASTOLIC BLOOD PRESSURE: 79 MMHG | OXYGEN SATURATION: 94 % | HEIGHT: 66 IN | TEMPERATURE: 98 F | BODY MASS INDEX: 38.22 KG/M2

## 2024-07-19 DIAGNOSIS — I51.89 DIASTOLIC DYSFUNCTION: ICD-10-CM

## 2024-07-19 DIAGNOSIS — J45.20 MILD INTERMITTENT ASTHMA WITHOUT COMPLICATION: ICD-10-CM

## 2024-07-19 DIAGNOSIS — E11.65 UNCONTROLLED TYPE 2 DIABETES MELLITUS WITH HYPERGLYCEMIA (HCC): Primary | ICD-10-CM

## 2024-07-19 DIAGNOSIS — M1A.9XX0 CHRONIC GOUT WITHOUT TOPHUS, UNSPECIFIED CAUSE, UNSPECIFIED SITE: ICD-10-CM

## 2024-07-19 DIAGNOSIS — I87.303 CHRONIC VENOUS HYPERTENSION INVOLVING BOTH SIDES: ICD-10-CM

## 2024-07-19 DIAGNOSIS — E78.2 MIXED HYPERLIPIDEMIA: ICD-10-CM

## 2024-07-19 DIAGNOSIS — M15.9 PRIMARY OSTEOARTHRITIS INVOLVING MULTIPLE JOINTS: ICD-10-CM

## 2024-07-19 DIAGNOSIS — I10 ESSENTIAL (PRIMARY) HYPERTENSION: ICD-10-CM

## 2024-07-19 DIAGNOSIS — H40.9 GLAUCOMA, UNSPECIFIED GLAUCOMA TYPE, UNSPECIFIED LATERALITY: ICD-10-CM

## 2024-07-19 DIAGNOSIS — E66.01 MORBID (SEVERE) OBESITY DUE TO EXCESS CALORIES (HCC): ICD-10-CM

## 2024-07-19 PROBLEM — E11.69 MIXED HYPERLIPIDEMIA DUE TO TYPE 2 DIABETES MELLITUS (HCC): Status: RESOLVED | Noted: 2017-09-26 | Resolved: 2024-07-19

## 2024-07-19 PROCEDURE — 3078F DIAST BP <80 MM HG: CPT | Performed by: NURSE PRACTITIONER

## 2024-07-19 PROCEDURE — G8427 DOCREV CUR MEDS BY ELIG CLIN: HCPCS | Performed by: NURSE PRACTITIONER

## 2024-07-19 PROCEDURE — 99214 OFFICE O/P EST MOD 30 MIN: CPT | Performed by: NURSE PRACTITIONER

## 2024-07-19 PROCEDURE — 1090F PRES/ABSN URINE INCON ASSESS: CPT | Performed by: NURSE PRACTITIONER

## 2024-07-19 PROCEDURE — G8399 PT W/DXA RESULTS DOCUMENT: HCPCS | Performed by: NURSE PRACTITIONER

## 2024-07-19 PROCEDURE — 3074F SYST BP LT 130 MM HG: CPT | Performed by: NURSE PRACTITIONER

## 2024-07-19 PROCEDURE — G8417 CALC BMI ABV UP PARAM F/U: HCPCS | Performed by: NURSE PRACTITIONER

## 2024-07-19 PROCEDURE — 1036F TOBACCO NON-USER: CPT | Performed by: NURSE PRACTITIONER

## 2024-07-19 PROCEDURE — 1123F ACP DISCUSS/DSCN MKR DOCD: CPT | Performed by: NURSE PRACTITIONER

## 2024-07-19 RX ORDER — GLUCOSAM/CHON-MSM1/C/MANG/BOSW 500-416.6
TABLET ORAL
Qty: 200 EACH | Refills: 3 | Status: SHIPPED | OUTPATIENT
Start: 2024-07-19

## 2024-07-19 RX ORDER — GLIPIZIDE 5 MG/1
TABLET ORAL
Qty: 360 TABLET | Refills: 1 | Status: SHIPPED | OUTPATIENT
Start: 2024-07-19

## 2024-07-19 ASSESSMENT — ENCOUNTER SYMPTOMS
SINUS PAIN: 0
ABDOMINAL PAIN: 0
EYE REDNESS: 0
CONSTIPATION: 0
EYE PAIN: 0
COUGH: 0
BLOOD IN STOOL: 0
STRIDOR: 0
EYE DISCHARGE: 0
SHORTNESS OF BREATH: 0
FACIAL SWELLING: 0
EYE ITCHING: 0
PHOTOPHOBIA: 0
ABDOMINAL DISTENTION: 0
COLOR CHANGE: 0
TROUBLE SWALLOWING: 0
CHEST TIGHTNESS: 0
WHEEZING: 0
SORE THROAT: 0
NAUSEA: 0
CHOKING: 0
APNEA: 0
DIARRHEA: 0
VOMITING: 0

## 2024-07-19 ASSESSMENT — PATIENT HEALTH QUESTIONNAIRE - PHQ9
2. FEELING DOWN, DEPRESSED OR HOPELESS: NOT AT ALL
1. LITTLE INTEREST OR PLEASURE IN DOING THINGS: NOT AT ALL
SUM OF ALL RESPONSES TO PHQ QUESTIONS 1-9: 0
SUM OF ALL RESPONSES TO PHQ9 QUESTIONS 1 & 2: 0
SUM OF ALL RESPONSES TO PHQ QUESTIONS 1-9: 0

## 2024-07-19 NOTE — PROGRESS NOTES
Subjective  Chief Complaint   Patient presents with    Follow-up    Diabetes     HPI:  Beata Bhatia is a 74 y.o. female  here today for follow-up of chronic conditions with PMH of asthma, hypertension, hyperlipidemia, glaucoma, gout, type 2 diabetes, diastolic dysfunction,obesity and arthritis. Reports taking medicine as noted in chart. Following with orthopedic  and ophthalmology. Not scheduled yet with cardiology for annual visit. Does report increased pain in both knees as well as left shoulder. Would like to see about seeing new orthopedic as prior provider has left. Takes tylenol to help with pain. Glucose readings available today and averaging between  fasting.       Past Medical History:   Diagnosis Date    Acute sinusitis 11/13/2020    Arthritis     COVID-19 11/22/2020    Cyst of ovary 11/13/2020    Essential (primary) hypertension 9/26/2017    Glaucoma 11/30/2018    Gout 8/9/2019    Hypercholesterolemia 11/13/2020    Impacted cerumen 11/13/2020    Menopause     Mild intermittent asthma, uncomplicated 2/10/2020    Mixed hyperlipidemia due to type 2 diabetes mellitus (HCC) 9/26/2017    Painless rectal bleeding 11/30/2018    Type 2 diabetes mellitus without complications (HCC) 9/26/2017        Past Surgical History:   Procedure Laterality Date    COLONOSCOPY      COLONOSCOPY N/A 5/8/2024    COLONOSCOPY BIOPSY performed by Harris Mccormick Jr., MD at [facility] ENDOSCOPY    HERNIA REPAIR  Unknown date    KNEE ARTHROSCOPY Right 01/01/2013    OTHER SURGICAL HISTORY  05/16/2017    Eye surgery procedure    PARTIAL HYSTERECTOMY (CERVIX NOT REMOVED)  Unknown date        Family History   Problem Relation Age of Onset    Osteoarthritis Mother     Hypertension Other     Heart Disease Other     Diabetes Other         Social History     Socioeconomic History    Marital status:      Spouse name: None    Number of children: None    Years of education: None    Highest education level: None   Tobacco Use    Smoking

## 2024-07-19 NOTE — PROGRESS NOTES
Chief Complaint   Patient presents with    Follow-up    Diabetes     \"Have you been to the ER, urgent care clinic since your last visit?  Hospitalized since your last visit?\"    NO    “Have you seen or consulted any other health care providers outside of [facility] since your last visit?”    NO            Click Here for Release of Records Request

## 2024-07-22 DIAGNOSIS — E11.9 TYPE 2 DIABETES MELLITUS WITHOUT COMPLICATION, WITHOUT LONG-TERM CURRENT USE OF INSULIN (HCC): ICD-10-CM

## 2024-07-22 RX ORDER — INSULIN GLARGINE 100 [IU]/ML
22 INJECTION, SOLUTION SUBCUTANEOUS NIGHTLY
Qty: 15 ML | Refills: 1 | Status: SHIPPED | OUTPATIENT
Start: 2024-07-22 | End: 2024-10-20

## 2024-07-24 DIAGNOSIS — E11.65 UNCONTROLLED TYPE 2 DIABETES MELLITUS WITH HYPERGLYCEMIA (HCC): Primary | ICD-10-CM

## 2024-07-25 LAB
ALBUMIN SERPL-MCNC: 4.5 G/DL (ref 3.8–4.8)
ALBUMIN/CREAT UR: 227 MG/G CREAT (ref 0–29)
ALP SERPL-CCNC: 67 IU/L (ref 44–121)
ALT SERPL-CCNC: 16 IU/L (ref 0–32)
APPEARANCE UR: ABNORMAL
AST SERPL-CCNC: 16 IU/L (ref 0–40)
BACTERIA #/AREA URNS HPF: ABNORMAL /[HPF]
BASOPHILS # BLD AUTO: 0 X10E3/UL (ref 0–0.2)
BASOPHILS NFR BLD AUTO: 0 %
BILIRUB SERPL-MCNC: <0.2 MG/DL (ref 0–1.2)
BILIRUB UR QL STRIP: NEGATIVE
BUN SERPL-MCNC: 13 MG/DL (ref 8–27)
BUN/CREAT SERPL: 16 (ref 12–28)
CALCIUM SERPL-MCNC: 10.5 MG/DL (ref 8.7–10.3)
CASTS URNS QL MICRO: ABNORMAL /LPF
CHLORIDE SERPL-SCNC: 99 MMOL/L (ref 96–106)
CHOLEST SERPL-MCNC: 179 MG/DL (ref 100–199)
CO2 SERPL-SCNC: 24 MMOL/L (ref 20–29)
COLOR UR: YELLOW
CREAT SERPL-MCNC: 0.81 MG/DL (ref 0.57–1)
CREAT UR-MCNC: 88.1 MG/DL
EGFRCR SERPLBLD CKD-EPI 2021: 75 ML/MIN/1.73
EOSINOPHIL # BLD AUTO: 0 X10E3/UL (ref 0–0.4)
EOSINOPHIL NFR BLD AUTO: 0 %
EPI CELLS #/AREA URNS HPF: ABNORMAL /HPF (ref 0–10)
ERYTHROCYTE [DISTWIDTH] IN BLOOD BY AUTOMATED COUNT: 13.1 % (ref 11.7–15.4)
GLOBULIN SER CALC-MCNC: 3 G/DL (ref 1.5–4.5)
GLUCOSE SERPL-MCNC: 142 MG/DL (ref 70–99)
GLUCOSE UR QL STRIP: NEGATIVE
HBA1C MFR BLD: 7.5 % (ref 4.8–5.6)
HCT VFR BLD AUTO: 36.8 % (ref 34–46.6)
HDLC SERPL-MCNC: 49 MG/DL
HGB BLD-MCNC: 12.4 G/DL (ref 11.1–15.9)
HGB UR QL STRIP: NEGATIVE
IMM GRANULOCYTES # BLD AUTO: 0 X10E3/UL (ref 0–0.1)
IMM GRANULOCYTES NFR BLD AUTO: 0 %
KETONES UR QL STRIP: NEGATIVE
LDLC SERPL CALC-MCNC: 110 MG/DL (ref 0–99)
LEUKOCYTE ESTERASE UR QL STRIP: NEGATIVE
LYMPHOCYTES # BLD AUTO: 1.8 X10E3/UL (ref 0.7–3.1)
LYMPHOCYTES NFR BLD AUTO: 36 %
MCH RBC QN AUTO: 29.4 PG (ref 26.6–33)
MCHC RBC AUTO-ENTMCNC: 33.7 G/DL (ref 31.5–35.7)
MCV RBC AUTO: 87 FL (ref 79–97)
MICRO URNS: ABNORMAL
MICROALBUMIN UR-MCNC: 200.2 UG/ML
MONOCYTES # BLD AUTO: 0.5 X10E3/UL (ref 0.1–0.9)
MONOCYTES NFR BLD AUTO: 10 %
NEUTROPHILS # BLD AUTO: 2.6 X10E3/UL (ref 1.4–7)
NEUTROPHILS NFR BLD AUTO: 54 %
NITRITE UR QL STRIP: NEGATIVE
PH UR STRIP: 6.5 [PH] (ref 5–7.5)
PLATELET # BLD AUTO: 275 X10E3/UL (ref 150–450)
POTASSIUM SERPL-SCNC: 4.3 MMOL/L (ref 3.5–5.2)
PROT SERPL-MCNC: 7.5 G/DL (ref 6–8.5)
PROT UR QL STRIP: ABNORMAL
RBC # BLD AUTO: 4.22 X10E6/UL (ref 3.77–5.28)
RBC #/AREA URNS HPF: >30 /HPF (ref 0–2)
SODIUM SERPL-SCNC: 141 MMOL/L (ref 134–144)
SP GR UR STRIP: 1.02 (ref 1–1.03)
TRIGL SERPL-MCNC: 109 MG/DL (ref 0–149)
TSH SERPL DL<=0.005 MIU/L-ACNC: 1.38 UIU/ML (ref 0.45–4.5)
UROBILINOGEN UR STRIP-MCNC: 0.2 MG/DL (ref 0.2–1)
VLDLC SERPL CALC-MCNC: 20 MG/DL (ref 5–40)
WBC # BLD AUTO: 5 X10E3/UL (ref 3.4–10.8)
WBC #/AREA URNS HPF: ABNORMAL /HPF (ref 0–5)

## 2024-07-30 ENCOUNTER — TELEPHONE (OUTPATIENT)
Facility: CLINIC | Age: 74
End: 2024-07-30

## 2024-07-30 DIAGNOSIS — I10 ESSENTIAL (PRIMARY) HYPERTENSION: ICD-10-CM

## 2024-07-30 RX ORDER — AMLODIPINE BESYLATE 10 MG/1
TABLET ORAL
Qty: 90 TABLET | Refills: 1 | Status: SHIPPED | OUTPATIENT
Start: 2024-07-30

## 2024-07-30 RX ORDER — CLONIDINE HYDROCHLORIDE 0.2 MG/1
0.2 TABLET ORAL 3 TIMES DAILY
Qty: 270 TABLET | Refills: 1 | Status: SHIPPED | OUTPATIENT
Start: 2024-07-30

## 2024-07-30 NOTE — TELEPHONE ENCOUNTER
Patient is requesting for the medication; LANTUS SOLOSTAR 100 UNIT/ML injection pen to be sent to [facility]. She stated it is a delay with the mail order.

## 2024-07-31 DIAGNOSIS — E11.9 TYPE 2 DIABETES MELLITUS WITHOUT COMPLICATION, WITHOUT LONG-TERM CURRENT USE OF INSULIN (HCC): ICD-10-CM

## 2024-07-31 RX ORDER — INSULIN GLARGINE 100 [IU]/ML
22 INJECTION, SOLUTION SUBCUTANEOUS NIGHTLY
Qty: 15 ML | Refills: 1 | Status: SHIPPED | OUTPATIENT
Start: 2024-07-31 | End: 2024-10-29

## 2024-07-31 NOTE — TELEPHONE ENCOUNTER
I added the medication to patient's chart. I called patient and left a voicemail asking her to call us back to let us know which pharmacy she goes to. I will then pend up the medication to send to  for her approval.    Pended refill as requested

## 2024-08-26 ENCOUNTER — TELEPHONE (OUTPATIENT)
Facility: CLINIC | Age: 74
End: 2024-08-26

## 2024-08-26 NOTE — TELEPHONE ENCOUNTER
----- Message from DIXIE INIGUEZ LPN sent at 8/20/2024  1:37 PM EDT -----  called pt and was informed of the results. Please put pt on schedule for Aug 30th for repeat lab for urine, labs in chart

## 2024-08-31 LAB
APPEARANCE UR: ABNORMAL
BACTERIA #/AREA URNS HPF: ABNORMAL /[HPF]
BILIRUB UR QL STRIP: NEGATIVE
CASTS URNS QL MICRO: ABNORMAL /LPF
COLOR UR: YELLOW
EPI CELLS #/AREA URNS HPF: ABNORMAL /HPF (ref 0–10)
GLUCOSE UR QL STRIP: NEGATIVE
HGB UR QL STRIP: NEGATIVE
KETONES UR QL STRIP: NEGATIVE
LEUKOCYTE ESTERASE UR QL STRIP: NEGATIVE
MICRO URNS: ABNORMAL
NITRITE UR QL STRIP: NEGATIVE
PH UR STRIP: 7.5 [PH] (ref 5–7.5)
PROT UR QL STRIP: ABNORMAL
RBC #/AREA URNS HPF: >30 /HPF (ref 0–2)
SP GR UR STRIP: 1.01 (ref 1–1.03)
UROBILINOGEN UR STRIP-MCNC: 0.2 MG/DL (ref 0.2–1)
WBC #/AREA URNS HPF: ABNORMAL /HPF (ref 0–5)

## 2024-09-01 LAB — BACTERIA UR CULT: NORMAL

## 2024-09-09 DIAGNOSIS — R31.29 MICROSCOPIC HEMATURIA: Primary | ICD-10-CM

## 2024-09-22 ENCOUNTER — HOSPITAL ENCOUNTER (EMERGENCY)
Facility: HOSPITAL | Age: 74
Discharge: HOME | End: 2024-09-22
Attending: EMERGENCY MEDICINE
Payer: MEDICARE

## 2024-09-22 ENCOUNTER — APPOINTMENT (OUTPATIENT)
Facility: HOSPITAL | Age: 74
End: 2024-09-22
Payer: MEDICARE

## 2024-09-22 ENCOUNTER — APPOINTMENT (OUTPATIENT)
Facility: HOSPITAL | Age: 74
End: 2024-09-22
Attending: EMERGENCY MEDICINE
Payer: MEDICARE

## 2024-09-22 VITALS
DIASTOLIC BLOOD PRESSURE: 77 MMHG | OXYGEN SATURATION: 97 % | HEIGHT: 66 IN | BODY MASS INDEX: 36.96 KG/M2 | WEIGHT: 230 LBS | TEMPERATURE: 97.9 F | HEART RATE: 84 BPM | SYSTOLIC BLOOD PRESSURE: 141 MMHG | RESPIRATION RATE: 22 BRPM

## 2024-09-22 DIAGNOSIS — M25.512 ACUTE PAIN OF LEFT SHOULDER: Primary | ICD-10-CM

## 2024-09-22 DIAGNOSIS — I10 ESSENTIAL HYPERTENSION: ICD-10-CM

## 2024-09-22 LAB
ALBUMIN SERPL-MCNC: 4.3 G/DL (ref 3.5–5)
ALBUMIN/GLOB SERPL: 1 (ref 1.1–2.2)
ALP SERPL-CCNC: 77 U/L (ref 45–117)
ALT SERPL-CCNC: 26 U/L (ref 12–78)
ANION GAP SERPL CALC-SCNC: 8 MMOL/L (ref 2–12)
AST SERPL W P-5'-P-CCNC: 30 U/L (ref 15–37)
BASOPHILS # BLD: 0 K/UL (ref 0–0.1)
BASOPHILS NFR BLD: 0 % (ref 0–1)
BILIRUB SERPL-MCNC: 0.5 MG/DL (ref 0.2–1)
BUN SERPL-MCNC: 12 MG/DL (ref 6–20)
BUN/CREAT SERPL: 16 (ref 12–20)
CA-I BLD-MCNC: 9.9 MG/DL (ref 8.5–10.1)
CHLORIDE SERPL-SCNC: 104 MMOL/L (ref 97–108)
CO2 SERPL-SCNC: 26 MMOL/L (ref 21–32)
CREAT SERPL-MCNC: 0.77 MG/DL (ref 0.55–1.02)
DIFFERENTIAL METHOD BLD: NORMAL
EOSINOPHIL # BLD: 0 K/UL (ref 0–0.4)
EOSINOPHIL NFR BLD: 0 % (ref 0–7)
ERYTHROCYTE [DISTWIDTH] IN BLOOD BY AUTOMATED COUNT: 13.8 % (ref 11.5–14.5)
GLOBULIN SER CALC-MCNC: 4.5 G/DL (ref 2–4)
GLUCOSE BLD STRIP.AUTO-MCNC: 109 MG/DL (ref 65–100)
GLUCOSE SERPL-MCNC: 123 MG/DL (ref 65–100)
HCT VFR BLD AUTO: 41.9 % (ref 35–47)
HGB BLD-MCNC: 13.8 G/DL (ref 11.5–16)
IMM GRANULOCYTES # BLD AUTO: 0 K/UL (ref 0–0.04)
IMM GRANULOCYTES NFR BLD AUTO: 0 % (ref 0–0.5)
LYMPHOCYTES # BLD: 2.3 K/UL (ref 0.8–3.5)
LYMPHOCYTES NFR BLD: 29 % (ref 12–49)
MCH RBC QN AUTO: 29.9 PG (ref 26–34)
MCHC RBC AUTO-ENTMCNC: 32.9 G/DL (ref 30–36.5)
MCV RBC AUTO: 90.7 FL (ref 80–99)
MONOCYTES # BLD: 0.7 K/UL (ref 0–1)
MONOCYTES NFR BLD: 8 % (ref 5–13)
NEUTS SEG # BLD: 4.9 K/UL (ref 1.8–8)
NEUTS SEG NFR BLD: 63 % (ref 32–75)
NRBC # BLD: 0 K/UL (ref 0–0.01)
NRBC BLD-RTO: 0 PER 100 WBC
PERFORMED BY:: ABNORMAL
PLATELET # BLD AUTO: 286 K/UL (ref 150–400)
PMV BLD AUTO: 9.7 FL (ref 8.9–12.9)
POTASSIUM SERPL-SCNC: 4.4 MMOL/L (ref 3.5–5.1)
PROT SERPL-MCNC: 8.8 G/DL (ref 6.4–8.2)
RBC # BLD AUTO: 4.62 M/UL (ref 3.8–5.2)
SODIUM SERPL-SCNC: 138 MMOL/L (ref 136–145)
TROPONIN I SERPL HS-MCNC: 17 NG/L (ref 0–51)
TROPONIN I SERPL HS-MCNC: 17 NG/L (ref 0–51)
WBC # BLD AUTO: 7.9 K/UL (ref 3.6–11)

## 2024-09-22 PROCEDURE — 85025 COMPLETE CBC W/AUTO DIFF WBC: CPT

## 2024-09-22 PROCEDURE — 96375 TX/PRO/DX INJ NEW DRUG ADDON: CPT

## 2024-09-22 PROCEDURE — 6360000002 HC RX W HCPCS: Performed by: EMERGENCY MEDICINE

## 2024-09-22 PROCEDURE — 93005 ELECTROCARDIOGRAM TRACING: CPT | Performed by: EMERGENCY MEDICINE

## 2024-09-22 PROCEDURE — 93971 EXTREMITY STUDY: CPT

## 2024-09-22 PROCEDURE — 99284 EMERGENCY DEPT VISIT MOD MDM: CPT

## 2024-09-22 PROCEDURE — 96374 THER/PROPH/DIAG INJ IV PUSH: CPT

## 2024-09-22 PROCEDURE — 82962 GLUCOSE BLOOD TEST: CPT

## 2024-09-22 PROCEDURE — 73030 X-RAY EXAM OF SHOULDER: CPT

## 2024-09-22 PROCEDURE — 84484 ASSAY OF TROPONIN QUANT: CPT

## 2024-09-22 PROCEDURE — 36415 COLL VENOUS BLD VENIPUNCTURE: CPT

## 2024-09-22 PROCEDURE — 80053 COMPREHEN METABOLIC PANEL: CPT

## 2024-09-22 RX ORDER — ONDANSETRON 4 MG/1
4 TABLET, ORALLY DISINTEGRATING ORAL ONCE
Status: DISCONTINUED | OUTPATIENT
Start: 2024-09-22 | End: 2024-09-22

## 2024-09-22 RX ORDER — METOPROLOL TARTRATE 50 MG
50 TABLET ORAL
Status: DISCONTINUED | OUTPATIENT
Start: 2024-09-22 | End: 2024-09-22

## 2024-09-22 RX ORDER — MORPHINE SULFATE 4 MG/ML
4 INJECTION, SOLUTION INTRAMUSCULAR; INTRAVENOUS
Status: COMPLETED | OUTPATIENT
Start: 2024-09-22 | End: 2024-09-22

## 2024-09-22 RX ORDER — LIDOCAINE 50 MG/G
1 PATCH TOPICAL DAILY
Qty: 10 PATCH | Refills: 0 | Status: SHIPPED | OUTPATIENT
Start: 2024-09-22 | End: 2024-10-02

## 2024-09-22 RX ORDER — MORPHINE SULFATE 4 MG/ML
4 INJECTION, SOLUTION INTRAMUSCULAR; INTRAVENOUS
Status: DISCONTINUED | OUTPATIENT
Start: 2024-09-22 | End: 2024-09-22

## 2024-09-22 RX ORDER — LABETALOL HYDROCHLORIDE 5 MG/ML
20 INJECTION, SOLUTION INTRAVENOUS
Status: COMPLETED | OUTPATIENT
Start: 2024-09-22 | End: 2024-09-22

## 2024-09-22 RX ORDER — LABETALOL HYDROCHLORIDE 5 MG/ML
20 INJECTION, SOLUTION INTRAVENOUS
Status: DISCONTINUED | OUTPATIENT
Start: 2024-09-22 | End: 2024-09-22

## 2024-09-22 RX ORDER — ONDANSETRON 2 MG/ML
4 INJECTION INTRAMUSCULAR; INTRAVENOUS ONCE
Status: COMPLETED | OUTPATIENT
Start: 2024-09-22 | End: 2024-09-22

## 2024-09-22 RX ADMIN — MORPHINE SULFATE 4 MG: 4 INJECTION, SOLUTION INTRAMUSCULAR; INTRAVENOUS at 15:45

## 2024-09-22 RX ADMIN — ONDANSETRON 4 MG: 2 INJECTION INTRAMUSCULAR; INTRAVENOUS at 15:47

## 2024-09-22 RX ADMIN — LABETALOL HYDROCHLORIDE 20 MG: 5 INJECTION, SOLUTION INTRAVENOUS at 17:30

## 2024-09-22 ASSESSMENT — PAIN - FUNCTIONAL ASSESSMENT
PAIN_FUNCTIONAL_ASSESSMENT: 0-10

## 2024-09-22 ASSESSMENT — PAIN SCALES - GENERAL
PAINLEVEL_OUTOF10: 6
PAINLEVEL_OUTOF10: 10
PAINLEVEL_OUTOF10: 6
PAINLEVEL_OUTOF10: 10

## 2024-09-22 ASSESSMENT — LIFESTYLE VARIABLES
HOW MANY STANDARD DRINKS CONTAINING ALCOHOL DO YOU HAVE ON A TYPICAL DAY: PATIENT DOES NOT DRINK
HOW OFTEN DO YOU HAVE A DRINK CONTAINING ALCOHOL: NEVER

## 2024-09-22 ASSESSMENT — PAIN DESCRIPTION - LOCATION: LOCATION: SHOULDER

## 2024-09-22 NOTE — ED TRIAGE NOTES
Arrived with a c/o L shoulder pain and problems with ROM. Pt states hx of arthritis and her shoulder is \"bone to bone\"

## 2024-09-22 NOTE — DISCHARGE INSTRUCTIONS
Thank you for choosing our Emergency Department for your care.  It is our privilege to care for you in your time of need.  In the next several days, you may receive a survey via email or mailed to your home about your experience with our team.  We would greatly appreciate you taking a few minutes to complete the survey, as we use this information to learn what we have done well and what we could be doing better. Thank you for trusting us with your care!    Below you will find a list of your tests from today's visit.   Labs  Recent Results (from the past 12 hour(s))   CBC with Auto Differential    Collection Time: 09/22/24  3:00 PM   Result Value Ref Range    WBC 7.9 3.6 - 11.0 K/uL    RBC 4.62 3.80 - 5.20 M/uL    Hemoglobin 13.8 11.5 - 16.0 g/dL    Hematocrit 41.9 35.0 - 47.0 %    MCV 90.7 80.0 - 99.0 FL    MCH 29.9 26.0 - 34.0 PG    MCHC 32.9 30.0 - 36.5 g/dL    RDW 13.8 11.5 - 14.5 %    Platelets 286 150 - 400 K/uL    MPV 9.7 8.9 - 12.9 FL    Nucleated RBCs 0.0 0.0  WBC    nRBC 0.00 0.00 - 0.01 K/uL    Neutrophils % 63 32 - 75 %    Lymphocytes % 29 12 - 49 %    Monocytes % 8 5 - 13 %    Eosinophils % 0 0 - 7 %    Basophils % 0 0 - 1 %    Immature Granulocytes % 0 0 - 0.5 %    Neutrophils Absolute 4.9 1.8 - 8.0 K/UL    Lymphocytes Absolute 2.3 0.8 - 3.5 K/UL    Monocytes Absolute 0.7 0.0 - 1.0 K/UL    Eosinophils Absolute 0.0 0.0 - 0.4 K/UL    Basophils Absolute 0.0 0.0 - 0.1 K/UL    Immature Granulocytes Absolute 0.0 0.00 - 0.04 K/UL    Differential Type AUTOMATED     Comprehensive Metabolic Panel    Collection Time: 09/22/24  3:00 PM   Result Value Ref Range    Sodium 138 136 - 145 mmol/L    Potassium 4.4 3.5 - 5.1 mmol/L    Chloride 104 97 - 108 mmol/L    CO2 26 21 - 32 mmol/L    Anion Gap 8 2 - 12 mmol/L    Glucose 123 (H) 65 - 100 mg/dL    BUN 12 6 - 20 mg/dL    Creatinine 0.77 0.55 - 1.02 mg/dL    BUN/Creatinine Ratio 16 12 - 20      Est, Glom Filt Rate 80 >60 ml/min/1.73m2    Calcium 9.9 8.5 -

## 2024-09-22 NOTE — ED PROVIDER NOTES
[facility] EMERGENCY DEPT  EMERGENCY DEPARTMENT HISTORY AND PHYSICAL EXAM      Date: 9/22/2024  Patient Name: Beata Bhatia  MRN: [id number]  Birthdate 1950  Date of evaluation: 9/22/2024  Provider: Najma Arias MD   Note Started: 3:56 PM EDT 9/22/24    HISTORY OF PRESENT ILLNESS     Chief Complaint   Patient presents with    Shoulder Pain       History Provided By: Patient    HPI: Beata Bhatia is a 74 y.o. female patient with known shoulder issues.  Recommended to have surgery but does not have it scheduled.  Patient has been having severe 10 out of 10 shoulder pain in the left shoulder.  Difficulty with range of motion secondary to pain.  Denies chest pain denies difficulty breathing denies numbness or weakness.  No falls or trauma.  Unclear what triggered the pain.    PAST MEDICAL HISTORY   Past Medical History:  Past Medical History:   Diagnosis Date    Acute sinusitis 11/13/2020    Arthritis     COVID-19 11/22/2020    Cyst of ovary 11/13/2020    Essential (primary) hypertension 9/26/2017    Glaucoma 11/30/2018    Gout 8/9/2019    Hypercholesterolemia 11/13/2020    Impacted cerumen 11/13/2020    Menopause     Mild intermittent asthma, uncomplicated 2/10/2020    Mixed hyperlipidemia due to type 2 diabetes mellitus (HCC) 9/26/2017    Painless rectal bleeding 11/30/2018    Type 2 diabetes mellitus without complications (HCC) 9/26/2017       Past Surgical History:  Past Surgical History:   Procedure Laterality Date    COLONOSCOPY      COLONOSCOPY N/A 5/8/2024    COLONOSCOPY BIOPSY performed by Harris Mccormick Jr., MD at [facility] ENDOSCOPY    HERNIA REPAIR  Unknown date    KNEE ARTHROSCOPY Right 01/01/2013    OTHER SURGICAL HISTORY  05/16/2017    Eye surgery procedure    PARTIAL HYSTERECTOMY (CERVIX NOT REMOVED)  Unknown date       Family History:  Family History   Problem Relation Age of Onset    Osteoarthritis Mother     Hypertension Other     Heart Disease Other     Diabetes Other        Social History:  Social  Exercise Vital Sign     Days of Exercise per Week: 2 days     Minutes of Exercise per Session: 30 min   Stress: Not on file   Social Connections: Not on file   Intimate Partner Violence: Not on file   Depression: Not at risk (7/19/2024)    PHQ-2     PHQ-2 Score: 0   Housing Stability: Unknown (6/5/2023)    Housing Stability Vital Sign     Unable to Pay for Housing in the Last Year: No     Number of Places Lived in the Last Year: Not on file     Unstable Housing in the Last Year: No   Interpersonal Safety: Not At Risk (9/22/2024)    Interpersonal Safety Domain Source: IP Abuse Screening     Physical abuse: Denies     Verbal abuse: Denies     Emotional abuse: Denies     Financial abuse: Denies     Sexual abuse: Denies   Utilities: Not on file       PHYSICAL EXAM   Physical Exam  Constitutional:       General: She is not in acute distress.     Appearance: Normal appearance.   HENT:      Head: Normocephalic and atraumatic.      Right Ear: External ear normal.      Left Ear: External ear normal.      Nose: Nose normal. No congestion or rhinorrhea.      Mouth/Throat:      Mouth: Mucous membranes are moist.   Eyes:      Extraocular Movements: Extraocular movements intact.      Conjunctiva/sclera: Conjunctivae normal.      Pupils: Pupils are equal, round, and reactive to light.   Cardiovascular:      Rate and Rhythm: Normal rate and regular rhythm.      Pulses: Normal pulses.      Heart sounds: Normal heart sounds.   Pulmonary:      Effort: Pulmonary effort is normal. No respiratory distress.      Breath sounds: Normal breath sounds.   Abdominal:      General: Abdomen is flat. There is no distension.      Tenderness: There is no abdominal tenderness.   Musculoskeletal:         General: No swelling or deformity. Normal range of motion.      Cervical back: Normal range of motion.      Comments: Slight tenderness over the medial humerus.  Over the musculature.  Severe tenderness with passive movement of the left shoulder.

## 2024-09-23 LAB
ECHO BSA: 2.2 M2
EKG ATRIAL RATE: 106 BPM
EKG DIAGNOSIS: NORMAL
EKG P AXIS: 80 DEGREES
EKG P-R INTERVAL: 170 MS
EKG Q-T INTERVAL: 342 MS
EKG QRS DURATION: 90 MS
EKG QTC CALCULATION (BAZETT): 454 MS
EKG R AXIS: -9 DEGREES
EKG T AXIS: 50 DEGREES
EKG VENTRICULAR RATE: 106 BPM

## 2024-09-23 PROCEDURE — 93971 EXTREMITY STUDY: CPT | Performed by: SURGERY

## 2024-09-25 RX ORDER — PRAVASTATIN SODIUM 40 MG
TABLET ORAL
Qty: 90 TABLET | Refills: 1 | Status: SHIPPED | OUTPATIENT
Start: 2024-09-25

## 2024-09-25 RX ORDER — LISINOPRIL 20 MG/1
TABLET ORAL
Qty: 90 TABLET | Refills: 1 | Status: SHIPPED | OUTPATIENT
Start: 2024-09-25

## 2024-09-30 RX ORDER — HYDROCHLOROTHIAZIDE 25 MG/1
TABLET ORAL
Qty: 90 TABLET | Refills: 1 | Status: SHIPPED | OUTPATIENT
Start: 2024-09-30

## 2024-09-30 RX ORDER — ALLOPURINOL 100 MG/1
TABLET ORAL
Qty: 90 TABLET | Refills: 1 | Status: SHIPPED | OUTPATIENT
Start: 2024-09-30

## 2024-10-03 ENCOUNTER — OFFICE VISIT (OUTPATIENT)
Age: 74
End: 2024-10-03

## 2024-10-03 VITALS
BODY MASS INDEX: 36.96 KG/M2 | WEIGHT: 230 LBS | HEART RATE: 96 BPM | HEIGHT: 66 IN | SYSTOLIC BLOOD PRESSURE: 160 MMHG | DIASTOLIC BLOOD PRESSURE: 86 MMHG

## 2024-10-03 DIAGNOSIS — R31.21 ASYMPTOMATIC MICROSCOPIC HEMATURIA: Primary | ICD-10-CM

## 2024-10-03 RX ORDER — FLUTICASONE PROPIONATE AND SALMETEROL 250; 50 UG/1; UG/1
POWDER RESPIRATORY (INHALATION)
COMMUNITY
Start: 2024-09-11

## 2024-10-03 NOTE — PROGRESS NOTES
Chief Complaint   Patient presents with    New Patient    Hematuria     1. Have you been to the ER, urgent care clinic since your last visit?  Hospitalized since your last visit? Arthritis in left shoulder    2. Have you seen or consulted any other health care providers outside of the [facility] since your last visit?  Include any pap smears or colon screening. No  BP (!) 160/86   Pulse 96   Ht 1.676 m (5' 6\")   Wt 104.3 kg (230 lb)   BMI 37.12 kg/m²

## 2024-10-03 NOTE — PROGRESS NOTES
HISTORY OF PRESENT ILLNESS  Beata Bhatia is a 74 y.o. female.  Chief Complaint   Patient presents with    New Patient    Hematuria       74 year old female with asymptomatic hematuria. No prior urologic issues. No symptoms today.     Hematuria        PAST MEDICAL HISTORY:  PMHx (including negatives):  has a past medical history of Acute sinusitis, Arthritis, COVID-19, Cyst of ovary, Essential (primary) hypertension, Glaucoma, Gout, Hypercholesterolemia, Impacted cerumen, Menopause, Mild intermittent asthma, uncomplicated, Mixed hyperlipidemia due to type 2 diabetes mellitus (HCC), Painless rectal bleeding, and Type 2 diabetes mellitus without complications (HCC).   PSurgHx:  has a past surgical history that includes hernia repair (Unknown date); Knee arthroscopy (Right, 01/01/2013); Colonoscopy; other surgical history (05/16/2017); Partial hysterectomy (Unknown date); and Colonoscopy (N/A, 5/8/2024).  PSocHx:  reports that she has never smoked. She has never used smokeless tobacco. She reports that she does not drink alcohol and does not use drugs.     Review of Systems   Genitourinary:  Positive for hematuria.         Physical Exam  Constitutional:       General: She is not in acute distress.     Appearance: She is not ill-appearing.   HENT:      Head: Normocephalic and atraumatic.   Eyes:      Extraocular Movements: Extraocular movements intact.      Pupils: Pupils are equal, round, and reactive to light.   Pulmonary:      Effort: Pulmonary effort is normal.   Musculoskeletal:         General: Normal range of motion.   Neurological:      General: No focal deficit present.      Mental Status: She is alert and oriented to person, place, and time.   Psychiatric:         Mood and Affect: Mood normal.     I reviewed her prior medical records including notes from PCP. I reviewed the lab results including CBC, BMP, U/A, and microscopy.     ASSESSMENT AND PLAN      1. Asymptomatic microscopic hematuria  Assessment &

## 2024-10-04 ENCOUNTER — TELEPHONE (OUTPATIENT)
Age: 74
End: 2024-10-04

## 2024-10-04 NOTE — TELEPHONE ENCOUNTER
Called patient to confirm appointment on October 7th at 11 am with Dr. Barros. Patient confirmed and was made aware of her copay, as well as, location whereabouts.

## 2024-10-07 ENCOUNTER — OFFICE VISIT (OUTPATIENT)
Age: 74
End: 2024-10-07
Payer: MEDICARE

## 2024-10-07 VITALS — HEIGHT: 66 IN | BODY MASS INDEX: 37.14 KG/M2

## 2024-10-07 DIAGNOSIS — M19.012 LOCALIZED OSTEOARTHRITIS OF LEFT SHOULDER: Primary | ICD-10-CM

## 2024-10-07 PROCEDURE — 20610 DRAIN/INJ JOINT/BURSA W/O US: CPT | Performed by: ORTHOPAEDIC SURGERY

## 2024-10-07 PROCEDURE — 1123F ACP DISCUSS/DSCN MKR DOCD: CPT | Performed by: ORTHOPAEDIC SURGERY

## 2024-10-07 PROCEDURE — 1036F TOBACCO NON-USER: CPT | Performed by: ORTHOPAEDIC SURGERY

## 2024-10-07 PROCEDURE — G8399 PT W/DXA RESULTS DOCUMENT: HCPCS | Performed by: ORTHOPAEDIC SURGERY

## 2024-10-07 PROCEDURE — 1090F PRES/ABSN URINE INCON ASSESS: CPT | Performed by: ORTHOPAEDIC SURGERY

## 2024-10-07 PROCEDURE — G8417 CALC BMI ABV UP PARAM F/U: HCPCS | Performed by: ORTHOPAEDIC SURGERY

## 2024-10-07 PROCEDURE — G8484 FLU IMMUNIZE NO ADMIN: HCPCS | Performed by: ORTHOPAEDIC SURGERY

## 2024-10-07 PROCEDURE — G8427 DOCREV CUR MEDS BY ELIG CLIN: HCPCS | Performed by: ORTHOPAEDIC SURGERY

## 2024-10-07 PROCEDURE — 99203 OFFICE O/P NEW LOW 30 MIN: CPT | Performed by: ORTHOPAEDIC SURGERY

## 2024-10-07 RX ORDER — LIDOCAINE HYDROCHLORIDE 10 MG/ML
2 INJECTION, SOLUTION EPIDURAL; INFILTRATION; INTRACAUDAL; PERINEURAL ONCE
Status: COMPLETED | OUTPATIENT
Start: 2024-10-07 | End: 2024-10-07

## 2024-10-07 RX ORDER — TRIAMCINOLONE ACETONIDE 40 MG/ML
40 INJECTION, SUSPENSION INTRA-ARTICULAR; INTRAMUSCULAR ONCE
Status: COMPLETED | OUTPATIENT
Start: 2024-10-07 | End: 2024-10-07

## 2024-10-07 RX ADMIN — TRIAMCINOLONE ACETONIDE 40 MG: 40 INJECTION, SUSPENSION INTRA-ARTICULAR; INTRAMUSCULAR at 15:16

## 2024-10-07 RX ADMIN — LIDOCAINE HYDROCHLORIDE 2 ML: 10 INJECTION, SOLUTION EPIDURAL; INFILTRATION; INTRACAUDAL; PERINEURAL at 15:16

## 2024-10-07 ASSESSMENT — PATIENT HEALTH QUESTIONNAIRE - PHQ9
SUM OF ALL RESPONSES TO PHQ QUESTIONS 1-9: 0
SUM OF ALL RESPONSES TO PHQ QUESTIONS 1-9: 0
2. FEELING DOWN, DEPRESSED OR HOPELESS: NOT AT ALL
SUM OF ALL RESPONSES TO PHQ QUESTIONS 1-9: 0
SUM OF ALL RESPONSES TO PHQ9 QUESTIONS 1 & 2: 0
SUM OF ALL RESPONSES TO PHQ QUESTIONS 1-9: 0
1. LITTLE INTEREST OR PLEASURE IN DOING THINGS: NOT AT ALL

## 2024-10-07 NOTE — PATIENT INSTRUCTIONS
Learning About Joint Injections  What are joint injections?     Joint injections are shots into a joint, such as the knee or shoulder. They are used to put in medicines, such as pain relievers and steroid medicines. Steroids can help reduce inflammation. A steroid shot can sometimes help with short-term pain relief when other treatments haven't worked.  How are they done?  First, the area over the joint will be cleaned. Your doctor may then use a tiny needle to numb the skin in the area where you will get the joint injection.  If a tiny needle is used to numb the area, your doctor will use another needle to inject the medicine. Your doctor may use a pain reliever, a steroid, or both. You may feel some pressure or discomfort.  Your doctor may put ice on the area before you go home.  What can you expect after a joint injection?  You will probably go home soon after your shot. You may have numbness around the joint for a few hours.  If your shot included both a pain reliever and a steroid, then the pain will probably go away right away. But it might come back after a few hours. This might happen if the pain reliever wears off and the steroid hasn't started to work yet. Steroids don't always work. But when they do, the pain relief can last for several days to a few months or longer.  Your doctor may tell you to use ice on the area. You can also use ice if the pain comes back. Put ice or a cold pack on your joint for 10 to 20 minutes at a time. Put a thin cloth between the ice and your skin.  Follow your doctor's instructions carefully.  Follow-up care is a key part of your treatment and safety. Be sure to make and go to all appointments, and call your doctor if you are having problems. It's also a good idea to know your test results and keep a list of the medicines you take.  Current as of: July 17, 2023  Content Version: 14.2  © 2024 [employer].   Care instructions adapted under license by [facility]

## 2024-10-07 NOTE — PROGRESS NOTES
Identified pt with two pt identifiers (name and ). Reviewed chart in preparation for visit and have obtained necessary documentation.    Beata Bhatia is a 74 y.o. female Follow-up ( ED FU LT Shoulder )  .    Vitals:    10/07/24 1121   Height: 1.676 m (5' 5.98\")          1. Have you been to the ER, urgent care clinic since your last visit?  Hospitalized since your last visit?  yes - ER     2. Have you seen or consulted any other health care providers outside of the [facility] since your last visit?  Include any pap smears or colon screening.  no

## 2024-10-07 NOTE — PROGRESS NOTES
Beata Bhatia (: 1950) is a 74 y.o. female, new patient, here for evaluation of the following chief complaint(s):  Follow-up ( ED FU LT Shoulder )       ASSESSMENT/PLAN:  Below is the assessment and plan developed based on review of pertinent history, physical exam, labs, studies, and medications.  Available imaging was independently reviewed including 3 views of the left shoulder which were personally reviewed and interpreted by me and show evidence of severe glenohumeral joint osteoarthritis with bone-on-bone articulation and large inferior osteophyte off the humeral head.    Discussed diagnosis of severe left shoulder osteoarthritis with the patient and treatment options.  Discussed nonoperative treatment would involve rest, activity modification, anti-inflammatories as needed for pain control, physical therapy to maintain range of motion and strength.  Discussed a trial of a steroid injection.    After the risks, benefits, and alternatives were explained, the patient elected to undergo left shoulder steroid injection.  Risks including infection, steroid flare, hyperglycemia, hypopigmentation were explained.  Informed verbal consent was obtained.  In a sterile manner the patient underwent an injection of a mixture of 2 ml of 1% lidocaine, 2 ml of marcaine and 1 ml of 40 mg kenolog.  The patient tolerated the procedure well.      Discussed that the steroid injection does not provide her adequate pain relief she could consider further evaluation for shoulder replacement which would require obtaining an MRI and CT scan to evaluate the bone stock and rotator cuff tendon.  Patient wants to avoid surgery at this time.    She has a history of right total knee replacement and reports she was told she needed a left knee replacement.  She can follow-up with my partner Dr. Carpenter with knee x-rays to discuss this.  All questions were answered.    1. Localized osteoarthritis of left shoulder  -     triamcinolone

## 2024-10-16 DIAGNOSIS — E11.9 TYPE 2 DIABETES MELLITUS WITHOUT COMPLICATION, WITHOUT LONG-TERM CURRENT USE OF INSULIN (HCC): Primary | ICD-10-CM

## 2024-10-16 RX ORDER — CALCIUM CITRATE/VITAMIN D3 200MG-6.25
TABLET ORAL
Qty: 200 STRIP | Refills: 3 | Status: SHIPPED | OUTPATIENT
Start: 2024-10-16

## 2024-10-18 ENCOUNTER — TELEPHONE (OUTPATIENT)
Age: 74
End: 2024-10-18

## 2024-10-18 NOTE — TELEPHONE ENCOUNTER
Spoke with pt, she stated that she had her imaging done this morning at [facility]. I will send a records request.

## 2024-10-19 PROBLEM — M25.512 ACUTE PAIN OF LEFT SHOULDER: Status: ACTIVE | Noted: 2024-10-19

## 2024-10-21 ENCOUNTER — PROCEDURE VISIT (OUTPATIENT)
Age: 74
End: 2024-10-21
Payer: MEDICARE

## 2024-10-21 VITALS
WEIGHT: 230 LBS | DIASTOLIC BLOOD PRESSURE: 76 MMHG | HEIGHT: 66 IN | HEART RATE: 84 BPM | SYSTOLIC BLOOD PRESSURE: 130 MMHG | BODY MASS INDEX: 36.96 KG/M2

## 2024-10-21 DIAGNOSIS — R31.21 ASYMPTOMATIC MICROSCOPIC HEMATURIA: Primary | ICD-10-CM

## 2024-10-21 LAB
AVG FLOW RATE, POC: 8
BILIRUBIN, URINE, POC: NEGATIVE
BLOOD URINE, POC: ABNORMAL
FLOW TIME, POC: 21
GLUCOSE URINE, POC: NEGATIVE
KETONES, URINE, POC: NEGATIVE
LEUKOCYTE ESTERASE, URINE, POC: ABNORMAL
MAX FLOW RATE, POC: 17
NITRITE, URINE, POC: NEGATIVE
PH, URINE, POC: 7 (ref 4.6–8)
PROTEIN,URINE, POC: 100
PVR, POC: 2 CC
SPECIFIC GRAVITY, URINE, POC: 1.01 (ref 1–1.03)
TIME TO MAX, POC: 5
URINALYSIS CLARITY, POC: CLEAR
URINALYSIS COLOR, POC: YELLOW
UROBILINOGEN, POC: ABNORMAL
VOIDED VOLUME, POC: 169
VOIDING TIME, POC: 21

## 2024-10-21 PROCEDURE — 1090F PRES/ABSN URINE INCON ASSESS: CPT | Performed by: STUDENT IN AN ORGANIZED HEALTH CARE EDUCATION/TRAINING PROGRAM

## 2024-10-21 PROCEDURE — 51741 ELECTRO-UROFLOWMETRY FIRST: CPT | Performed by: STUDENT IN AN ORGANIZED HEALTH CARE EDUCATION/TRAINING PROGRAM

## 2024-10-21 PROCEDURE — 52000 CYSTOURETHROSCOPY: CPT | Performed by: STUDENT IN AN ORGANIZED HEALTH CARE EDUCATION/TRAINING PROGRAM

## 2024-10-21 PROCEDURE — 81003 URINALYSIS AUTO W/O SCOPE: CPT | Performed by: STUDENT IN AN ORGANIZED HEALTH CARE EDUCATION/TRAINING PROGRAM

## 2024-10-21 PROCEDURE — 51798 US URINE CAPACITY MEASURE: CPT | Performed by: STUDENT IN AN ORGANIZED HEALTH CARE EDUCATION/TRAINING PROGRAM

## 2024-10-21 PROCEDURE — G8417 CALC BMI ABV UP PARAM F/U: HCPCS | Performed by: STUDENT IN AN ORGANIZED HEALTH CARE EDUCATION/TRAINING PROGRAM

## 2024-10-21 PROCEDURE — 99213 OFFICE O/P EST LOW 20 MIN: CPT | Performed by: STUDENT IN AN ORGANIZED HEALTH CARE EDUCATION/TRAINING PROGRAM

## 2024-10-21 PROCEDURE — G8427 DOCREV CUR MEDS BY ELIG CLIN: HCPCS | Performed by: STUDENT IN AN ORGANIZED HEALTH CARE EDUCATION/TRAINING PROGRAM

## 2024-10-21 PROCEDURE — G8399 PT W/DXA RESULTS DOCUMENT: HCPCS | Performed by: STUDENT IN AN ORGANIZED HEALTH CARE EDUCATION/TRAINING PROGRAM

## 2024-10-21 PROCEDURE — 1123F ACP DISCUSS/DSCN MKR DOCD: CPT | Performed by: STUDENT IN AN ORGANIZED HEALTH CARE EDUCATION/TRAINING PROGRAM

## 2024-10-21 PROCEDURE — G8484 FLU IMMUNIZE NO ADMIN: HCPCS | Performed by: STUDENT IN AN ORGANIZED HEALTH CARE EDUCATION/TRAINING PROGRAM

## 2024-10-21 PROCEDURE — 51725 SIMPLE CYSTOMETROGRAM: CPT | Performed by: STUDENT IN AN ORGANIZED HEALTH CARE EDUCATION/TRAINING PROGRAM

## 2024-10-21 PROCEDURE — 3078F DIAST BP <80 MM HG: CPT | Performed by: STUDENT IN AN ORGANIZED HEALTH CARE EDUCATION/TRAINING PROGRAM

## 2024-10-21 PROCEDURE — 1036F TOBACCO NON-USER: CPT | Performed by: STUDENT IN AN ORGANIZED HEALTH CARE EDUCATION/TRAINING PROGRAM

## 2024-10-21 PROCEDURE — 3075F SYST BP GE 130 - 139MM HG: CPT | Performed by: STUDENT IN AN ORGANIZED HEALTH CARE EDUCATION/TRAINING PROGRAM

## 2024-10-21 RX ORDER — CIPROFLOXACIN 500 MG/1
500 TABLET, FILM COATED ORAL ONCE
Status: DISCONTINUED | OUTPATIENT
Start: 2024-10-21 | End: 2024-10-23

## 2024-10-21 NOTE — PROGRESS NOTES
HISTORY OF PRESENT ILLNESS  Beata Bhatia is a 74 y.o. female.  Chief Complaint   Patient presents with    Procedure     Cystoscopy       74-year-old female who returns today for cystoscopy.  She has had some ongoing gross hematuria since her last visit.  She does not have symptoms from this.  CT scan was completed but has not been read yet.  This was performed at [facility] and records will need to be obtained as I cannot review the images personally.        PAST MEDICAL HISTORY:  PMHx (including negatives):  has a past medical history of Acute sinusitis, Arthritis, COVID-19, Cyst of ovary, Essential (primary) hypertension, Glaucoma, Gout, Hypercholesterolemia, Impacted cerumen, Menopause, Mild intermittent asthma, uncomplicated, Mixed hyperlipidemia due to type 2 diabetes mellitus (HCC), Painless rectal bleeding, and Type 2 diabetes mellitus without complications (HCC).   PSurgHx:  has a past surgical history that includes hernia repair (Unknown date); Knee arthroscopy (Right, 01/01/2013); Colonoscopy; other surgical history (05/16/2017); Partial hysterectomy (Unknown date); Colonoscopy (N/A, 05/08/2024); and Cystoscopy (10/21/2024).  PSocHx:  reports that she has never smoked. She has never used smokeless tobacco. She reports that she does not drink alcohol and does not use drugs.     Review of Systems      Physical Exam    ASSESSMENT AND PLAN      1. Asymptomatic microscopic hematuria  Assessment & Plan:  Chronic.  Stable.  Awaiting CT scan results.  Orders:  -     AMB POC URINALYSIS DIP STICK AUTO W/O MICRO  -     Culture, Urine  -     ciprofloxacin (CIPRO) tablet 500 mg; 500 mg, Oral, ONCE, 1 dose, On Mon 10/21/24 at 1100Antimicrobial Indications: Surgical ProphylaxisDo not take with dairy products or calcium-fortified juices. Tube feeding (TF) interaction, obtain physician order to manage. Recommend holding TF for 1 hr before and 1 hr after dose. Due to decreased absorption do not give by J tube.

## 2024-10-21 NOTE — PROGRESS NOTES
Chief Complaint   Patient presents with    Procedure     Cystoscopy     1. Have you been to the ER, urgent care clinic since your last visit?  Hospitalized since your last visit?No    2. Have you seen or consulted any other health care providers outside of the [facility] since your last visit?  Include any pap smears or colon screening. No  /76   Pulse 84   Ht 1.676 m (5' 5.98\")   Wt 104.3 kg (230 lb)   BMI 37.15 kg/m²

## 2024-10-21 NOTE — PROGRESS NOTES
Cystoscopy    The patient presented for cystoscopy.  The risks and benefits were explained to the patient and she wished to proceed.    The patient was placed in a relaxed lithotomy position.  Her genitals were prepped and draped sterilely with chlorhexidine and Urojet.  Using a 16 Guyanese flexible cystoscope cystourethroscopy was performed.    The urethra appeared patent.  There are no inflammatory polyps.    The bladder mucosa appeared normal without inflammation, tumors or cystic lesions.    The ureteral orifices were seen in their normal position effluxing clear urine bilaterally.    Impression: Normal Bladder    CMG    Initial urge at (cc): 100  Strong urge at (cc): 200  Findings: No uninhibited contractions noted.  No urge related incontinence noted    Uroflow/ PVR    Max Flow: 17 ml/sec    Avg flow: 8 ml/sec    Voided Volume:  170ml    Residual Volume:0ml    Shape of the curve: Normal bell shaped curve    Impression: Normal CMG

## 2024-10-23 ENCOUNTER — OFFICE VISIT (OUTPATIENT)
Facility: CLINIC | Age: 74
End: 2024-10-23

## 2024-10-23 VITALS
TEMPERATURE: 97.4 F | DIASTOLIC BLOOD PRESSURE: 76 MMHG | RESPIRATION RATE: 20 BRPM | SYSTOLIC BLOOD PRESSURE: 139 MMHG | HEART RATE: 87 BPM | BODY MASS INDEX: 38.65 KG/M2 | HEIGHT: 65 IN | OXYGEN SATURATION: 94 % | WEIGHT: 232 LBS

## 2024-10-23 DIAGNOSIS — R31.29 MICROSCOPIC HEMATURIA: ICD-10-CM

## 2024-10-23 DIAGNOSIS — H40.9 GLAUCOMA, UNSPECIFIED GLAUCOMA TYPE, UNSPECIFIED LATERALITY: ICD-10-CM

## 2024-10-23 DIAGNOSIS — I51.89 DIASTOLIC DYSFUNCTION: ICD-10-CM

## 2024-10-23 DIAGNOSIS — J45.20 MILD INTERMITTENT ASTHMA WITHOUT COMPLICATION: ICD-10-CM

## 2024-10-23 DIAGNOSIS — E78.2 MIXED HYPERLIPIDEMIA: ICD-10-CM

## 2024-10-23 DIAGNOSIS — M15.0 PRIMARY OSTEOARTHRITIS INVOLVING MULTIPLE JOINTS: ICD-10-CM

## 2024-10-23 DIAGNOSIS — E11.9 TYPE 2 DIABETES MELLITUS WITHOUT COMPLICATION, WITHOUT LONG-TERM CURRENT USE OF INSULIN (HCC): Primary | ICD-10-CM

## 2024-10-23 DIAGNOSIS — Z00.00 MEDICARE ANNUAL WELLNESS VISIT, SUBSEQUENT: ICD-10-CM

## 2024-10-23 DIAGNOSIS — I87.303 CHRONIC VENOUS HYPERTENSION INVOLVING BOTH SIDES: ICD-10-CM

## 2024-10-23 DIAGNOSIS — E66.01 MORBID (SEVERE) OBESITY DUE TO EXCESS CALORIES: ICD-10-CM

## 2024-10-23 DIAGNOSIS — M1A.9XX0 CHRONIC GOUT WITHOUT TOPHUS, UNSPECIFIED CAUSE, UNSPECIFIED SITE: ICD-10-CM

## 2024-10-23 DIAGNOSIS — I10 ESSENTIAL (PRIMARY) HYPERTENSION: ICD-10-CM

## 2024-10-23 LAB
BACTERIA UR CULT: NORMAL
HBA1C MFR BLD: 7.4 %

## 2024-10-23 SDOH — ECONOMIC STABILITY: INCOME INSECURITY: HOW HARD IS IT FOR YOU TO PAY FOR THE VERY BASICS LIKE FOOD, HOUSING, MEDICAL CARE, AND HEATING?: NOT VERY HARD

## 2024-10-23 SDOH — ECONOMIC STABILITY: FOOD INSECURITY: WITHIN THE PAST 12 MONTHS, THE FOOD YOU BOUGHT JUST DIDN'T LAST AND YOU DIDN'T HAVE MONEY TO GET MORE.: NEVER TRUE

## 2024-10-23 SDOH — ECONOMIC STABILITY: FOOD INSECURITY: WITHIN THE PAST 12 MONTHS, YOU WORRIED THAT YOUR FOOD WOULD RUN OUT BEFORE YOU GOT MONEY TO BUY MORE.: NEVER TRUE

## 2024-10-23 ASSESSMENT — ENCOUNTER SYMPTOMS
EYE DISCHARGE: 0
SHORTNESS OF BREATH: 0
SINUS PAIN: 0
CHOKING: 0
BLOOD IN STOOL: 0
ABDOMINAL DISTENTION: 0
DIARRHEA: 0
APNEA: 0
TROUBLE SWALLOWING: 0
VOMITING: 0
NAUSEA: 0
PHOTOPHOBIA: 0
CHEST TIGHTNESS: 0
EYE ITCHING: 0
EYE REDNESS: 0
CONSTIPATION: 0
ABDOMINAL PAIN: 0
COLOR CHANGE: 0
EYE PAIN: 0
COUGH: 0
WHEEZING: 0
SORE THROAT: 0
STRIDOR: 0
FACIAL SWELLING: 0

## 2024-10-23 ASSESSMENT — PATIENT HEALTH QUESTIONNAIRE - PHQ9
1. LITTLE INTEREST OR PLEASURE IN DOING THINGS: NOT AT ALL
SUM OF ALL RESPONSES TO PHQ QUESTIONS 1-9: 0
SUM OF ALL RESPONSES TO PHQ QUESTIONS 1-9: 0
2. FEELING DOWN, DEPRESSED OR HOPELESS: NOT AT ALL
SUM OF ALL RESPONSES TO PHQ QUESTIONS 1-9: 0
SUM OF ALL RESPONSES TO PHQ9 QUESTIONS 1 & 2: 0
SUM OF ALL RESPONSES TO PHQ QUESTIONS 1-9: 0

## 2024-10-23 NOTE — PROGRESS NOTES
Chief Complaint   Patient presents with    Medicare AWV    Diabetes     Wellness    Pt did not bring meds, went over list, pt confirmed      No other concerns       \"Have you been to the ER, urgent care clinic since your last visit?  Hospitalized since your last visit?\"    NO    “Have you seen or consulted any other health care providers outside our system since your last visit?”    NO             
MD Antoine   allopurinol (ZYLOPRIM) 100 MG tablet TAKE 1 TABLET EVERY DAY  Danni Jaime APRN - NP   hydroCHLOROthiazide (HYDRODIURIL) 25 MG tablet TAKE 1 TABLET EVERY DAY  Danni Jaime APRN - NP   lisinopril (PRINIVIL;ZESTRIL) 20 MG tablet TAKE 1 TABLET EVERY DAY  Danni Jaime APRN - NP   pravastatin (PRAVACHOL) 40 MG tablet TAKE 1 TABLET EVERY DAY  Danni Jaime APRN - NP   LANTUS SOLOSTAR 100 UNIT/ML injection pen Inject 22 Units into the skin nightly  Danni Jaime APRN - NP   cloNIDine (CATAPRES) 0.2 MG tablet TAKE 1 TABLET THREE TIMES DAILY  Danni Jaime APRN - NP   amLODIPine (NORVASC) 10 MG tablet TAKE 1 TABLET EVERY DAY  Danni Jaime APRN - NP   glipiZIDE (GLUCOTROL) 5 MG tablet TAKE 1 TABLETS TWICE DAILY  Danni Jaime APRN - NP   TRUEplus Lancets 33G MISC USE TO CHECK GLUCOSE TWO TIMES DAILY  Danni Jaime APRN - NP   metFORMIN (GLUCOPHAGE) 1000 MG tablet TAKE 1 TABLET TWICE DAILY WITH MEALS  Danni Jaime APRN - NP   Alcohol Swabs (DROPSAFE ALCOHOL PREP) 70 % PADS USE TO TEST BLOOD SUGAR EVERY DAY  Danni Jaime APRN - NP   Dulaglutide (TRULICITY) 3 MG/0.5ML SOPN Inject 3 mg into the skin once a week  Danni Jaime APRN - NP   fluticasone (FLONASE) 50 MCG/ACT nasal spray   Provider, MD Antoine   Blood Glucose Monitoring Suppl (TRUE METRIX AIR GLUCOSE METER) w/Device KIT USE AS DIRECTED  Danni Jaime APRN - NP   albuterol sulfate HFA (PROVENTIL;VENTOLIN;PROAIR) 108 (90 Base) MCG/ACT inhaler Inhale 2 puffs into the lungs every 4 hours as needed  Automatic Reconciliation, Ar   brimonidine (ALPHAGAN) 0.2 % ophthalmic solution Apply 1 drop to eye 3 times daily  Automatic Reconciliation, Ar   dorzolamide (TRUSOPT) 2 % ophthalmic solution Apply 1 drop to eye 3 times daily  Automatic Reconciliation, Ar   fluticasone-salmeterol (ADVAIR) 100-50 MCG/ACT AEPB diskus inhaler Inhale 1 puff

## 2024-11-20 ENCOUNTER — TRANSCRIBE ORDERS (OUTPATIENT)
Facility: HOSPITAL | Age: 74
End: 2024-11-20

## 2024-11-20 DIAGNOSIS — Z12.31 VISIT FOR SCREENING MAMMOGRAM: Primary | ICD-10-CM

## 2024-11-26 ENCOUNTER — HOSPITAL ENCOUNTER (OUTPATIENT)
Facility: HOSPITAL | Age: 74
Discharge: HOME | End: 2024-11-29
Payer: MEDICARE

## 2024-11-26 DIAGNOSIS — Z12.31 VISIT FOR SCREENING MAMMOGRAM: ICD-10-CM

## 2024-11-26 PROCEDURE — 77063 BREAST TOMOSYNTHESIS BI: CPT

## 2025-01-07 RX ORDER — TRIAMCINOLONE ACETONIDE 1 MG/G
OINTMENT TOPICAL
Qty: 80 G | Refills: 0 | Status: SHIPPED | OUTPATIENT
Start: 2025-01-07

## 2025-01-24 RX ORDER — ISOPROPYL ALCOHOL 70 ML/100ML
SWAB TOPICAL
Qty: 100 EACH | Refills: 5 | Status: SHIPPED | OUTPATIENT
Start: 2025-01-24

## 2025-02-24 RX ORDER — LISINOPRIL 20 MG/1
TABLET ORAL
Qty: 90 TABLET | Refills: 1 | Status: SHIPPED | OUTPATIENT
Start: 2025-02-24

## 2025-02-24 RX ORDER — HYDROCHLOROTHIAZIDE 25 MG/1
TABLET ORAL
Qty: 90 TABLET | Refills: 1 | Status: SHIPPED | OUTPATIENT
Start: 2025-02-24

## 2025-02-24 RX ORDER — ALLOPURINOL 100 MG/1
TABLET ORAL
Qty: 90 TABLET | Refills: 1 | Status: SHIPPED | OUTPATIENT
Start: 2025-02-24

## 2025-02-24 RX ORDER — PRAVASTATIN SODIUM 40 MG
TABLET ORAL
Qty: 90 TABLET | Refills: 1 | Status: SHIPPED | OUTPATIENT
Start: 2025-02-24

## 2025-03-26 ENCOUNTER — TELEPHONE (OUTPATIENT)
Facility: CLINIC | Age: 75
End: 2025-03-26

## 2025-03-26 NOTE — TELEPHONE ENCOUNTER
----- Message from Christiano LUNA sent at 3/26/2025  8:52 AM EDT -----  Regarding: ECC Escalation To Practice  ECC Escalation To Practice      Type of Escalation: Red Flag Symptom  --------------------------------------------------------------------------------------------------------------------------    Information for Provider:  Patient is looking for appointment for: Symptom Left armpit swollen and itching  Reasons for Message: Unable to reach practice     Additional Information : Pt would like to schedule an appointment with Danni Jaime PRASHANT due to pt experiencing swollen on her left armpit and it is itching.  --------------------------------------------------------------------------------------------------------------------------    Relationship to Patient: Self  Call Back Info: OK to leave message on voicemail  Preferred Call Back Number: Phone [phone]

## 2025-03-27 ENCOUNTER — OFFICE VISIT (OUTPATIENT)
Facility: CLINIC | Age: 75
End: 2025-03-27
Payer: MEDICARE

## 2025-03-27 VITALS
RESPIRATION RATE: 20 BRPM | DIASTOLIC BLOOD PRESSURE: 84 MMHG | BODY MASS INDEX: 39.34 KG/M2 | WEIGHT: 236.13 LBS | HEIGHT: 65 IN | TEMPERATURE: 97.7 F | HEART RATE: 83 BPM | SYSTOLIC BLOOD PRESSURE: 155 MMHG | OXYGEN SATURATION: 98 %

## 2025-03-27 DIAGNOSIS — L28.2 PRURITIC RASH: Primary | ICD-10-CM

## 2025-03-27 DIAGNOSIS — I10 ESSENTIAL (PRIMARY) HYPERTENSION: ICD-10-CM

## 2025-03-27 PROCEDURE — 1123F ACP DISCUSS/DSCN MKR DOCD: CPT | Performed by: NURSE PRACTITIONER

## 2025-03-27 PROCEDURE — 3077F SYST BP >= 140 MM HG: CPT | Performed by: NURSE PRACTITIONER

## 2025-03-27 PROCEDURE — 1036F TOBACCO NON-USER: CPT | Performed by: NURSE PRACTITIONER

## 2025-03-27 PROCEDURE — 1126F AMNT PAIN NOTED NONE PRSNT: CPT | Performed by: NURSE PRACTITIONER

## 2025-03-27 PROCEDURE — 3079F DIAST BP 80-89 MM HG: CPT | Performed by: NURSE PRACTITIONER

## 2025-03-27 PROCEDURE — G8427 DOCREV CUR MEDS BY ELIG CLIN: HCPCS | Performed by: NURSE PRACTITIONER

## 2025-03-27 PROCEDURE — G8399 PT W/DXA RESULTS DOCUMENT: HCPCS | Performed by: NURSE PRACTITIONER

## 2025-03-27 PROCEDURE — 1090F PRES/ABSN URINE INCON ASSESS: CPT | Performed by: NURSE PRACTITIONER

## 2025-03-27 PROCEDURE — 99213 OFFICE O/P EST LOW 20 MIN: CPT | Performed by: NURSE PRACTITIONER

## 2025-03-27 PROCEDURE — 1159F MED LIST DOCD IN RCRD: CPT | Performed by: NURSE PRACTITIONER

## 2025-03-27 PROCEDURE — G8417 CALC BMI ABV UP PARAM F/U: HCPCS | Performed by: NURSE PRACTITIONER

## 2025-03-27 PROCEDURE — 1160F RVW MEDS BY RX/DR IN RCRD: CPT | Performed by: NURSE PRACTITIONER

## 2025-03-27 RX ORDER — VALACYCLOVIR HYDROCHLORIDE 1 G/1
1000 TABLET, FILM COATED ORAL 3 TIMES DAILY
Qty: 21 TABLET | Refills: 0 | Status: SHIPPED | OUTPATIENT
Start: 2025-03-27 | End: 2025-04-03

## 2025-03-27 RX ORDER — TRIAMCINOLONE ACETONIDE 1 MG/G
OINTMENT TOPICAL
Qty: 80 G | Refills: 0 | Status: SHIPPED | OUTPATIENT
Start: 2025-03-27

## 2025-03-27 SDOH — ECONOMIC STABILITY: FOOD INSECURITY: WITHIN THE PAST 12 MONTHS, YOU WORRIED THAT YOUR FOOD WOULD RUN OUT BEFORE YOU GOT THE MONEY TO BUY MORE.: NEVER TRUE

## 2025-03-27 SDOH — ECONOMIC STABILITY: HOUSING INSECURITY: IN THE LAST 12 MONTHS, WAS THERE A TIME WHEN YOU WERE NOT ABLE TO PAY THE MORTGAGE OR RENT ON TIME?: NO

## 2025-03-27 SDOH — ECONOMIC STABILITY: FOOD INSECURITY: WITHIN THE PAST 12 MONTHS, THE FOOD YOU BOUGHT JUST DIDN'T LAST AND YOU DIDN'T HAVE MONEY TO GET MORE.: NEVER TRUE

## 2025-03-27 SDOH — ECONOMIC STABILITY: TRANSPORTATION INSECURITY: IN THE PAST 12 MONTHS, HAS LACK OF TRANSPORTATION KEPT YOU FROM MEDICAL APPOINTMENTS OR FROM GETTING MEDICATIONS?: NO

## 2025-03-27 ASSESSMENT — ENCOUNTER SYMPTOMS
STRIDOR: 0
EYE PAIN: 0
PHOTOPHOBIA: 0
BLOOD IN STOOL: 0
TROUBLE SWALLOWING: 0
CONSTIPATION: 0
FACIAL SWELLING: 0
COUGH: 0
EYE ITCHING: 0
COLOR CHANGE: 0
EYE REDNESS: 0
ABDOMINAL PAIN: 0
APNEA: 0
SHORTNESS OF BREATH: 0
SORE THROAT: 0
DIARRHEA: 0
CHEST TIGHTNESS: 0
VOMITING: 0
EYE DISCHARGE: 0
NAUSEA: 0
ABDOMINAL DISTENTION: 0
CHOKING: 0
SINUS PAIN: 0
WHEEZING: 0

## 2025-03-27 ASSESSMENT — PATIENT HEALTH QUESTIONNAIRE - PHQ9
SUM OF ALL RESPONSES TO PHQ QUESTIONS 1-9: 0
1. LITTLE INTEREST OR PLEASURE IN DOING THINGS: NOT AT ALL
SUM OF ALL RESPONSES TO PHQ QUESTIONS 1-9: 0
2. FEELING DOWN, DEPRESSED OR HOPELESS: NOT AT ALL
SUM OF ALL RESPONSES TO PHQ QUESTIONS 1-9: 0
SUM OF ALL RESPONSES TO PHQ QUESTIONS 1-9: 0

## 2025-03-27 ASSESSMENT — ACTIVITIES OF DAILY LIVING (ADL): LACK_OF_TRANSPORTATION: NO

## 2025-03-28 NOTE — PROGRESS NOTES
Chief Complaint   Patient presents with    Rash     Broke out in whelps on Monday right upper arm and shoulder.  Patient states ice is the only thing that calms the itch which is really bad.     \"Have you been to the ER, urgent care clinic since your last visit?  Hospitalized since your last visit?\"    NO    “Have you seen or consulted any other health care providers outside our system since your last visit?”    NO              BP (!) 155/84 (BP Site: Left Upper Arm, Patient Position: Sitting, BP Cuff Size: Medium Adult)   Pulse 83   Temp 97.7 °F (36.5 °C) (Oral)   Resp 20   Ht 1.651 m (5' 5\")   Wt 107.1 kg (236 lb 2 oz)   SpO2 98%   BMI 39.29 kg/m²    
facility-administered medications on file prior to visit.       Orders Placed This Encounter   Medications    triamcinolone (KENALOG) 0.1 % ointment     Sig: Apply topically 2 times daily to affected after.  Do not use for >2 weeks consecutively.     Dispense:  80 g     Refill:  0    valACYclovir (VALTREX) 1 g tablet     Sig: Take 1 tablet by mouth 3 times daily for 7 days     Dispense:  21 tablet     Refill:  0        Allergies   Allergen Reactions    Aspirin Shortness Of Breath     Respiratory distress    Codeine Dizziness or Vertigo       Review of Systems   Constitutional:  Negative for activity change, appetite change, chills, diaphoresis, fatigue and fever.   HENT:  Negative for congestion, dental problem, ear pain, facial swelling, nosebleeds, sinus pain, sneezing, sore throat and trouble swallowing.    Eyes:  Negative for photophobia, pain, discharge, redness and itching.   Respiratory:  Negative for apnea, cough, choking, chest tightness, shortness of breath, wheezing and stridor.    Cardiovascular:  Positive for leg swelling. Negative for chest pain and palpitations.   Gastrointestinal:  Negative for abdominal distention, abdominal pain, blood in stool, constipation, diarrhea, nausea and vomiting.   Endocrine: Negative for polydipsia, polyphagia and polyuria.   Genitourinary:  Negative for difficulty urinating, dysuria, flank pain, frequency, genital sores, hematuria and urgency.   Musculoskeletal:  Positive for arthralgias, gait problem and myalgias.   Skin:  Positive for rash. Negative for color change.   Neurological:  Negative for dizziness, weakness, light-headedness and headaches.   Hematological:  Negative for adenopathy. Does not bruise/bleed easily.   Psychiatric/Behavioral:  Negative for agitation, behavioral problems, confusion, decreased concentration, dysphoric mood and suicidal ideas. The patient is not hyperactive.          Objective    Vitals:    03/27/25 1722   BP: (!) 155/84   Pulse: 83

## 2025-04-07 DIAGNOSIS — L28.2 PRURITIC RASH: Primary | ICD-10-CM

## 2025-04-07 RX ORDER — VALACYCLOVIR HYDROCHLORIDE 1 G/1
1000 TABLET, FILM COATED ORAL 3 TIMES DAILY
Qty: 21 TABLET | Refills: 0 | Status: SHIPPED | OUTPATIENT
Start: 2025-04-07 | End: 2025-04-14

## 2025-04-23 ENCOUNTER — OFFICE VISIT (OUTPATIENT)
Facility: CLINIC | Age: 75
End: 2025-04-23
Payer: MEDICARE

## 2025-04-23 VITALS
TEMPERATURE: 98.1 F | OXYGEN SATURATION: 93 % | HEIGHT: 65 IN | RESPIRATION RATE: 20 BRPM | DIASTOLIC BLOOD PRESSURE: 77 MMHG | SYSTOLIC BLOOD PRESSURE: 132 MMHG | BODY MASS INDEX: 38.99 KG/M2 | HEART RATE: 83 BPM | WEIGHT: 234 LBS

## 2025-04-23 DIAGNOSIS — I10 ESSENTIAL (PRIMARY) HYPERTENSION: ICD-10-CM

## 2025-04-23 DIAGNOSIS — I51.89 DIASTOLIC DYSFUNCTION: ICD-10-CM

## 2025-04-23 DIAGNOSIS — E78.2 MIXED HYPERLIPIDEMIA: ICD-10-CM

## 2025-04-23 DIAGNOSIS — E66.01 MORBID (SEVERE) OBESITY DUE TO EXCESS CALORIES (HCC): ICD-10-CM

## 2025-04-23 DIAGNOSIS — R31.29 MICROSCOPIC HEMATURIA: ICD-10-CM

## 2025-04-23 DIAGNOSIS — E11.9 TYPE 2 DIABETES MELLITUS WITHOUT COMPLICATION, WITHOUT LONG-TERM CURRENT USE OF INSULIN (HCC): Primary | ICD-10-CM

## 2025-04-23 DIAGNOSIS — J45.20 MILD INTERMITTENT ASTHMA WITHOUT COMPLICATION: ICD-10-CM

## 2025-04-23 DIAGNOSIS — M15.0 PRIMARY OSTEOARTHRITIS INVOLVING MULTIPLE JOINTS: ICD-10-CM

## 2025-04-23 DIAGNOSIS — I87.303 CHRONIC VENOUS HYPERTENSION INVOLVING BOTH SIDES: ICD-10-CM

## 2025-04-23 DIAGNOSIS — H40.9 GLAUCOMA, UNSPECIFIED GLAUCOMA TYPE, UNSPECIFIED LATERALITY: ICD-10-CM

## 2025-04-23 DIAGNOSIS — M77.50 TENDINITIS OF FOOT: ICD-10-CM

## 2025-04-23 DIAGNOSIS — M1A.9XX0 CHRONIC GOUT WITHOUT TOPHUS, UNSPECIFIED CAUSE, UNSPECIFIED SITE: ICD-10-CM

## 2025-04-23 LAB — HBA1C MFR BLD: 6.8 %

## 2025-04-23 PROCEDURE — 3044F HG A1C LEVEL LT 7.0%: CPT | Performed by: NURSE PRACTITIONER

## 2025-04-23 PROCEDURE — 83036 HEMOGLOBIN GLYCOSYLATED A1C: CPT | Performed by: NURSE PRACTITIONER

## 2025-04-23 PROCEDURE — 1159F MED LIST DOCD IN RCRD: CPT | Performed by: NURSE PRACTITIONER

## 2025-04-23 PROCEDURE — G8399 PT W/DXA RESULTS DOCUMENT: HCPCS | Performed by: NURSE PRACTITIONER

## 2025-04-23 PROCEDURE — 1090F PRES/ABSN URINE INCON ASSESS: CPT | Performed by: NURSE PRACTITIONER

## 2025-04-23 PROCEDURE — G8427 DOCREV CUR MEDS BY ELIG CLIN: HCPCS | Performed by: NURSE PRACTITIONER

## 2025-04-23 PROCEDURE — 3075F SYST BP GE 130 - 139MM HG: CPT | Performed by: NURSE PRACTITIONER

## 2025-04-23 PROCEDURE — 99214 OFFICE O/P EST MOD 30 MIN: CPT | Performed by: NURSE PRACTITIONER

## 2025-04-23 PROCEDURE — 1036F TOBACCO NON-USER: CPT | Performed by: NURSE PRACTITIONER

## 2025-04-23 PROCEDURE — 1160F RVW MEDS BY RX/DR IN RCRD: CPT | Performed by: NURSE PRACTITIONER

## 2025-04-23 PROCEDURE — 3078F DIAST BP <80 MM HG: CPT | Performed by: NURSE PRACTITIONER

## 2025-04-23 PROCEDURE — 1123F ACP DISCUSS/DSCN MKR DOCD: CPT | Performed by: NURSE PRACTITIONER

## 2025-04-23 PROCEDURE — G8417 CALC BMI ABV UP PARAM F/U: HCPCS | Performed by: NURSE PRACTITIONER

## 2025-04-23 NOTE — PROGRESS NOTES
Chief Complaint   Patient presents with    Diabetes     Follow up     1. Have you been to the ER, urgent care clinic since your last visit?  Hospitalized since your last visit?No    2. Have you seen or consulted any other health care providers outside of the [facility] since your last visit?  Include any pap smears or colon screening. No          
covered and remains on Trulicity 3mg every 7 days.    Continue metformin as directed and is now off lantus and glipizide due to hypoglycemia.   Advised needs may fluctuate depending on frequency of cortisone shots.   Continue to encourage following diabetic diet along with getting regular exercise 3-5 times weekly   Check  glucose twice daily and bring to next appointment. Notify provider if fasting > 250 or < 70.   Continue regular eye exams   Check feet daily and notify provider immediately if wound develops  - Lipid Panel  - Thyroid Cascade Profile  - Comprehensive Metabolic Panel  - Albumin/Creatinine Ratio, Urine  - CBC with Auto Differential  - Urinalysis with Microscopic  - VITAMIN B12 & FOLATE     2. Essential (primary) hypertension   Blood pressure is at goal    Continue  lisinopril 20mg daily, HCTZ 25mg daily and amlodipine 10mg daily   Check readings at home and notify provider if > 140 or < 100 systolic.      3. Mixed hyperlipidemia due to type 2 diabetes mellitus (HCC)     Lab Results   Component Value Date    LDLDIRECT 90 11/23/2020    Essentially stable   Continues on pravastatin 40 mg daily as directed      4. Mild intermittent asthma, uncomplicated   Symptoms stable   On advair as directed.   Also has albuterol as needed      5. Osteoarthritis involving multiple joints on both sides of body   She has pain in multiple joints including shoulders and knees   Told she will knee left knee replacement.   Received cortisone shot in left shoulder 10/2024 and symptoms improved.   We will continue on Tylenol and topical diclofenac as needed   Encourage range of motion/strengthening exercises daily   Work on weight reduction.     6. Severe obesity (HCC)  Last Weight Metrics:      4/23/2025    11:45 AM 3/27/2025     5:22 PM 10/23/2024    10:11 AM 10/21/2024    11:17 AM 10/7/2024    11:21 AM 10/3/2024     2:41 PM 9/22/2024     5:00 PM   Weight Loss Metrics   Height 5' 5\" 5' 5\" 5' 5\" 5' 5.98\" 5' 5.984\" 5' 6\" 5'

## 2025-04-24 LAB
ALBUMIN SERPL-MCNC: 4.5 G/DL (ref 3.8–4.8)
ALBUMIN/CREAT UR: 346 MG/G CREAT (ref 0–29)
ALP SERPL-CCNC: 80 IU/L (ref 44–121)
ALT SERPL-CCNC: 17 IU/L (ref 0–32)
APPEARANCE UR: CLEAR
AST SERPL-CCNC: 15 IU/L (ref 0–40)
BACTERIA #/AREA URNS HPF: ABNORMAL /[HPF]
BASOPHILS # BLD AUTO: 0 X10E3/UL (ref 0–0.2)
BASOPHILS NFR BLD AUTO: 0 %
BILIRUB SERPL-MCNC: 0.3 MG/DL (ref 0–1.2)
BILIRUB UR QL STRIP: NEGATIVE
BUN SERPL-MCNC: 14 MG/DL (ref 8–27)
BUN/CREAT SERPL: 19 (ref 12–28)
CALCIUM SERPL-MCNC: 9.9 MG/DL (ref 8.7–10.3)
CASTS URNS QL MICRO: ABNORMAL /LPF
CHLORIDE SERPL-SCNC: 100 MMOL/L (ref 96–106)
CHOLEST SERPL-MCNC: 222 MG/DL (ref 100–199)
CO2 SERPL-SCNC: 27 MMOL/L (ref 20–29)
COLOR UR: YELLOW
CREAT SERPL-MCNC: 0.74 MG/DL (ref 0.57–1)
CREAT UR-MCNC: 73 MG/DL
EGFRCR SERPLBLD CKD-EPI 2021: 83 ML/MIN/1.73
EOSINOPHIL # BLD AUTO: 0 X10E3/UL (ref 0–0.4)
EOSINOPHIL NFR BLD AUTO: 0 %
EPI CELLS #/AREA URNS HPF: ABNORMAL /HPF (ref 0–10)
ERYTHROCYTE [DISTWIDTH] IN BLOOD BY AUTOMATED COUNT: 13.1 % (ref 11.7–15.4)
FOLATE SERPL-MCNC: 16.1 NG/ML
GLOBULIN SER CALC-MCNC: 3 G/DL (ref 1.5–4.5)
GLUCOSE SERPL-MCNC: 119 MG/DL (ref 70–99)
GLUCOSE UR QL STRIP: NEGATIVE
HCT VFR BLD AUTO: 39.2 % (ref 34–46.6)
HDLC SERPL-MCNC: 53 MG/DL
HGB BLD-MCNC: 12.6 G/DL (ref 11.1–15.9)
HGB UR QL STRIP: NEGATIVE
IMM GRANULOCYTES # BLD AUTO: 0 X10E3/UL (ref 0–0.1)
IMM GRANULOCYTES NFR BLD AUTO: 0 %
KETONES UR QL STRIP: NEGATIVE
LDLC SERPL CALC-MCNC: 143 MG/DL (ref 0–99)
LEUKOCYTE ESTERASE UR QL STRIP: NEGATIVE
LYMPHOCYTES # BLD AUTO: 1.7 X10E3/UL (ref 0.7–3.1)
LYMPHOCYTES NFR BLD AUTO: 32 %
MCH RBC QN AUTO: 29.2 PG (ref 26.6–33)
MCHC RBC AUTO-ENTMCNC: 32.1 G/DL (ref 31.5–35.7)
MCV RBC AUTO: 91 FL (ref 79–97)
MICRO URNS: ABNORMAL
MICROALBUMIN UR-MCNC: 252.7 UG/ML
MONOCYTES # BLD AUTO: 0.5 X10E3/UL (ref 0.1–0.9)
MONOCYTES NFR BLD AUTO: 10 %
NEUTROPHILS # BLD AUTO: 3 X10E3/UL (ref 1.4–7)
NEUTROPHILS NFR BLD AUTO: 58 %
NITRITE UR QL STRIP: NEGATIVE
PH UR STRIP: 7 [PH] (ref 5–7.5)
PLATELET # BLD AUTO: 290 X10E3/UL (ref 150–450)
POTASSIUM SERPL-SCNC: 4.2 MMOL/L (ref 3.5–5.2)
PROT SERPL-MCNC: 7.5 G/DL (ref 6–8.5)
PROT UR QL STRIP: ABNORMAL
RBC # BLD AUTO: 4.32 X10E6/UL (ref 3.77–5.28)
RBC #/AREA URNS HPF: ABNORMAL /HPF (ref 0–2)
SODIUM SERPL-SCNC: 142 MMOL/L (ref 134–144)
SP GR UR STRIP: 1.02 (ref 1–1.03)
TRIGL SERPL-MCNC: 145 MG/DL (ref 0–149)
TSH SERPL DL<=0.005 MIU/L-ACNC: 1.22 UIU/ML (ref 0.45–4.5)
UROBILINOGEN UR STRIP-MCNC: 0.2 MG/DL (ref 0.2–1)
VIT B12 SERPL-MCNC: 637 PG/ML (ref 232–1245)
VLDLC SERPL CALC-MCNC: 26 MG/DL (ref 5–40)
WBC # BLD AUTO: 5.3 X10E3/UL (ref 3.4–10.8)
WBC #/AREA URNS HPF: ABNORMAL /HPF (ref 0–5)

## 2025-04-25 DIAGNOSIS — I10 ESSENTIAL (PRIMARY) HYPERTENSION: ICD-10-CM

## 2025-04-25 RX ORDER — CLONIDINE HYDROCHLORIDE 0.2 MG/1
0.2 TABLET ORAL 3 TIMES DAILY
Qty: 270 TABLET | Refills: 1 | Status: SHIPPED | OUTPATIENT
Start: 2025-04-25

## 2025-04-25 RX ORDER — TRIAMCINOLONE ACETONIDE 1 MG/G
OINTMENT TOPICAL
Qty: 80 G | Refills: 11 | Status: SHIPPED | OUTPATIENT
Start: 2025-04-25

## 2025-04-26 DIAGNOSIS — E11.9 TYPE 2 DIABETES MELLITUS WITHOUT COMPLICATION, WITHOUT LONG-TERM CURRENT USE OF INSULIN (HCC): ICD-10-CM

## 2025-04-27 RX ORDER — INSULIN GLARGINE 100 [IU]/ML
INJECTION, SOLUTION SUBCUTANEOUS
Qty: 30 ML | Refills: 1 | Status: SHIPPED | OUTPATIENT
Start: 2025-04-27

## 2025-05-05 ENCOUNTER — RESULTS FOLLOW-UP (OUTPATIENT)
Facility: CLINIC | Age: 75
End: 2025-05-05

## 2025-05-05 DIAGNOSIS — R80.9 PROTEINURIA, UNSPECIFIED TYPE: Primary | ICD-10-CM

## 2025-06-03 ENCOUNTER — TELEPHONE (OUTPATIENT)
Facility: CLINIC | Age: 75
End: 2025-06-03

## 2025-06-03 NOTE — TELEPHONE ENCOUNTER
Pt called and stated shedid not want to go see dr Muniz anymore. He gave her jardiance and she believes that it made her BP high. And he changed her BP meds, please review notes in media

## 2025-07-14 ENCOUNTER — HOSPITAL ENCOUNTER (OUTPATIENT)
Facility: HOSPITAL | Age: 75
Setting detail: SPECIMEN
Discharge: HOME | End: 2025-07-17
Payer: MEDICARE

## 2025-07-14 DIAGNOSIS — N18.30 STAGE 3 CHRONIC KIDNEY DISEASE, UNSPECIFIED WHETHER STAGE 3A OR 3B CKD (HCC): ICD-10-CM

## 2025-07-14 DIAGNOSIS — E63.1 IMBALANCE OF CONSTITUENTS OF FOOD INTAKE: ICD-10-CM

## 2025-07-14 DIAGNOSIS — I10 ESSENTIAL HYPERTENSION, MALIGNANT: ICD-10-CM

## 2025-07-14 DIAGNOSIS — E55.9 AVITAMINOSIS D: ICD-10-CM

## 2025-07-14 LAB
ALBUMIN SERPL-MCNC: 3.7 G/DL (ref 3.5–5)
ANION GAP SERPL CALC-SCNC: 6 MMOL/L (ref 2–12)
BUN SERPL-MCNC: 12 MG/DL (ref 6–20)
BUN/CREAT SERPL: 12 (ref 12–20)
CA-I BLD-MCNC: 9.2 MG/DL (ref 8.5–10.1)
CHLORIDE SERPL-SCNC: 103 MMOL/L (ref 97–108)
CO2 SERPL-SCNC: 30 MMOL/L (ref 21–32)
CREAT SERPL-MCNC: 1.01 MG/DL (ref 0.55–1.02)
CRP SERPL-MCNC: 1.58 MG/DL (ref 0–0.3)
ERYTHROCYTE [DISTWIDTH] IN BLOOD BY AUTOMATED COUNT: 13.4 % (ref 11.5–14.5)
FERRITIN SERPL-MCNC: 39 NG/ML (ref 8–252)
GLUCOSE SERPL-MCNC: 132 MG/DL (ref 65–100)
HCT VFR BLD AUTO: 39.1 % (ref 35–47)
HGB BLD-MCNC: 12.5 G/DL (ref 11.5–16)
MCH RBC QN AUTO: 30 PG (ref 26–34)
MCHC RBC AUTO-ENTMCNC: 32 G/DL (ref 30–36.5)
MCV RBC AUTO: 94 FL (ref 80–99)
NRBC # BLD: 0 K/UL (ref 0–0.01)
NRBC BLD-RTO: 0 PER 100 WBC
PHOSPHATE SERPL-MCNC: 4.1 MG/DL (ref 2.6–4.7)
PLATELET # BLD AUTO: 249 K/UL (ref 150–400)
PMV BLD AUTO: 9.1 FL (ref 8.9–12.9)
POTASSIUM SERPL-SCNC: 3.6 MMOL/L (ref 3.5–5.1)
RBC # BLD AUTO: 4.16 M/UL (ref 3.8–5.2)
SODIUM SERPL-SCNC: 139 MMOL/L (ref 136–145)
WBC # BLD AUTO: 5.5 K/UL (ref 3.6–11)

## 2025-07-14 PROCEDURE — 83970 ASSAY OF PARATHORMONE: CPT

## 2025-07-14 PROCEDURE — 36415 COLL VENOUS BLD VENIPUNCTURE: CPT

## 2025-07-14 PROCEDURE — 83521 IG LIGHT CHAINS FREE EACH: CPT

## 2025-07-14 PROCEDURE — 86160 COMPLEMENT ANTIGEN: CPT

## 2025-07-14 PROCEDURE — 82306 VITAMIN D 25 HYDROXY: CPT

## 2025-07-14 PROCEDURE — 86140 C-REACTIVE PROTEIN: CPT

## 2025-07-14 PROCEDURE — 86706 HEP B SURFACE ANTIBODY: CPT

## 2025-07-14 PROCEDURE — 86037 ANCA TITER EACH ANTIBODY: CPT

## 2025-07-14 PROCEDURE — 82728 ASSAY OF FERRITIN: CPT

## 2025-07-14 PROCEDURE — 83540 ASSAY OF IRON: CPT

## 2025-07-14 PROCEDURE — 83550 IRON BINDING TEST: CPT

## 2025-07-14 PROCEDURE — 86803 HEPATITIS C AB TEST: CPT

## 2025-07-14 PROCEDURE — 83516 IMMUNOASSAY NONANTIBODY: CPT

## 2025-07-14 PROCEDURE — 84244 ASSAY OF RENIN: CPT

## 2025-07-14 PROCEDURE — 80069 RENAL FUNCTION PANEL: CPT

## 2025-07-14 PROCEDURE — 85027 COMPLETE CBC AUTOMATED: CPT

## 2025-07-15 LAB
25(OH)D3 SERPL-MCNC: 40 NG/ML (ref 30–100)
C-ANCA TITR SER IF: NORMAL TITER
C3 SERPL-MCNC: 191 MG/DL (ref 82–167)
C4 SERPL-MCNC: 42 MG/DL (ref 12–38)
CA-I BLD-MCNC: 9.5 MG/DL (ref 8.5–10.1)
HBV SURFACE AB SER QL: NONREACTIVE
HBV SURFACE AB SER-ACNC: <3.1 MIU/ML
HCV AB SER IA-ACNC: 0.02 INDEX
HCV AB SERPL QL IA: NONREACTIVE
KAPPA LC FREE SER-MCNC: 22.7 MG/L (ref 3.3–19.4)
KAPPA LC FREE/LAMBDA FREE SER: 1.46 (ref 0.26–1.65)
LAMBDA LC FREE SERPL-MCNC: 15.6 MG/L (ref 5.7–26.3)
MYELOPEROXIDASE AB SER IA-ACNC: <0.2 UNITS (ref 0–0.9)
P-ANCA ATYPICAL TITR SER IF: NORMAL TITER
P-ANCA TITR SER IF: NORMAL TITER
PROTEINASE3 AB SER IA-ACNC: <0.2 UNITS (ref 0–0.9)
PTH-INTACT SERPL-MCNC: 23.3 PG/ML (ref 18.4–88)

## 2025-07-16 LAB
IRON SATN MFR SERPL: 24 % (ref 15–55)
IRON: 74 UG/DL (ref 27–139)
TIBC SERPL-MCNC: 314 UG/DL (ref 250–450)
UIBC SERPL-MCNC: 240 UG/DL (ref 118–369)

## 2025-07-16 RX ORDER — HYDROCHLOROTHIAZIDE 25 MG/1
25 TABLET ORAL DAILY
Qty: 90 TABLET | Refills: 0 | Status: SHIPPED | OUTPATIENT
Start: 2025-07-16

## 2025-07-16 RX ORDER — LISINOPRIL 20 MG/1
20 TABLET ORAL DAILY
Qty: 90 TABLET | Refills: 3 | Status: SHIPPED | OUTPATIENT
Start: 2025-07-16

## 2025-07-16 RX ORDER — PRAVASTATIN SODIUM 40 MG
40 TABLET ORAL DAILY
Qty: 90 TABLET | Refills: 3 | Status: SHIPPED | OUTPATIENT
Start: 2025-07-16

## 2025-07-16 RX ORDER — ALLOPURINOL 100 MG/1
100 TABLET ORAL DAILY
Qty: 90 TABLET | Refills: 0 | Status: SHIPPED | OUTPATIENT
Start: 2025-07-16

## 2025-07-17 LAB — RENIN PLAS-CCNC: 0.35 NG/ML/HR (ref 0.17–5.38)

## 2025-07-22 LAB — ALDOST SERPL-MCNC: 4.6 NG/DL (ref 0–30)

## (undated) DEVICE — 1200CC GUARDIAN II: Brand: GUARDIAN

## (undated) DEVICE — FORCEPS BX L240CM JAW DIA2.4MM ORNG L CAP W/ NDL DISP RAD

## (undated) DEVICE — JELLY,LUBE,STERILE,FLIP TOP,TUBE,4-OZ: Brand: MEDLINE

## (undated) DEVICE — LINE SAMPLING ADVANCE ORAL NASAL MICROSTREAM O2 TUBING 6.5'

## (undated) DEVICE — PAD,PREPPING,CUFFED,24X48,7",NONSTERILE: Brand: MEDLINE

## (undated) DEVICE — SPONGE GZ W4XL4IN COT 12 PLY TYP VII WVN C FLD DSGN STERILE

## (undated) DEVICE — SOLUTION IRRIG 1000ML STRL H2O USP PLAS POUR BTL

## (undated) DEVICE — TUBING, SUCTION, 9/32" X 10', STRAIGHT: Brand: MEDLINE

## (undated) DEVICE — GLOVE ORANGE PI 7 1/2   MSG9075